# Patient Record
Sex: FEMALE | Race: WHITE | Employment: FULL TIME | URBAN - METROPOLITAN AREA
[De-identification: names, ages, dates, MRNs, and addresses within clinical notes are randomized per-mention and may not be internally consistent; named-entity substitution may affect disease eponyms.]

---

## 2020-10-31 ENCOUNTER — APPOINTMENT (OUTPATIENT)
Dept: CT IMAGING | Age: 40
End: 2020-10-31
Attending: PHYSICIAN ASSISTANT
Payer: COMMERCIAL

## 2020-10-31 ENCOUNTER — HOSPITAL ENCOUNTER (EMERGENCY)
Age: 40
Discharge: HOME | End: 2020-10-31
Attending: EMERGENCY MEDICINE
Payer: COMMERCIAL

## 2020-10-31 VITALS
SYSTOLIC BLOOD PRESSURE: 109 MMHG | BODY MASS INDEX: 19.78 KG/M2 | TEMPERATURE: 98.2 F | HEART RATE: 84 BPM | OXYGEN SATURATION: 99 % | WEIGHT: 126 LBS | RESPIRATION RATE: 18 BRPM | DIASTOLIC BLOOD PRESSURE: 60 MMHG | HEIGHT: 67 IN

## 2020-10-31 DIAGNOSIS — N30.01 ACUTE CYSTITIS WITH HEMATURIA: ICD-10-CM

## 2020-10-31 DIAGNOSIS — F41.1 ANXIETY STATE: ICD-10-CM

## 2020-10-31 DIAGNOSIS — R10.2 PELVIC PAIN IN FEMALE: Primary | ICD-10-CM

## 2020-10-31 LAB
ALBUMIN SERPL-MCNC: 4 G/DL (ref 3.4–5)
ALBUMIN/GLOB SERPL: 1.1 {RATIO} (ref 0.8–1.7)
ALP SERPL-CCNC: 93 U/L (ref 45–117)
ALT SERPL-CCNC: 17 U/L (ref 13–56)
AMORPH CRY URNS QL MICRO: ABNORMAL
ANION GAP SERPL CALC-SCNC: 8 MMOL/L (ref 3–18)
APPEARANCE UR: ABNORMAL
AST SERPL-CCNC: 9 U/L (ref 10–38)
BACTERIA URNS QL MICRO: ABNORMAL /HPF
BASOPHILS # BLD: 0 K/UL (ref 0–0.1)
BASOPHILS NFR BLD: 0 % (ref 0–2)
BILIRUB SERPL-MCNC: 0.5 MG/DL (ref 0.2–1)
BILIRUB UR QL: NEGATIVE
BUN SERPL-MCNC: 14 MG/DL (ref 7–18)
BUN/CREAT SERPL: 20 (ref 12–20)
CALCIUM SERPL-MCNC: 9.3 MG/DL (ref 8.5–10.1)
CAOX CRY URNS QL MICRO: ABNORMAL
CHLORIDE SERPL-SCNC: 107 MMOL/L (ref 100–111)
CO2 SERPL-SCNC: 26 MMOL/L (ref 21–32)
COLOR UR: YELLOW
CREAT SERPL-MCNC: 0.7 MG/DL (ref 0.6–1.3)
DIFFERENTIAL METHOD BLD: ABNORMAL
EOSINOPHIL # BLD: 0.1 K/UL (ref 0–0.4)
EOSINOPHIL NFR BLD: 1 % (ref 0–5)
EPITH CASTS URNS QL MICRO: ABNORMAL /LPF (ref 0–5)
ERYTHROCYTE [DISTWIDTH] IN BLOOD BY AUTOMATED COUNT: 16.1 % (ref 11.6–14.5)
GLOBULIN SER CALC-MCNC: 3.5 G/DL (ref 2–4)
GLUCOSE SERPL-MCNC: 94 MG/DL (ref 74–99)
GLUCOSE UR STRIP.AUTO-MCNC: NEGATIVE MG/DL
HCG UR QL: NEGATIVE
HCT VFR BLD AUTO: 30.3 % (ref 35–45)
HGB BLD-MCNC: 8.7 G/DL (ref 12–16)
HGB UR QL STRIP: ABNORMAL
KETONES UR QL STRIP.AUTO: NEGATIVE MG/DL
LEUKOCYTE ESTERASE UR QL STRIP.AUTO: ABNORMAL
LYMPHOCYTES # BLD: 2 K/UL (ref 0.9–3.6)
LYMPHOCYTES NFR BLD: 15 % (ref 21–52)
MAGNESIUM SERPL-MCNC: 2.2 MG/DL (ref 1.6–2.6)
MCH RBC QN AUTO: 20 PG (ref 24–34)
MCHC RBC AUTO-ENTMCNC: 28.7 G/DL (ref 31–37)
MCV RBC AUTO: 69.8 FL (ref 74–97)
MONOCYTES # BLD: 0.9 K/UL (ref 0.05–1.2)
MONOCYTES NFR BLD: 7 % (ref 3–10)
MUCOUS THREADS URNS QL MICRO: ABNORMAL /LPF
NEUTS SEG # BLD: 10.1 K/UL (ref 1.8–8)
NEUTS SEG NFR BLD: 77 % (ref 40–73)
NITRITE UR QL STRIP.AUTO: NEGATIVE
PH UR STRIP: 8 [PH] (ref 5–8)
PLATELET # BLD AUTO: 319 K/UL (ref 135–420)
PLATELET COMMENTS,PCOM: ABNORMAL
PMV BLD AUTO: 11.5 FL (ref 9.2–11.8)
POTASSIUM SERPL-SCNC: 3.7 MMOL/L (ref 3.5–5.5)
PROT SERPL-MCNC: 7.5 G/DL (ref 6.4–8.2)
PROT UR STRIP-MCNC: NEGATIVE MG/DL
RBC # BLD AUTO: 4.34 M/UL (ref 4.2–5.3)
RBC #/AREA URNS HPF: ABNORMAL /HPF (ref 0–5)
RBC MORPH BLD: ABNORMAL
SODIUM SERPL-SCNC: 141 MMOL/L (ref 136–145)
SP GR UR REFRACTOMETRY: 1.02 (ref 1–1.03)
UROBILINOGEN UR QL STRIP.AUTO: 1 EU/DL (ref 0.2–1)
WBC # BLD AUTO: 13.1 K/UL (ref 4.6–13.2)
WBC URNS QL MICRO: ABNORMAL /HPF (ref 0–5)

## 2020-10-31 PROCEDURE — 81001 URINALYSIS AUTO W/SCOPE: CPT

## 2020-10-31 PROCEDURE — 96375 TX/PRO/DX INJ NEW DRUG ADDON: CPT

## 2020-10-31 PROCEDURE — 83735 ASSAY OF MAGNESIUM: CPT

## 2020-10-31 PROCEDURE — 80053 COMPREHEN METABOLIC PANEL: CPT

## 2020-10-31 PROCEDURE — 74011000636 HC RX REV CODE- 636: Performed by: EMERGENCY MEDICINE

## 2020-10-31 PROCEDURE — 74011250636 HC RX REV CODE- 250/636: Performed by: PHYSICIAN ASSISTANT

## 2020-10-31 PROCEDURE — 85025 COMPLETE CBC W/AUTO DIFF WBC: CPT

## 2020-10-31 PROCEDURE — 81025 URINE PREGNANCY TEST: CPT

## 2020-10-31 PROCEDURE — 74177 CT ABD & PELVIS W/CONTRAST: CPT

## 2020-10-31 PROCEDURE — 96374 THER/PROPH/DIAG INJ IV PUSH: CPT

## 2020-10-31 PROCEDURE — 74011250637 HC RX REV CODE- 250/637: Performed by: PHYSICIAN ASSISTANT

## 2020-10-31 PROCEDURE — 99285 EMERGENCY DEPT VISIT HI MDM: CPT

## 2020-10-31 PROCEDURE — 96361 HYDRATE IV INFUSION ADD-ON: CPT

## 2020-10-31 RX ORDER — CYCLOBENZAPRINE HCL 10 MG
TABLET ORAL
COMMUNITY

## 2020-10-31 RX ORDER — ONDANSETRON 2 MG/ML
4 INJECTION INTRAMUSCULAR; INTRAVENOUS
Status: COMPLETED | OUTPATIENT
Start: 2020-10-31 | End: 2020-10-31

## 2020-10-31 RX ORDER — KETOROLAC TROMETHAMINE 30 MG/ML
15 INJECTION, SOLUTION INTRAMUSCULAR; INTRAVENOUS
Status: COMPLETED | OUTPATIENT
Start: 2020-10-31 | End: 2020-10-31

## 2020-10-31 RX ORDER — ETODOLAC 500 MG/1
500 TABLET, FILM COATED ORAL 2 TIMES DAILY
Status: ON HOLD | COMMUNITY
End: 2021-01-14

## 2020-10-31 RX ORDER — ONDANSETRON 4 MG/1
4 TABLET, ORALLY DISINTEGRATING ORAL
Qty: 12 TAB | Refills: 0 | Status: SHIPPED | OUTPATIENT
Start: 2020-10-31 | End: 2021-01-11

## 2020-10-31 RX ORDER — OXYCODONE AND ACETAMINOPHEN 5; 325 MG/1; MG/1
1 TABLET ORAL
Status: COMPLETED | OUTPATIENT
Start: 2020-10-31 | End: 2020-10-31

## 2020-10-31 RX ORDER — HYDROCODONE BITARTRATE AND ACETAMINOPHEN 5; 325 MG/1; MG/1
1 TABLET ORAL
Qty: 12 TAB | Refills: 0 | Status: SHIPPED | OUTPATIENT
Start: 2020-10-31 | End: 2020-11-05

## 2020-10-31 RX ORDER — NITROFURANTOIN 25; 75 MG/1; MG/1
100 CAPSULE ORAL 2 TIMES DAILY
Qty: 6 CAP | Refills: 0 | Status: SHIPPED | OUTPATIENT
Start: 2020-10-31 | End: 2020-11-03

## 2020-10-31 RX ADMIN — OXYCODONE HYDROCHLORIDE AND ACETAMINOPHEN 1 TABLET: 5; 325 TABLET ORAL at 12:53

## 2020-10-31 RX ADMIN — IOPAMIDOL 100 ML: 612 INJECTION, SOLUTION INTRAVENOUS at 13:03

## 2020-10-31 RX ADMIN — SODIUM CHLORIDE 1000 ML: 900 INJECTION, SOLUTION INTRAVENOUS at 11:57

## 2020-10-31 RX ADMIN — KETOROLAC TROMETHAMINE 15 MG: 30 INJECTION, SOLUTION INTRAMUSCULAR at 11:57

## 2020-10-31 RX ADMIN — ONDANSETRON 4 MG: 2 INJECTION INTRAMUSCULAR; INTRAVENOUS at 12:53

## 2020-10-31 NOTE — ED NOTES
I have reviewed discharge instructions with the patient. The patient verbalized understanding. Patient ARMBAND removed @ bedside and placed in shredder.  @ bedside for d/c ride home.

## 2020-10-31 NOTE — ED PROVIDER NOTES
EMERGENCY DEPARTMENT HISTORY AND PHYSICAL EXAM    Date: 10/31/2020  Patient Name: [facility]    History of Presenting Illness     Chief Complaint   Patient presents with    Allergic Reaction    Abdominal Pain         History Provided By: Patient    Chief Complaint: abdominal pain, allergic reaction, anxiety    HPI(Context):   11:08 AM  [facility] is a 36 y.o. female with PMHX of endometriosis, anxiety, seizures who presents to the emergency department C/O abdominal pain with possible allergic reaction. Pt reports intermittent pelvic pain for several months. Pain is aching and sharp. Radiates to back. Pt awaiting FU with Dr. Brian Gardner MD (ObGYN) for possible endometriosis. Pt reports last night she slept poorly due to pain. Pt states this AM she argued with her SO and soon after she began to feel swelling to right upper lip with reduced sensation to right side of face. Reports numbness in BLE. Pt is currently on her menses. Pt denies fever, chills, nausea, vomiting, tongue swelling, throat swelling, headache, vision changes, incontinence, urinary retention, and any other sxs or complaints. PCP: None    Current Outpatient Medications   Medication Sig Dispense Refill    cyclobenzaprine (FLEXERIL) 10 mg tablet Take  by mouth three (3) times daily as needed for Muscle Spasm(s).  etodolac (LODINE) 500 mg tablet Take 500 mg by mouth two (2) times a day. Indications: pain      HYDROcodone-acetaminophen (NORCO) 5-325 mg per tablet Take 1 Tab by mouth every four (4) hours as needed for Pain for up to 5 days. Max Daily Amount: 6 Tabs. 12 Tab 0    nitrofurantoin, macrocrystal-monohydrate, (Macrobid) 100 mg capsule Take 1 Cap by mouth two (2) times a day for 3 days. Indications: bacterial urinary tract infection 6 Cap 0    ondansetron (Zofran ODT) 4 mg disintegrating tablet Take 1 Tab by mouth every eight (8) hours as needed for Nausea (or vomiting.).  Allow to dissolve on tongue 12 Tab 0 Past History     Past Medical History:  Past Medical History:   Diagnosis Date    Endometriosis     Seizures ([address]        Past Surgical History:  History reviewed. No pertinent surgical history. Family History:  History reviewed. No pertinent family history. Social History:  Social History     Tobacco Use    Smoking status: Never Smoker    Smokeless tobacco: Never Used   Substance Use Topics    Alcohol use: Not Currently    Drug use: Not Currently       Allergies: Allergies   Allergen Reactions    Codeine Hives         Review of Systems   Review of Systems   Constitutional: Negative for chills and fever. HENT: Positive for facial swelling (right lip). Eyes: Negative for visual disturbance. Respiratory: Negative for shortness of breath. Gastrointestinal: Positive for abdominal pain. Negative for nausea and vomiting. Genitourinary: Negative for difficulty urinating. Musculoskeletal: Positive for back pain. Neurological: Positive for numbness (BLE). Negative for dizziness, weakness and headaches. All other systems reviewed and are negative. Physical Exam     Vitals:    10/31/20 1116 10/31/20 1255 10/31/20 1412 10/31/20 1430   BP: (!) 128/58 116/87 110/66 109/60   Pulse:  (!) 103 84    Resp:  18 18    Temp:       SpO2: 95% 100% 100% 99%   Weight:       Height:         Physical Exam  Vitals signs and nursing note reviewed. Constitutional:       General: She is not in acute distress. Appearance: She is well-developed. She is not diaphoretic. Comments: Very anxious  female in NAD. Alert. HENT:      Head: Normocephalic and atraumatic. No right periorbital erythema or left periorbital erythema. Right Ear: External ear normal. No swelling or tenderness. Tympanic membrane is not perforated, erythematous or bulging. Left Ear: External ear normal. No swelling or tenderness. Tympanic membrane is not perforated, erythematous or bulging.       Nose: Nose normal. No congestion or rhinorrhea. Right Sinus: No maxillary sinus tenderness or frontal sinus tenderness. Left Sinus: No maxillary sinus tenderness or frontal sinus tenderness. Mouth/Throat:      Mouth: No oral lesions or angioedema. Dentition: No dental abscesses. Tongue: No lesions. Tongue does not deviate from midline. Pharynx: Uvula midline. No oropharyngeal exudate, posterior oropharyngeal erythema or uvula swelling. Tonsils: No tonsillar abscesses. Eyes:      General:         Right eye: No discharge. Left eye: No discharge. Extraocular Movements: Extraocular movements intact. Conjunctiva/sclera: Conjunctivae normal.      Pupils: Pupils are equal, round, and reactive to light. Neck:      Musculoskeletal: Normal range of motion. Cardiovascular:      Rate and Rhythm: Normal rate and regular rhythm. Heart sounds: Normal heart sounds. No murmur. No friction rub. No gallop. Pulmonary:      Effort: Pulmonary effort is normal. No tachypnea, accessory muscle usage or respiratory distress. Breath sounds: Normal breath sounds. No decreased breath sounds, wheezing, rhonchi or rales. Abdominal:      Tenderness: There is abdominal tenderness (generalized pelvic tenderness). There is no right CVA tenderness, left CVA tenderness, guarding or rebound. Negative signs include McBurney's sign. Musculoskeletal: Normal range of motion. Skin:     General: Skin is warm and dry. Neurological:      Mental Status: She is alert and oriented to person, place, and time. GCS: GCS eye subscore is 4. GCS verbal subscore is 5. GCS motor subscore is 6. Cranial Nerves: Cranial nerves are intact. Motor: No weakness or pronator drift. Coordination: Coordination is intact. Coordination normal.      Comments: Reduced sensation to right side of face     Psychiatric:         Mood and Affect: Mood is anxious.          Judgment: Judgment normal. Diagnostic Study Results     Labs -     Recent Results (from the past 12 hour(s))   URINALYSIS W/ RFLX MICROSCOPIC    Collection Time: 10/31/20 11:20 AM   Result Value Ref Range    Color YELLOW      Appearance CLOUDY      Specific gravity 1.016 1.005 - 1.030      pH (UA) 8.0 5.0 - 8.0      Protein Negative NEG mg/dL    Glucose Negative NEG mg/dL    Ketone Negative NEG mg/dL    Bilirubin Negative NEG      Blood TRACE (A) NEG      Urobilinogen 1.0 0.2 - 1.0 EU/dL    Nitrites Negative NEG      Leukocyte Esterase SMALL (A) NEG     HCG URINE, QL    Collection Time: 10/31/20 11:20 AM   Result Value Ref Range    HCG urine, QL Negative NEG     URINE MICROSCOPIC ONLY    Collection Time: 10/31/20 11:20 AM   Result Value Ref Range    WBC 6 to 8 0 - 5 /hpf    RBC 35 to 45 0 - 5 /hpf    Epithelial cells 3+ 0 - 5 /lpf    Bacteria 2+ (A) NEG /hpf    Mucus 2+ (A) NEG /lpf    Amorphous Crystals 4+ (A) NEG    CA Oxalate crystals FEW (A) NEG     CBC WITH AUTOMATED DIFF    Collection Time: 10/31/20 11:50 AM   Result Value Ref Range    WBC 13.1 4.6 - 13.2 K/uL    RBC 4.34 4.20 - 5.30 M/uL    HGB 8.7 (L) 12.0 - 16.0 g/dL    HCT 30.3 (L) 35.0 - 45.0 %    MCV 69.8 (L) 74.0 - 97.0 FL    MCH 20.0 (L) 24.0 - 34.0 PG    MCHC 28.7 (L) 31.0 - 37.0 g/dL    RDW 16.1 (H) 11.6 - 14.5 %    PLATELET 804 299 - 237 K/uL    MPV 11.5 9.2 - 11.8 FL    NEUTROPHILS 77 (H) 40 - 73 %    LYMPHOCYTES 15 (L) 21 - 52 %    MONOCYTES 7 3 - 10 %    EOSINOPHILS 1 0 - 5 %    BASOPHILS 0 0 - 2 %    ABS. NEUTROPHILS 10.1 (H) 1.8 - 8.0 K/UL    ABS. LYMPHOCYTES 2.0 0.9 - 3.6 K/UL    ABS. MONOCYTES 0.9 0.05 - 1.2 K/UL    ABS. EOSINOPHILS 0.1 0.0 - 0.4 K/UL    ABS.  BASOPHILS 0.0 0.0 - 0.1 K/UL    PLATELET COMMENTS ADEQUATE PLATELETS      RBC COMMENTS ANISOCYTOSIS  1+        RBC COMMENTS HYPOCHROMIA  1+        RBC COMMENTS MICROCYTOSIS  1+        DF AUTOMATED     METABOLIC PANEL, COMPREHENSIVE    Collection Time: 10/31/20 11:50 AM   Result Value Ref Range Sodium 141 136 - 145 mmol/L    Potassium 3.7 3.5 - 5.5 mmol/L    Chloride 107 100 - 111 mmol/L    CO2 26 21 - 32 mmol/L    Anion gap 8 3.0 - 18 mmol/L    Glucose 94 74 - 99 mg/dL    BUN 14 7.0 - 18 MG/DL    Creatinine 0.70 0.6 - 1.3 MG/DL    BUN/Creatinine ratio 20 12 - 20      GFR est AA >60 >60 ml/min/1.73m2    GFR est non-AA >60 >60 ml/min/1.73m2    Calcium 9.3 8.5 - 10.1 MG/DL    Bilirubin, total 0.5 0.2 - 1.0 MG/DL    ALT (SGPT) 17 13 - 56 U/L    AST (SGOT) 9 (L) 10 - 38 U/L    Alk. phosphatase 93 45 - 117 U/L    Protein, total 7.5 6.4 - 8.2 g/dL    Albumin 4.0 3.4 - 5.0 g/dL    Globulin 3.5 2.0 - 4.0 g/dL    A-G Ratio 1.1 0.8 - 1.7     MAGNESIUM    Collection Time: 10/31/20 11:50 AM   Result Value Ref Range    Magnesium 2.2 1.6 - 2.6 mg/dL         CT ABD PELV W CONT   Final Result   Addendum 1 of 1   Addendum: There is a typographical error in the impression. It should read   differential diagnosis includes hydrocolpos. Final        CT Results  (Last 48 hours)    None        CXR Results  (Last 48 hours)    None          Medications given in the ED-  Medications   sodium chloride 0.9 % bolus infusion 1,000 mL (0 mL IntraVENous IV Completed 10/31/20 1405)   ketorolac (TORADOL) injection 15 mg (15 mg IntraVENous Given 10/31/20 1157)   oxyCODONE-acetaminophen (PERCOCET) 5-325 mg per tablet 1 Tab (1 Tab Oral Given 10/31/20 1253)   ondansetron (ZOFRAN) injection 4 mg (4 mg IntraVENous Given 10/31/20 1253)   iopamidoL (ISOVUE 300) 61 % contrast injection 100 mL (100 mL IntraVENous Given 10/31/20 1303)         Medical Decision Making   I am the first provider for this patient. I reviewed the vital signs, available nursing notes, past medical history, past surgical history, family history and social history. Vital Signs-Reviewed the patient's vital signs. Pulse Oximetry Analysis - 99% on RA.  NORMAL     Records Reviewed: Nursing Notes    Provider Notes (Medical Decision Making): pregnancy, UTI, endometriosis, dysmenorrhea, ovarian cyst, appy, allergic reaction, bells. Doubt CVA/TIA. Procedures:  Procedures    ED Course:   11:08 AM Initial assessment performed. The patients presenting problems have been discussed, and they are in agreement with the care plan formulated and outlined with them. I have encouraged them to ask questions as they arise throughout their visit. Diagnosis and Disposition       Pt with multiple complaints including pelvic pain x several weeks with possible allergic reaction this AM. Very anxious and trembling during HPI. CT abdomen/pelvis reveals ovarian cyst with close FU with Dr. Norma Ellis MD (ObGYN) as scheduled for endometriosis workup. Doubt torsion    No evidence of angioedema, phong's, or anaphylactic reaction. Feels better with tx in ED. Doubt CVA or need for advanced intracranial imaging. Reasons to RTED discussed with pt. All questions answered. Pt feels comfortable going home at this time. Pt expressed understanding and she agrees with plan. Discussed case with attending    1. Pelvic pain in female    2. Acute cystitis with hematuria    3. Anxiety state        PLAN:  1. D/C Home  2. Discharge Medication List as of 10/31/2020  2:23 PM      START taking these medications    Details   HYDROcodone-acetaminophen (NORCO) 5-325 mg per tablet Take 1 Tab by mouth every four (4) hours as needed for Pain for up to 5 days. Max Daily Amount: 6 Tabs., Normal, Disp-12 Tab,R-0      nitrofurantoin, macrocrystal-monohydrate, (Macrobid) 100 mg capsule Take 1 Cap by mouth two (2) times a day for 3 days. Indications: bacterial urinary tract infection, Normal, Disp-6 Cap,R-0      ondansetron (Zofran ODT) 4 mg disintegrating tablet Take 1 Tab by mouth every eight (8) hours as needed for Nausea (or vomiting.).  Allow to dissolve on tongue, Normal, Disp-12 Tab,R-0         CONTINUE these medications which have NOT CHANGED    Details   cyclobenzaprine (FLEXERIL) 10 mg tablet Take  by mouth three (3) times daily as needed for Muscle Spasm(s). , Historical Med      etodolac (LODINE) 500 mg tablet Take 500 mg by mouth two (2) times a day. Indications: pain, Historical Med           3. Follow-up Information     Follow up With Specialties Details Why Contact Info    Aditya Burroughs MD Obstetrics & Gynecology, Gynecology, Obstetrics   [address]      [facility] EMERGENCY DEPT Emergency Medicine  As needed, If symptoms worsen [address] [place] [address]  [phone]        _______________________________    Attestations: This note is prepared by Shira Maria PA-C.  _______________________________          Please note that this dictation was completed with Transpond, the computer voice recognition software. Quite often unanticipated grammatical, syntax, homophones, and other interpretive errors are inadvertently transcribed by the computer software. Please disregard these errors. Please excuse any errors that have escaped final proofreading.

## 2020-10-31 NOTE — LETTER
[facility] [place]  [facility] EM[facility]  [address] [facility] [address] [phone]    Work/School Note    Date: 10/31/2020    To Whom It May concern:    Hollie Eason was seen and treated today in the emergency room by the following provider(s):  Attending Provider: Caro House DO  Physician Assistant: Keshav Ellis PA-C. Hollie Eason may return to work on 11/2/20.     Sincerely,          Wojciech Gomez RN

## 2020-10-31 NOTE — ED TRIAGE NOTES
Pt states \" I am having and allergic reaction to something and I am having some right sided lip swelling but I don't know I think I am having some anxiety, I have a lot going on at work and my  and I had some arguing this am.\"

## 2020-11-12 NOTE — PROGRESS NOTES
[address]  [address] [place] [address], [address], [address] [facility]  [address] [facility], [address] [facility]  Eben Cedillo    [phone]  Marsa Coil DO      Patient ID:  Name:  Rc Miranda  MRN:  [id number]  :  1980/40 y.o. Date:  2020      HISTORY OF PRESENT ILLNESS:  Rc Miranda is a 36 y.o.   premenopausal female referred by Dr. Micheal Baires for uterine mass. Patient had MRI for lower back pain 10/17/20 which showed endometrial canal expanded and heterogenous signal intensity\". She was seen in ED 10/31/20 with multiple complaints, including abdominal and back pain. CT abd/pelvis revealed cervical mass and left adnexal mass with septations, report below. Hgb 8.7. She was seen by gyn 11/10/20, TVUS showed irregular mass in endometrium, irregular shaped cervix, report below. She presents today for further management. Has normal menses. No history of abnormal paps or procedures on cervix. Still having some back pain      Pathology:  none    Labs:  10/20/20  Pap: insufficient      Imaging:  10/31/20  CT abd/pelvis  FINDINGS:     LOWER CHEST: Moderate size hiatal hernia.     LIVER, BILIARY: Liver is normal. No biliary dilation. Gallbladder is  unremarkable.     PANCREAS: Normal.     SPLEEN: Normal.     ADRENALS: Normal.     KIDNEYS: Normal.     LYMPH NODES: No enlarged lymph nodes.     GASTROINTESTINAL TRACT: No bowel dilation or wall thickening. Normal appendix.     PELVIC ORGANS: Endometrium is distended with heterogeneous attenuation. Some is  low attenuation however there are areas of high attenuation within this. This  extends down to the cervical region which is prominent and also heterogeneous. There is a complex cystic mass in the left adnexal region which is probably  ovarian. There appear to be several cysts within septations measuring 4.5 x 3.5  cm.  Right ovary is normal. There is some stranding of the fat posterior to the  cervix. Small amount of fluid adjacent to the right ovary.     VASCULATURE: Unremarkable.     BONES: No acute or aggressive osseous abnormalities identified.     OTHER: None.     _______________     IMPRESSION  IMPRESSION:     Uterus is abnormal with dilated endometrial canal measuring up to 7.8 cm  transversely. Heterogeneous material within this. Cervix is also prominent. Differential diagnosis includes hydrocolpos process. This could be secondary to  cervical mass. 2. Enlarged left ovary with multiple cysts. 11/10/20  TVUS  Uterus: 10.21cm  Endometrium: 4.1x4.35cm, ?fibroid vs polyp, hyperechoic irregular shaped mass  Cervix: irregular shape, multiple nabothian cysts noted  Right Ovary: 3.17x2.36cm  Left Ovary: 3.79x2.73cm, cyst 2.35x3. 11cm       ROS:   As above      There are no active problems to display for this patient. Past Medical History:   Diagnosis Date    Endometriosis     Seizures ([facility] [address]       History reviewed. No pertinent surgical history. OB History        1    Para   1    Term                AB        Living   1       SAB        TAB        Ectopic        Molar        Multiple        Live Births                  Social History     Tobacco Use    Smoking status: Never Smoker    Smokeless tobacco: Never Used   Substance Use Topics    Alcohol use: Not Currently      Family History   Problem Relation Age of Onset    Cancer Mother         ovarian      Current Outpatient Medications   Medication Sig    etodolac (LODINE) 500 mg tablet Take 500 mg by mouth two (2) times a day. Indications: pain    cyclobenzaprine (FLEXERIL) 10 mg tablet Take  by mouth three (3) times daily as needed for Muscle Spasm(s).  ondansetron (Zofran ODT) 4 mg disintegrating tablet Take 1 Tab by mouth every eight (8) hours as needed for Nausea (or vomiting.). Allow to dissolve on tongue     No current facility-administered medications for this visit.       Allergies Allergen Reactions    Codeine Hives          OBJECTIVE:    Physical Exam  VITAL SIGNS: Visit Vitals  BP (!) 122/59 (BP 1 Location: Left arm, BP Patient Position: Sitting)   Pulse 91   Temp 97 °F (36.1 °C) (Oral)   Ht 5' 7\" (1.702 m)   Wt 57.5 kg (126 lb 12.8 oz)   LMP 11/01/2020   SpO2 100%   BMI 19.86 kg/m²      GENERAL CLARK: in no apparent distress and well developed and well nourished   MUSCULOSKEL: no joint tenderness, deformity or swelling   INTEGUMENT:  warm and dry, no rashes or lesions   ABDOMEN . soft, NT, ND, No masses appreciated   EXTREMITIES: extremities normal, atraumatic, no cyanosis or edema   PELVIC: External genitalia: normal general appearance  Vaginal: normal mucosa without prolapse or lesions  Cervix: bulky cervix with some tenderness. Adnexa: normal bimanual exam  Uterus: irregular enlargement   RECTAL: deferred   RENAE SURVEY: Cervical, supraclavicular, axillary and inguinal nodes normal.   NEURO: Grossly normal         IMPRESSION/PLAN:  1. Thickened endometrium, enlarged cervix   -reviewed her imaging   -reviewed possible etiology including fibroid, polyp   -plan for hysteroscopy, D&C for tissue diagnosis   -surgery to be scheduled at [facility]    2.  Left ovarian cyst   -would recommend monitor for now pending pathology from [facility]     The total time spent was 60 minutes regarding this patients diagnosis of thickened endometrium, left ovarian cyst and >50% of this time was spent counseling and coordinating care    [address] [place] [address]  Gynecologic Oncology  11/17/20203:52 PM

## 2020-11-12 NOTE — H&P (VIEW-ONLY)
[address], [address] [facility], [address] [facility] 
[address] [place] [address], [address] [place], [address] 
 [phone] Jose R Scherer DO Patient ID: 
Name:  [facility] MRN:  [id number] :  1980/40 y.o. Date:  2020 HISTORY OF PRESENT ILLNESS: 
[facility] is a 36 y.o.   premenopausal female referred by Dr. Clair Whalen for uterine mass. Patient had MRI for lower back pain 10/17/20 which showed endometrial canal expanded and heterogenous signal intensity\". She was seen in ED 10/31/20 with multiple complaints, including abdominal and back pain. CT abd/pelvis revealed cervical mass and left adnexal mass with septations, report below. Hgb 8.7. She was seen by gyn 11/10/20, TVUS showed irregular mass in endometrium, irregular shaped cervix, report below. She presents today for further management. Has normal menses. No history of abnormal paps or procedures on cervix. Still having some back pain Pathology: 
none Labs: 
10/20/20 Pap: insufficient Imaging: 
10/31/20 CT abd/pelvis FINDINGS: 
  
LOWER CHEST: Moderate size hiatal hernia. 
  
LIVER, BILIARY: Liver is normal. No biliary dilation. Gallbladder is 
unremarkable. 
  
PANCREAS: Normal. 
  
SPLEEN: Normal. 
  
ADRENALS: Normal. 
  
KIDNEYS: Normal. 
  
LYMPH NODES: No enlarged lymph nodes. 
  
GASTROINTESTINAL TRACT: No bowel dilation or wall thickening. Normal appendix. 
  
PELVIC ORGANS: Endometrium is distended with heterogeneous attenuation. Some is 
low attenuation however there are areas of high attenuation within this. This 
extends down to the cervical region which is prominent and also heterogeneous. There is a complex cystic mass in the left adnexal region which is probably 
ovarian. There appear to be several cysts within septations measuring 4.5 x 3.5 cm. Right ovary is normal. There is some stranding of the fat posterior to the 
cervix. Small amount of fluid adjacent to the right ovary. 
  
VASCULATURE: Unremarkable. 
  
BONES: No acute or aggressive osseous abnormalities identified. 
  
OTHER: None. 
  
_______________ 
  
IMPRESSION IMPRESSION: 
  
Uterus is abnormal with dilated endometrial canal measuring up to 7.8 cm 
transversely. Heterogeneous material within this. Cervix is also prominent. Differential diagnosis includes hydrocolpos process. This could be secondary to 
cervical mass. 2. Enlarged left ovary with multiple cysts. 11/10/20 TVUS Uterus: 10.21cm Endometrium: 4.1x4.35cm, ?fibroid vs polyp, hyperechoic irregular shaped mass Cervix: irregular shape, multiple nabothian cysts noted Right Ovary: 3.17x2.36cm Left Ovary: 3.79x2.73cm, cyst 2.35x3. 11cm ROS:  
As above There are no active problems to display for this patient. Past Medical History:  
Diagnosis Date  Endometriosis  Seizures ([facility] [address] History reviewed. No pertinent surgical history. OB History [address] 1 Para 1 Term  AB Living  
1 SAB  
   
 TAB Ectopic Molar Multiple Live Births Social History Tobacco Use  Smoking status: Never Smoker  Smokeless tobacco: Never Used Substance Use Topics  Alcohol use: Not Currently Family History Problem Relation Age of Onset  Cancer Mother   
     ovarian Current Outpatient Medications Medication Sig  etodolac (LODINE) 500 mg tablet Take 500 mg by mouth two (2) times a day. Indications: pain  cyclobenzaprine (FLEXERIL) 10 mg tablet Take  by mouth three (3) times daily as needed for Muscle Spasm(s).  ondansetron (Zofran ODT) 4 mg disintegrating tablet Take 1 Tab by mouth every eight (8) hours as needed for Nausea (or vomiting.). Allow to dissolve on tongue No current facility-administered medications for this visit. Allergies Allergen Reactions  Codeine Hives OBJECTIVE: 
 
Physical Exam 
VITAL SIGNS: Visit Vitals BP (!) 122/59 (BP 1 Location: Left arm, BP Patient Position: Sitting) Pulse 91 Temp 97 °F (36.1 °C) (Oral) Ht 5' 7\" (1.702 m) Wt 57.5 kg (126 lb 12.8 oz) LMP 11/01/2020 SpO2 100% BMI 19.86 kg/m² GENERAL CLARK: in no apparent distress and well developed and well nourished MUSCULOSKEL: no joint tenderness, deformity or swelling INTEGUMENT:  warm and dry, no rashes or lesions ABDOMEN . soft, NT, ND, No masses appreciated EXTREMITIES: extremities normal, atraumatic, no cyanosis or edema PELVIC: External genitalia: normal general appearance Vaginal: normal mucosa without prolapse or lesions Cervix: bulky cervix with some tenderness. Adnexa: normal bimanual exam 
Uterus: irregular enlargement RECTAL: deferred RENAE SURVEY: Cervical, supraclavicular, axillary and inguinal nodes normal.  
NEURO: Grossly normal  
 
 
 
IMPRESSION/PLAN: 
1. Thickened endometrium, enlarged cervix 
 -reviewed her imaging 
 -reviewed possible etiology including fibroid, polyp 
 -plan for hysteroscopy, D&C for tissue diagnosis 
 -surgery to be scheduled at [facility] 2. Left ovarian cyst 
 -would recommend monitor for now pending pathology from [facility]  The total time spent was 60 minutes regarding this patients diagnosis of thickened endometrium, left ovarian cyst and >50% of this time was spent counseling and coordinating care Radha Hernandez DO Gynecologic Oncology 11/17/20203:52 PM

## 2020-11-17 ENCOUNTER — OFFICE VISIT (OUTPATIENT)
Dept: ONCOLOGY | Age: 40
End: 2020-11-17
Payer: COMMERCIAL

## 2020-11-17 ENCOUNTER — HOSPITAL ENCOUNTER (OUTPATIENT)
Dept: PREADMISSION TESTING | Age: 40
Discharge: HOME | End: 2020-11-17
Payer: COMMERCIAL

## 2020-11-17 VITALS
BODY MASS INDEX: 19.9 KG/M2 | HEART RATE: 91 BPM | SYSTOLIC BLOOD PRESSURE: 122 MMHG | OXYGEN SATURATION: 100 % | TEMPERATURE: 97 F | WEIGHT: 126.8 LBS | DIASTOLIC BLOOD PRESSURE: 59 MMHG | HEIGHT: 67 IN

## 2020-11-17 DIAGNOSIS — R93.89 THICKENED ENDOMETRIUM: ICD-10-CM

## 2020-11-17 DIAGNOSIS — Z01.818 PREOP TESTING: ICD-10-CM

## 2020-11-17 DIAGNOSIS — Z01.818 PREOP TESTING: Primary | ICD-10-CM

## 2020-11-17 LAB
ALBUMIN SERPL-MCNC: 4 G/DL (ref 3.4–5)
ALBUMIN/GLOB SERPL: 1.2 {RATIO} (ref 0.8–1.7)
ALP SERPL-CCNC: 97 U/L (ref 45–117)
ALT SERPL-CCNC: 15 U/L (ref 13–56)
ANION GAP SERPL CALC-SCNC: 6 MMOL/L (ref 3–18)
AST SERPL-CCNC: 12 U/L (ref 10–38)
BASOPHILS # BLD: 0 K/UL (ref 0–0.1)
BASOPHILS NFR BLD: 0 % (ref 0–2)
BILIRUB SERPL-MCNC: 0.5 MG/DL (ref 0.2–1)
BUN SERPL-MCNC: 14 MG/DL (ref 7–18)
BUN/CREAT SERPL: 22 (ref 12–20)
CALCIUM SERPL-MCNC: 9.4 MG/DL (ref 8.5–10.1)
CHLORIDE SERPL-SCNC: 105 MMOL/L (ref 100–111)
CO2 SERPL-SCNC: 30 MMOL/L (ref 21–32)
CREAT SERPL-MCNC: 0.63 MG/DL (ref 0.6–1.3)
DIFFERENTIAL METHOD BLD: ABNORMAL
EOSINOPHIL # BLD: 0.1 K/UL (ref 0–0.4)
EOSINOPHIL NFR BLD: 1 % (ref 0–5)
ERYTHROCYTE [DISTWIDTH] IN BLOOD BY AUTOMATED COUNT: 16.3 % (ref 11.6–14.5)
GLOBULIN SER CALC-MCNC: 3.4 G/DL (ref 2–4)
GLUCOSE SERPL-MCNC: 88 MG/DL (ref 74–99)
HCG SERPL-ACNC: <1 MIU/ML (ref 0–10)
HCT VFR BLD AUTO: 31.4 % (ref 35–45)
HGB BLD-MCNC: 8.9 G/DL (ref 12–16)
LYMPHOCYTES # BLD: 1.7 K/UL (ref 0.9–3.6)
LYMPHOCYTES NFR BLD: 17 % (ref 21–52)
MCH RBC QN AUTO: 20 PG (ref 24–34)
MCHC RBC AUTO-ENTMCNC: 28.3 G/DL (ref 31–37)
MCV RBC AUTO: 70.7 FL (ref 74–97)
MONOCYTES # BLD: 0.8 K/UL (ref 0.05–1.2)
MONOCYTES NFR BLD: 8 % (ref 3–10)
NEUTS SEG # BLD: 7.2 K/UL (ref 1.8–8)
NEUTS SEG NFR BLD: 74 % (ref 40–73)
PLATELET # BLD AUTO: 311 K/UL (ref 135–420)
PLATELET COMMENTS,PCOM: ABNORMAL
PMV BLD AUTO: 12 FL (ref 9.2–11.8)
POTASSIUM SERPL-SCNC: 3.8 MMOL/L (ref 3.5–5.5)
PROT SERPL-MCNC: 7.4 G/DL (ref 6.4–8.2)
RBC # BLD AUTO: 4.44 M/UL (ref 4.2–5.3)
RBC MORPH BLD: ABNORMAL
SODIUM SERPL-SCNC: 141 MMOL/L (ref 136–145)
WBC # BLD AUTO: 9.8 K/UL (ref 4.6–13.2)

## 2020-11-17 PROCEDURE — 84702 CHORIONIC GONADOTROPIN TEST: CPT

## 2020-11-17 PROCEDURE — 85025 COMPLETE CBC W/AUTO DIFF WBC: CPT

## 2020-11-17 PROCEDURE — 80053 COMPREHEN METABOLIC PANEL: CPT

## 2020-11-17 PROCEDURE — 99205 OFFICE O/P NEW HI 60 MIN: CPT | Performed by: OBSTETRICS & GYNECOLOGY

## 2020-11-17 PROCEDURE — 93005 ELECTROCARDIOGRAM TRACING: CPT

## 2020-11-17 PROCEDURE — 36415 COLL VENOUS BLD VENIPUNCTURE: CPT

## 2020-11-17 NOTE — PROGRESS NOTES
Rc Miranda is a 36 y.o. female presents in office for new pt exam, referred by Dr. Anat Roberto     Chief Complaint   Patient presents with    New Patient      Pt is currently not having any bleeding. Do you have any unusual vaginal bleeding, discharge or irritation? No  Do you have any changes in your bowel movements? No  Have you been experiencing nausea or vomiting? No  Have you been experiencing any continuous or worsening abdominal pain? Pt c/o pain occasionally  Any urinary burning? No    Visit Vitals  BP (!) 122/59 (BP 1 Location: Left arm, BP Patient Position: Sitting)   Pulse 91   Temp 97 °F (36.1 °C) (Oral)   Ht 5' 7\" (1.702 m)   Wt 126 lb 12.8 oz (57.5 kg)   SpO2 100%   BMI 19.86 kg/m²         1. Have you been to the ER, urgent care clinic since your last visit? Hospitalized since your last visit? Yes 11/03/2020 allergic reaction    2. Have you seen or consulted any other health care providers outside of the [address] [place] [address] since your last visit? Include any pap smears or colon screening.  No

## 2020-11-18 LAB
ATRIAL RATE: 69 BPM
CALCULATED P AXIS, ECG09: 69 DEGREES
CALCULATED R AXIS, ECG10: 76 DEGREES
CALCULATED T AXIS, ECG11: 61 DEGREES
DIAGNOSIS, 93000: NORMAL
P-R INTERVAL, ECG05: 110 MS
Q-T INTERVAL, ECG07: 388 MS
QRS DURATION, ECG06: 86 MS
QTC CALCULATION (BEZET), ECG08: 415 MS
VENTRICULAR RATE, ECG03: 69 BPM

## 2020-11-23 ENCOUNTER — TELEPHONE (OUTPATIENT)
Dept: ONCOLOGY | Age: 40
End: 2020-11-23

## 2020-11-23 NOTE — TELEPHONE ENCOUNTER
Left a message advising patient to contact the office regarding surgery date. Office number was left on voicemail.

## 2020-12-11 ENCOUNTER — HOSPITAL ENCOUNTER (OUTPATIENT)
Dept: PREADMISSION TESTING | Age: 40
Discharge: HOME | End: 2020-12-11
Payer: COMMERCIAL

## 2020-12-11 PROCEDURE — 87635 SARS-COV-2 COVID-19 AMP PRB: CPT

## 2020-12-13 LAB — SARS-COV-2, COV2NT: NOT DETECTED

## 2020-12-14 ENCOUNTER — ANESTHESIA EVENT (OUTPATIENT)
Dept: SURGERY | Age: 40
End: 2020-12-14
Payer: COMMERCIAL

## 2020-12-16 ENCOUNTER — TELEPHONE (OUTPATIENT)
Dept: ONCOLOGY | Age: 40
End: 2020-12-16

## 2020-12-16 NOTE — TELEPHONE ENCOUNTER
Patient confirmed arrival and surgery start time for 12/17/2020. Patient is to arrive at 11:30 AM with a 1:30 PM surgery start time.

## 2020-12-17 ENCOUNTER — HOSPITAL ENCOUNTER (OUTPATIENT)
Age: 40
Setting detail: OUTPATIENT SURGERY
Discharge: HOME | End: 2020-12-17
Attending: OBSTETRICS & GYNECOLOGY | Admitting: OBSTETRICS & GYNECOLOGY
Payer: COMMERCIAL

## 2020-12-17 ENCOUNTER — ANESTHESIA (OUTPATIENT)
Dept: SURGERY | Age: 40
End: 2020-12-17
Payer: COMMERCIAL

## 2020-12-17 VITALS
TEMPERATURE: 97.6 F | RESPIRATION RATE: 18 BRPM | SYSTOLIC BLOOD PRESSURE: 117 MMHG | BODY MASS INDEX: 19.16 KG/M2 | OXYGEN SATURATION: 100 % | HEIGHT: 67 IN | HEART RATE: 93 BPM | DIASTOLIC BLOOD PRESSURE: 66 MMHG | WEIGHT: 122.06 LBS

## 2020-12-17 DIAGNOSIS — R93.89 THICKENED ENDOMETRIUM: ICD-10-CM

## 2020-12-17 DIAGNOSIS — N88.8 CERVICAL MASS: ICD-10-CM

## 2020-12-17 DIAGNOSIS — G89.18 POST-OP PAIN: Primary | ICD-10-CM

## 2020-12-17 LAB — HCG UR QL: NEGATIVE

## 2020-12-17 PROCEDURE — 76210000026 HC REC RM PH II 1 TO 1.5 HR: Performed by: OBSTETRICS & GYNECOLOGY

## 2020-12-17 PROCEDURE — 57500 BIOPSY OF CERVIX: CPT | Performed by: OBSTETRICS & GYNECOLOGY

## 2020-12-17 PROCEDURE — 74011250636 HC RX REV CODE- 250/636: Performed by: NURSE ANESTHETIST, CERTIFIED REGISTERED

## 2020-12-17 PROCEDURE — 81025 URINE PREGNANCY TEST: CPT

## 2020-12-17 PROCEDURE — 77030018390 HC SPNG HEMSTAT2 J&J -B: Performed by: OBSTETRICS & GYNECOLOGY

## 2020-12-17 PROCEDURE — 76010000138 HC OR TIME 0.5 TO 1 HR: Performed by: OBSTETRICS & GYNECOLOGY

## 2020-12-17 PROCEDURE — 2709999900 HC NON-CHARGEABLE SUPPLY: Performed by: OBSTETRICS & GYNECOLOGY

## 2020-12-17 PROCEDURE — 88342 IMHCHEM/IMCYTCHM 1ST ANTB: CPT

## 2020-12-17 PROCEDURE — 88305 TISSUE EXAM BY PATHOLOGIST: CPT

## 2020-12-17 PROCEDURE — 77030020782 HC GWN BAIR PAWS FLX 3M -B: Performed by: OBSTETRICS & GYNECOLOGY

## 2020-12-17 PROCEDURE — 77030011264 HC ELECTRD BLD EXT COVD -A: Performed by: OBSTETRICS & GYNECOLOGY

## 2020-12-17 PROCEDURE — 74011000250 HC RX REV CODE- 250: Performed by: NURSE ANESTHETIST, CERTIFIED REGISTERED

## 2020-12-17 PROCEDURE — 74011250636 HC RX REV CODE- 250/636: Performed by: OBSTETRICS & GYNECOLOGY

## 2020-12-17 PROCEDURE — 76210000006 HC OR PH I REC 0.5 TO 1 HR: Performed by: OBSTETRICS & GYNECOLOGY

## 2020-12-17 PROCEDURE — 74011250636 HC RX REV CODE- 250/636: Performed by: ANESTHESIOLOGY

## 2020-12-17 PROCEDURE — 77030019927 HC TBNG IRR CYSTO BAXT -A: Performed by: OBSTETRICS & GYNECOLOGY

## 2020-12-17 PROCEDURE — 88341 IMHCHEM/IMCYTCHM EA ADD ANTB: CPT

## 2020-12-17 PROCEDURE — 58558 HYSTEROSCOPY BIOPSY: CPT | Performed by: OBSTETRICS & GYNECOLOGY

## 2020-12-17 PROCEDURE — 76060000032 HC ANESTHESIA 0.5 TO 1 HR: Performed by: OBSTETRICS & GYNECOLOGY

## 2020-12-17 PROCEDURE — 77030040361 HC SLV COMPR DVT MDII -B: Performed by: OBSTETRICS & GYNECOLOGY

## 2020-12-17 RX ORDER — OXYCODONE AND ACETAMINOPHEN 5; 325 MG/1; MG/1
1 TABLET ORAL
Qty: 40 TAB | Refills: 0 | Status: SHIPPED | OUTPATIENT
Start: 2020-12-17 | End: 2020-12-31

## 2020-12-17 RX ORDER — NALOXONE HYDROCHLORIDE 0.4 MG/ML
0.1 INJECTION, SOLUTION INTRAMUSCULAR; INTRAVENOUS; SUBCUTANEOUS AS NEEDED
Status: DISCONTINUED | OUTPATIENT
Start: 2020-12-17 | End: 2020-12-17 | Stop reason: HOSPADM

## 2020-12-17 RX ORDER — MAGNESIUM SULFATE 100 %
4 CRYSTALS MISCELLANEOUS AS NEEDED
Status: DISCONTINUED | OUTPATIENT
Start: 2020-12-17 | End: 2020-12-17 | Stop reason: HOSPADM

## 2020-12-17 RX ORDER — METOCLOPRAMIDE HYDROCHLORIDE 5 MG/ML
INJECTION INTRAMUSCULAR; INTRAVENOUS AS NEEDED
Status: DISCONTINUED | OUTPATIENT
Start: 2020-12-17 | End: 2020-12-17 | Stop reason: HOSPADM

## 2020-12-17 RX ORDER — MIDAZOLAM HYDROCHLORIDE 1 MG/ML
INJECTION, SOLUTION INTRAMUSCULAR; INTRAVENOUS AS NEEDED
Status: DISCONTINUED | OUTPATIENT
Start: 2020-12-17 | End: 2020-12-17 | Stop reason: HOSPADM

## 2020-12-17 RX ORDER — LIDOCAINE HYDROCHLORIDE 20 MG/ML
INJECTION, SOLUTION EPIDURAL; INFILTRATION; INTRACAUDAL; PERINEURAL AS NEEDED
Status: DISCONTINUED | OUTPATIENT
Start: 2020-12-17 | End: 2020-12-17 | Stop reason: HOSPADM

## 2020-12-17 RX ORDER — DIPHENHYDRAMINE HYDROCHLORIDE 50 MG/ML
12.5 INJECTION, SOLUTION INTRAMUSCULAR; INTRAVENOUS
Status: DISCONTINUED | OUTPATIENT
Start: 2020-12-17 | End: 2020-12-17 | Stop reason: HOSPADM

## 2020-12-17 RX ORDER — ALBUTEROL SULFATE 0.83 MG/ML
2.5 SOLUTION RESPIRATORY (INHALATION) AS NEEDED
Status: DISCONTINUED | OUTPATIENT
Start: 2020-12-17 | End: 2020-12-17 | Stop reason: HOSPADM

## 2020-12-17 RX ORDER — FENTANYL CITRATE 50 UG/ML
25 INJECTION, SOLUTION INTRAMUSCULAR; INTRAVENOUS
Status: DISCONTINUED | OUTPATIENT
Start: 2020-12-17 | End: 2020-12-17 | Stop reason: HOSPADM

## 2020-12-17 RX ORDER — INSULIN LISPRO 100 [IU]/ML
INJECTION, SOLUTION INTRAVENOUS; SUBCUTANEOUS ONCE
Status: DISCONTINUED | OUTPATIENT
Start: 2020-12-17 | End: 2020-12-17 | Stop reason: HOSPADM

## 2020-12-17 RX ORDER — SODIUM CHLORIDE, SODIUM LACTATE, POTASSIUM CHLORIDE, CALCIUM CHLORIDE 600; 310; 30; 20 MG/100ML; MG/100ML; MG/100ML; MG/100ML
150 INJECTION, SOLUTION INTRAVENOUS CONTINUOUS
Status: DISCONTINUED | OUTPATIENT
Start: 2020-12-17 | End: 2020-12-17 | Stop reason: HOSPADM

## 2020-12-17 RX ORDER — DEXAMETHASONE SODIUM PHOSPHATE 4 MG/ML
INJECTION, SOLUTION INTRA-ARTICULAR; INTRALESIONAL; INTRAMUSCULAR; INTRAVENOUS; SOFT TISSUE AS NEEDED
Status: DISCONTINUED | OUTPATIENT
Start: 2020-12-17 | End: 2020-12-17 | Stop reason: HOSPADM

## 2020-12-17 RX ORDER — ONDANSETRON 2 MG/ML
4 INJECTION INTRAMUSCULAR; INTRAVENOUS ONCE
Status: COMPLETED | OUTPATIENT
Start: 2020-12-17 | End: 2020-12-17

## 2020-12-17 RX ORDER — SODIUM CHLORIDE, SODIUM LACTATE, POTASSIUM CHLORIDE, CALCIUM CHLORIDE 600; 310; 30; 20 MG/100ML; MG/100ML; MG/100ML; MG/100ML
125 INJECTION, SOLUTION INTRAVENOUS CONTINUOUS
Status: DISCONTINUED | OUTPATIENT
Start: 2020-12-17 | End: 2020-12-17 | Stop reason: HOSPADM

## 2020-12-17 RX ORDER — FENTANYL CITRATE 50 UG/ML
INJECTION, SOLUTION INTRAMUSCULAR; INTRAVENOUS AS NEEDED
Status: DISCONTINUED | OUTPATIENT
Start: 2020-12-17 | End: 2020-12-17 | Stop reason: HOSPADM

## 2020-12-17 RX ORDER — SODIUM CHLORIDE 0.9 % (FLUSH) 0.9 %
5-40 SYRINGE (ML) INJECTION EVERY 8 HOURS
Status: DISCONTINUED | OUTPATIENT
Start: 2020-12-17 | End: 2020-12-17 | Stop reason: HOSPADM

## 2020-12-17 RX ORDER — DEXTROSE MONOHYDRATE 100 MG/ML
125-250 INJECTION, SOLUTION INTRAVENOUS AS NEEDED
Status: DISCONTINUED | OUTPATIENT
Start: 2020-12-17 | End: 2020-12-17 | Stop reason: HOSPADM

## 2020-12-17 RX ORDER — MIDAZOLAM HYDROCHLORIDE 1 MG/ML
2 INJECTION, SOLUTION INTRAMUSCULAR; INTRAVENOUS ONCE
Status: COMPLETED | OUTPATIENT
Start: 2020-12-17 | End: 2020-12-17

## 2020-12-17 RX ORDER — PROPOFOL 10 MG/ML
INJECTION, EMULSION INTRAVENOUS AS NEEDED
Status: DISCONTINUED | OUTPATIENT
Start: 2020-12-17 | End: 2020-12-17 | Stop reason: HOSPADM

## 2020-12-17 RX ORDER — MIDAZOLAM HYDROCHLORIDE 1 MG/ML
INJECTION, SOLUTION INTRAMUSCULAR; INTRAVENOUS
Status: DISCONTINUED
Start: 2020-12-17 | End: 2020-12-17 | Stop reason: HOSPADM

## 2020-12-17 RX ORDER — ONDANSETRON 2 MG/ML
INJECTION INTRAMUSCULAR; INTRAVENOUS AS NEEDED
Status: DISCONTINUED | OUTPATIENT
Start: 2020-12-17 | End: 2020-12-17 | Stop reason: HOSPADM

## 2020-12-17 RX ORDER — SODIUM CHLORIDE 0.9 % (FLUSH) 0.9 %
5-40 SYRINGE (ML) INJECTION AS NEEDED
Status: DISCONTINUED | OUTPATIENT
Start: 2020-12-17 | End: 2020-12-17 | Stop reason: HOSPADM

## 2020-12-17 RX ADMIN — MIDAZOLAM 2 MG: 1 INJECTION INTRAMUSCULAR; INTRAVENOUS at 13:35

## 2020-12-17 RX ADMIN — FENTANYL CITRATE 25 MCG: 50 INJECTION, SOLUTION INTRAMUSCULAR; INTRAVENOUS at 14:40

## 2020-12-17 RX ADMIN — FENTANYL CITRATE 25 MCG: 50 INJECTION, SOLUTION INTRAMUSCULAR; INTRAVENOUS at 15:02

## 2020-12-17 RX ADMIN — SODIUM CHLORIDE, SODIUM LACTATE, POTASSIUM CHLORIDE, AND CALCIUM CHLORIDE 125 ML/HR: 600; 310; 30; 20 INJECTION, SOLUTION INTRAVENOUS at 11:57

## 2020-12-17 RX ADMIN — METOCLOPRAMIDE 10 MG: 5 INJECTION, SOLUTION INTRAMUSCULAR; INTRAVENOUS at 13:46

## 2020-12-17 RX ADMIN — LIDOCAINE HYDROCHLORIDE 40 MG: 20 INJECTION, SOLUTION EPIDURAL; INFILTRATION; INTRACAUDAL; PERINEURAL at 13:40

## 2020-12-17 RX ADMIN — FENTANYL CITRATE 50 MCG: 50 INJECTION, SOLUTION INTRAMUSCULAR; INTRAVENOUS at 13:46

## 2020-12-17 RX ADMIN — DEXAMETHASONE SODIUM PHOSPHATE 4 MG: 4 INJECTION, SOLUTION INTRAMUSCULAR; INTRAVENOUS at 13:46

## 2020-12-17 RX ADMIN — SODIUM CHLORIDE, SODIUM LACTATE, POTASSIUM CHLORIDE, AND CALCIUM CHLORIDE: 600; 310; 30; 20 INJECTION, SOLUTION INTRAVENOUS at 14:06

## 2020-12-17 RX ADMIN — SODIUM CHLORIDE, SODIUM LACTATE, POTASSIUM CHLORIDE, AND CALCIUM CHLORIDE 150 ML/HR: 600; 310; 30; 20 INJECTION, SOLUTION INTRAVENOUS at 15:06

## 2020-12-17 RX ADMIN — FENTANYL CITRATE 25 MCG: 50 INJECTION, SOLUTION INTRAMUSCULAR; INTRAVENOUS at 14:30

## 2020-12-17 RX ADMIN — MIDAZOLAM 2 MG: 1 INJECTION INTRAMUSCULAR; INTRAVENOUS at 14:20

## 2020-12-17 RX ADMIN — FENTANYL CITRATE 25 MCG: 50 INJECTION, SOLUTION INTRAMUSCULAR; INTRAVENOUS at 14:51

## 2020-12-17 RX ADMIN — PROPOFOL 200 MG: 10 INJECTION, EMULSION INTRAVENOUS at 13:40

## 2020-12-17 RX ADMIN — ONDANSETRON 4 MG: 2 INJECTION INTRAMUSCULAR; INTRAVENOUS at 15:49

## 2020-12-17 RX ADMIN — FENTANYL CITRATE 50 MCG: 50 INJECTION, SOLUTION INTRAMUSCULAR; INTRAVENOUS at 13:35

## 2020-12-17 RX ADMIN — ONDANSETRON HYDROCHLORIDE 4 MG: 2 INJECTION INTRAMUSCULAR; INTRAVENOUS at 13:47

## 2020-12-17 NOTE — PERIOP NOTES
aware that pt is requesting pain medication pain 10/10. He stated that they are getting ready roll to the OR. He is not giving orders at this time.

## 2020-12-17 NOTE — INTERVAL H&P NOTE
Update History & Physical 
 
The Patient's History and Physical of November 17, was reviewed with the patient and I examined the patient. There was no change. The surgical site was confirmed by the patient and me. Plan:  The risk, benefits, expected outcome, and alternative to the recommended procedure have been discussed with the patient. Patient understands and wants to proceed with the procedure.  
 
Electronically signed by Yoni Lechuga MD on 12/17/2020 at 12:56 PM

## 2020-12-17 NOTE — PERIOP NOTES
Notified Dm Cook RN that patient had 500mg of Lodine yesterday. She states she will notify Dr. Jeannie Horn and call back if there are any questions, concerns, or new orders.

## 2020-12-17 NOTE — DISCHARGE INSTRUCTIONS
DISCHARGE SUMMARY from Nurse    PATIENT INSTRUCTIONS:    After general anesthesia or intravenous sedation, for 24 hours or while taking prescription Narcotics:  · Limit your activities  · Do not drive and operate hazardous machinery  · Do not make important personal or business decisions  · Do  not drink alcoholic beverages  · If you have not urinated within 8 hours after discharge, please contact your surgeon on call. Report the following to your surgeon:  · Excessive pain, swelling, redness or odor of or around the surgical area  · Temperature over 100.5  · Nausea and vomiting lasting longer than 4 hours or if unable to take medications  · Any signs of decreased circulation or nerve impairment to extremity: change in color, persistent  numbness, tingling, coldness or increase pain  · Any questions      DISCHARGE SUMMARY from Nurse    PATIENT INSTRUCTIONS:    After general anesthesia or intravenous sedation, for 24 hours or while taking prescription Narcotics:  · Limit your activities  · Do not drive and operate hazardous machinery  · Do not make important personal or business decisions  · Do  not drink alcoholic beverages  · If you have not urinated within 8 hours after discharge, please contact your surgeon on call. Report the following to your surgeon:  · Excessive pain, swelling, redness or odor of or around the surgical area  · Temperature over 100.5  · Nausea and vomiting lasting longer than 4 hours or if unable to take medications  · Any signs of decreased circulation or nerve impairment to extremity: change in color, persistent  numbness, tingling, coldness or increase pain  · Any questions    What to do at Home:  Recommended activity: Activity as tolerated, Ambulate in house and No driving while on analgesics,     If you experience any of the following symptoms fever, chills, nausea and vomiting, and/or uncontrollable pain , please follow up with Dr. Thuan Hawkins.     *  Please give a list of your current medications to your Primary Care Provider. *  Please update this list whenever your medications are discontinued, doses are      changed, or new medications (including over-the-counter products) are added. *  Please carry medication information at all times in case of emergency situations. These are general instructions for a healthy lifestyle:    No smoking/ No tobacco products/ Avoid exposure to second hand smoke  Surgeon General's Warning:  Quitting smoking now greatly reduces serious risk to your health. Obesity, smoking, and sedentary lifestyle greatly increases your risk for illness    A healthy diet, regular physical exercise & weight monitoring are important for maintaining a healthy lifestyle    You may be retaining fluid if you have a history of heart failure or if you experience any of the following symptoms:  Weight gain of 3 pounds or more overnight or 5 pounds in a week, increased swelling in our hands or feet or shortness of breath while lying flat in bed. Please call your doctor as soon as you notice any of these symptoms; do not wait until your next office visit. The discharge information has been reviewed with the patient and caregiver. The patient and caregiver verbalized understanding. Discharge medications reviewed with the patient and caregiver and appropriate educational materials and side effects teaching were provided. ___________________________________________________________________________________________________________________________________  Learning About Coronavirus (LYKXO-19)  Coronavirus [phone]): Overview  What is coronavirus (MIFUJ-67)? The coronavirus disease (COVID-19) is caused by a virus. It is an illness that was first found in [place], [place], in December 2019. It has since spread worldwide. The virus can cause fever, cough, and trouble breathing. In severe cases, it can cause pneumonia and make it hard to breathe without help.  It can cause death.  Coronaviruses are a large group of viruses. They cause the common cold. They also cause more serious illnesses like Middle East respiratory syndrome (MERS) and severe acute respiratory syndrome (SARS). COVID-19 is caused by a novel coronavirus. That means it's a new type that has not been seen in people before. This virus spreads person-to-person through droplets from coughing and sneezing. It can also spread when you are close to someone who is infected. And it can spread when you touch something that has the virus on it, such as a doorknob or a tabletop. What can you do to protect yourself from coronavirus (COVID-19)? The best way to protect yourself from getting sick is to:  · Avoid areas where there is an outbreak. · Avoid contact with people who may be infected. · Wash your hands often with soap or alcohol-based hand sanitizers. · Avoid crowds and try to stay at least 6 feet away from other people. · Wash your hands often, especially after you cough or sneeze. Use soap and water, and scrub for at least 20 seconds. If soap and water aren't available, use an alcohol-based hand . · Avoid touching your mouth, nose, and eyes. What can you do to avoid spreading the virus to others? To help avoid spreading the virus to others:  · Cover your mouth with a tissue when you cough or sneeze. Then throw the tissue in the trash. · Use a disinfectant to clean things that you touch often. · Stay home if you are sick or have been exposed to the virus. Don't go to school, work, or public areas. And don't use public transportation. · If you are sick:  ? Leave your home only if you need to get medical care. But call the doctor's office first so they know you're coming. And wear a face mask, if you have one.  ? If you have a face mask, wear it whenever you're around other people. It can help stop the spread of the virus when you cough or sneeze. ? Clean and disinfect your home every day.  Use household  and disinfectant wipes or sprays. Take special care to clean things that you grab with your hands. These include doorknobs, remote controls, phones, and handles on your refrigerator and microwave. And don't forget countertops, tabletops, bathrooms, and computer keyboards. When to call for help  Call 911 anytime you think you may need emergency care. For example, call if:  · You have severe trouble breathing. (You can't talk at all.)  · You have constant chest pain or pressure. · You are severely dizzy or lightheaded. · You are confused or can't think clearly. · Your face and lips have a blue color. · You pass out (lose consciousness) or are very hard to wake up. Call your doctor now if you develop symptoms such as:  · Shortness of breath. · Fever. · Cough. If you need to get care, call ahead to the doctor's office for instructions before you go. Make sure you wear a face mask, if you have one, to prevent exposing other people to the virus. Where can you get the latest information? The following health organizations are tracking and studying this virus. Their websites contain the most up-to-date information. Alyce Naty also learn what to do if you think you may have been exposed to the virus. · U.S. Centers for Disease Control and Prevention (CDC): The CDC provides updated news about the disease and travel advice. The website also tells you how to prevent the spread of infection. www.cdc.gov  · World Health Organization [facility]): WHO offers information about the virus outbreaks. WHO also has travel advice. www.who.int  Current as of: April 1, 2020               Content Version: 12.4  © 5732-7656 Healthwise, Incorporated.    Care instructions adapted under license by your healthcare professional. If you have questions about a medical condition or this instruction, always ask your healthcare professional. [address] any warranty or liability for your use of this information.     Patient {ARMBANDS:20124}

## 2020-12-17 NOTE — ANESTHESIA POSTPROCEDURE EVALUATION
Post-Anesthesia Evaluation and Assessment    Cardiovascular Function/Vital Signs  Visit Vitals  /80   Pulse 91   Temp 36.7 °C (98.1 °F)   Resp 21   Ht 5' 7\" (1.702 m)   Wt 55.4 kg (122 lb 1 oz)   SpO2 100%   BMI 19.12 kg/m²       Patient is status post Procedure(s): HYSTERCOPY, DILATATION AND CURETTAGE. Nausea/Vomiting: Controlled. Postoperative hydration reviewed and adequate. Pain:  Pain Scale 1: FLACC (12/17/20 1444)  Pain Intensity 1: 0 (12/17/20 1444)   Managed. Neurological Status:   Neuro (WDL): Exceptions to WDL (12/17/20 1415)   At baseline. Mental Status and Level of Consciousness: Arousable. Pulmonary Status:   O2 Device: Nasal cannula (12/17/20 1440)   Adequate oxygenation and airway patent. Complications related to anesthesia: None    Post-anesthesia assessment completed. No concerns. Patient has met all discharge requirements.     Signed By: Dick Ayon CRNA    December 17, 2020

## 2020-12-17 NOTE — BRIEF OP NOTE
Brief Postoperative Note    Patient: Jaylon Cardona  YOB: 1980  MRN: [id number]    Date of Procedure: 12/17/2020     Pre-Op Diagnosis: THICKENED ENDOMETRIUM    Post-Op Diagnosis: Same as preoperative diagnosis. Procedure(s): HYSTERCOPY, DILATATION AND CURETTAGE    Surgeon(s):  Orion Jimenez MD    Surgical Assistant: Surg Asst-1: [facility]    Anesthesia: General     Estimated Blood Loss (mL): 632    Complications: None    Specimens:   ID Type Source Tests Collected by Time Destination   1 : CERVICAL BIOPSY Preservative Cervical  Orion Jimenez MD 12/17/2020 1349 Pathology   2 : ENDOMETRIAL CURETTINGS Preservative Endometrial Curetting  Orion Jimenez MD 12/17/2020 1356 Pathology        Implants: * No implants in log *    Drains: * No LDAs found *    Findings: polypoid tissue at cervical os.  Hysteroscopy revealed large amount fleshy, polypoid tissue    Electronically Signed by Starr Rutherford MD on 12/17/2020 at 2:07 PM

## 2020-12-17 NOTE — ANESTHESIA PREPROCEDURE EVALUATION
Relevant Problems   No relevant active problems       Anesthetic History               Review of Systems / Medical History  Patient summary reviewed, nursing notes reviewed and pertinent labs reviewed    Pulmonary  Within defined limits                 Neuro/Psych     seizures         Cardiovascular                  Exercise tolerance: >4 METS     GI/Hepatic/Renal     GERD           Endo/Other  Within defined limits           Other Findings              Physical Exam    Airway  Mallampati: II  TM Distance: 4 - 6 cm  Neck ROM: normal range of motion   Mouth opening: Normal     Cardiovascular  Regular rate and rhythm,  S1 and S2 normal,  no murmur, click, rub, or gallop             Dental         Pulmonary  Breath sounds clear to auscultation               Abdominal  GI exam deferred       Other Findings            Anesthetic Plan    ASA: 2  Anesthesia type: general          Induction: Intravenous  Anesthetic plan and risks discussed with: Patient

## 2020-12-18 NOTE — OP NOTES
[facility]  OPERATIVE REPORT    Name:  Tash Santillan  MR#:   [id number]  :  1980  ACCOUNT #:  [de-identified]  DATE OF SERVICE:  2020    PREOPERATIVE DIAGNOSES:  Thickened endometrium, pelvic pain. POSTOPERATIVE DIAGNOSES:  Thickened endometrium, pelvic pain. PROCEDURES PERFORMED:  Hysteroscopy, D and C, and cervical biopsy. SURGEON:  Brad Peters DO    ASSISTANT:  Lindsey Alonso. ANESTHESIA:  LMA. FLUIDS:  1000 mL. COMPLICATIONS:  None. SPECIMENS REMOVED:  Endometrial curettings and a cervical biopsy. IMPLANTS:  None. ESTIMATED BLOOD LOSS:  150 mL. URINE OUTPUT:  Not recorded. FINDINGS:  Exam under anesthesia revealed a large bulky cervix with a polypoid lesion at the cervix which was biopsied. Hysteroscopic findings revealed a large amount of fleshy polypoid tissue filling the endometrial cavity. INDICATIONS:  The patient is a 26-year-old who had an MRI for lower back pain and showed endometrial canal expanded with heterogenous signal intensity. She was then seen in the emergency room, and a CT revealed a cervical mass and a left adnexal mass. She then was seen by GYN and had an ultrasound revealing a regular mass in the endometrium and a regular shaped cervix. She presents today for further evaluation. PROCEDURE:  The patient was taken to the operating room where anesthesia was obtained without difficulty. She was placed in the dorsal lithotomy position, prepped and draped in normal sterile fashion. Surgical time-out was taken by the entire surgical team.  A sterile speculum was then placed in the patient's vagina. Anterior lip of the cervix was grasped with single-tooth tenaculum. At this point, the cervical lesion was biopsied. Next, the cervix was dilated. Hysteroscopy was performed with the findings above. The cervix was then further dilated, and endometrial curettage was performed with a large amount of tissue.   Once complete, she was continuing to have bleeding. Some bleeding at the cervix was addressed using monopolar cautery, and Surgicel was packed into the cervical canal which seemed to offer better hemostasis. The tenaculum was removed. The speculum was removed. The patient tolerated the procedure well. Sponge, needle, and instrument counts were correct x2, and the patient was taken to recovery room in stable condition.       Krystal Kendall DO [id number]  D:  12/17/2020 14:12  T:  12/17/2020 19:31  JOB #:  [id number]

## 2021-01-05 ENCOUNTER — TELEPHONE (OUTPATIENT)
Dept: ONCOLOGY | Age: 41
End: 2021-01-05

## 2021-01-05 ENCOUNTER — OFFICE VISIT (OUTPATIENT)
Dept: ONCOLOGY | Age: 41
End: 2021-01-05
Payer: COMMERCIAL

## 2021-01-05 VITALS
BODY MASS INDEX: 18.93 KG/M2 | TEMPERATURE: 96.4 F | OXYGEN SATURATION: 100 % | WEIGHT: 120.6 LBS | SYSTOLIC BLOOD PRESSURE: 121 MMHG | DIASTOLIC BLOOD PRESSURE: 67 MMHG | HEART RATE: 101 BPM | HEIGHT: 67 IN

## 2021-01-05 DIAGNOSIS — C54.1 ENDOMETRIAL CANCER (HCC): Primary | ICD-10-CM

## 2021-01-05 PROCEDURE — 99024 POSTOP FOLLOW-UP VISIT: CPT | Performed by: OBSTETRICS & GYNECOLOGY

## 2021-01-05 NOTE — LETTER
NOTIFICATION RETURN TO WORK / SCHOOL 
 
1/5/2021 1:39 PM 
 
Ms. [facility] [address] [place] To Whom It May Concern: 
 
[facility] is currently under the care of [address]. Ms. Francoise Ocasio was seen in the office today and has a new diagnosis of endometrial cancer. Pt will be undergoing a major abdominal surgery on 1/14/2021 and will need to be out of work approximately 5 weeks starting 1/7/2021 for quarantine and preparation of surgery. If there are questions or concerns please have the patient contact our office. Sincerely, Ha Hernandez MD

## 2021-01-05 NOTE — TELEPHONE ENCOUNTER
Pt needs a letter stating she will need to be out of work for surgery and quarantine following COVID 19 test. Pt also needs letter to state her cancer dx.

## 2021-01-05 NOTE — PROGRESS NOTES
[facility]  [address], [address], [address] [facility]  [address], [address] [place], [place] [address]in [place]   [phone]  Armin Anglin DO      Postoperative Office Note  Patient ID:  Name: Ester Dougherty  MRM: [id number]  : 1980/40 y.o. Date: 2021    SUBJECTIVE:    This is a 36 y.o.  female who presents s/p Hysteroscopy, D and C, and cervical biopsy on 2020  Currently she has no problems with eating, bowel movements, voiding, or their wound  Appetite is poor. Eating a regular diet without difficulty. Urinating without difficulty. Bowel movements are regular. Patient having back pain with some abdominal pain. Her pathology revealed      A: CERVIX, BIOPSY:   ADENOCARCINOMA, WELL-DIFFERENTIATED (PLEASE SEE COMMENT). NO CERVICAL TISSUE SEEN.   B: ENDOMETRIAL CURETTINGS, BIOPSY:   ADENOCARCINOMA, WELL-DIFFERENTIATED (PLEASE SEE COMMENT). Addendum Diagnosis   Endometrial adenocarcinoma, endometrioid type, FIGO grade 1 (please see comment). Imaging  CT  10/31/2020  FINDINGS:     LOWER CHEST: Moderate size hiatal hernia.     LIVER, BILIARY: Liver is normal. No biliary dilation. Gallbladder is  unremarkable.     PANCREAS: Normal.     SPLEEN: Normal.     ADRENALS: Normal.     KIDNEYS: Normal.     LYMPH NODES: No enlarged lymph nodes.     GASTROINTESTINAL TRACT: No bowel dilation or wall thickening. Normal appendix.     PELVIC ORGANS: Endometrium is distended with heterogeneous attenuation. Some is  low attenuation however there are areas of high attenuation within this. This  extends down to the cervical region which is prominent and also heterogeneous. There is a complex cystic mass in the left adnexal region which is probably  ovarian. There appear to be several cysts within septations measuring 4.5 x 3.5  cm. Right ovary is normal. There is some stranding of the fat posterior to the  cervix. Small amount of fluid adjacent to the right ovary.     VASCULATURE: Unremarkable.     BONES: No acute or aggressive osseous abnormalities identified.     OTHER: None.     _______________     IMPRESSION  IMPRESSION:     Uterus is abnormal with dilated endometrial canal measuring up to 7.8 cm  transversely. Heterogeneous material within this. Cervix is also prominent. Differential diagnosis includes hydrocodone process. This could be secondary to  cervical mass. 2. Enlarged left ovary with multiple cysts. Medications:     Current Outpatient Medications on File Prior to Visit   Medication Sig Dispense Refill    mv-min/iron/folic/calcium/vitK (WOMEN'S MULTIVITAMIN PO) Take  by mouth daily.  methocarbamoL (ROBAXIN) 500 mg tablet Take 500 mg by mouth nightly.  omeprazole (PriLOSEC OTC) 20 mg tablet Take 20 mg by mouth daily.  cyclobenzaprine (FLEXERIL) 10 mg tablet Take  by mouth three (3) times daily as needed for Muscle Spasm(s).  etodolac (LODINE) 500 mg tablet Take 500 mg by mouth two (2) times a day. Indications: pain      ondansetron (Zofran ODT) 4 mg disintegrating tablet Take 1 Tab by mouth every eight (8) hours as needed for Nausea (or vomiting.). Allow to dissolve on tongue 12 Tab 0     No current facility-administered medications on file prior to visit. Allergies: Allergies   Allergen Reactions    Codeine Hives       OBJECTIVE:    Vitals:   Visit Vitals  /67 (BP 1 Location: Right arm, BP Patient Position: Sitting)   Pulse (!) 101   Temp (!) 96.4 °F (35.8 °C) (Temporal)   Ht 5' 7\" (1.702 m)   Wt 54.7 kg (120 lb 9.6 oz)   SpO2 100%   BMI 18.89 kg/m²       Physical Examination:    General:  alert, cooperative, no distress   Incision: N/A   Pelvic: deferred   Rectal: not done   Extremity:   extremities normal, atraumatic, no cyanosis or edema     IMPRESSION/PLAN:    Art Mccrary is Doing well postoperatively. .  She has a working diagnosis of grade 1 endometrial cancer  1.  Grade 1 Endometrial Cancer   -reviewed her pathology and discussed concern for stage II disease.    -discussed recommendation for hysterectomy/bso, with staging   -discussed robotic approach   -briefly discussed possible  need for adjuvant treatment   -Risks, benefits and alternatives of surgery discussed in detail   -all of patients questions and concerns addressed        Chacha Gómez DO  Gynecologic Oncology  1/5/2021/8:54 AM

## 2021-01-05 NOTE — PROGRESS NOTES
Patrizia Medrano is a 36 y.o. female presents in office for 2 week follow up. Chief Complaint   Patient presents with    Follow-up        Pt says she vomited every other day last week but has subsided. Pt c/o decreased appetite  Do you have any unusual vaginal bleeding, discharge or irritation? Pt c/o slight discharge clear/yellow, no smell. Do you have any changes in your bowel movements? Pt c/o constipation relieved with senokot. Have you been experiencing nausea or vomiting? Pt c/o on and off nausea  Have you been experiencing any continuous or worsening abdominal pain? no  Any urinary burning? no    Visit Vitals  /67 (BP 1 Location: Right arm, BP Patient Position: Sitting)   Pulse (!) 101   Temp (!) 96.4 °F (35.8 °C) (Temporal)   Ht 5' 7\" (1.702 m)   Wt 120 lb 9.6 oz (54.7 kg)   SpO2 100%   BMI 18.89 kg/m²         1. Have you been to the ER, urgent care clinic since your last visit? Hospitalized since your last visit? No    2. Have you seen or consulted any other health care providers outside of the [address] [place] [address] since your last visit? Include any pap smears or colon screening. No    3 most recent PHQ Screens 11/17/2020   Little interest or pleasure in doing things Not at all   Feeling down, depressed, irritable, or hopeless Not at all   Total Score PHQ 2 0       No flowsheet data found. No flowsheet data found. No flowsheet data found.

## 2021-01-08 ENCOUNTER — HOSPITAL ENCOUNTER (OUTPATIENT)
Dept: PREADMISSION TESTING | Age: 41
Discharge: HOME | End: 2021-01-08
Payer: COMMERCIAL

## 2021-01-08 ENCOUNTER — TELEPHONE (OUTPATIENT)
Dept: ONCOLOGY | Age: 41
End: 2021-01-08

## 2021-01-08 PROCEDURE — 87635 SARS-COV-2 COVID-19 AMP PRB: CPT

## 2021-01-08 NOTE — TELEPHONE ENCOUNTER
Patient contacted the office with questions regarding her bowel prep. She wanted to know if she could start the bowel prep later in the day or if she could eat a regular diet after taking the Miralax. She states that she does not like broth or ensure and is concerned with her weight being low already. She mainly eats chicken, beef, and pork. Explained that I would need to discuss this with Dr. Hardeep Wilkins on Monday and will call her back, patient agreeable with plan.

## 2021-01-09 LAB — SARS-COV-2, COV2NT: NOT DETECTED

## 2021-01-10 DIAGNOSIS — R10.2 PELVIC PAIN: Primary | ICD-10-CM

## 2021-01-10 RX ORDER — OXYCODONE AND ACETAMINOPHEN 5; 325 MG/1; MG/1
1 TABLET ORAL
Qty: 60 TAB | Refills: 0 | Status: SHIPPED | OUTPATIENT
Start: 2021-01-10 | End: 2021-01-22

## 2021-01-13 ENCOUNTER — TELEPHONE (OUTPATIENT)
Dept: ONCOLOGY | Age: 41
End: 2021-01-13

## 2021-01-13 ENCOUNTER — ANESTHESIA EVENT (OUTPATIENT)
Dept: SURGERY | Age: 41
End: 2021-01-13
Payer: COMMERCIAL

## 2021-01-13 NOTE — TELEPHONE ENCOUNTER
Patient's  contacted the office stating that Mrs. Ryan Munoz is having severe cramping due to the Miralax for bowel prep. Pt is vomiting due to abdominal pain and nausea, unable to hold down Gatoraid. Patient had 2 1/2 tbsp of Miralax. Pt's  inquired about going to ED, but was unsure if it would interfere with planned surgery. He can be reached at [phone].

## 2021-01-13 NOTE — TELEPHONE ENCOUNTER
Left a message confirming patients arrival and surgery start time for 1/14/2021. Patient was informed on her voicemail that a case did get dropped and her case was moved up with an arrival time of 9:30 AM with a 11:30 AM surgery start time.

## 2021-01-13 NOTE — TELEPHONE ENCOUNTER
Returned call.having increased pain with vomiting since taking miralax. Explained they could go to ED and have evaluation.  Pt  Reluctant and will try some ice chips at home and see if it passes  Asked them to let us know if they decided to go to ED

## 2021-01-14 ENCOUNTER — ANESTHESIA (OUTPATIENT)
Dept: SURGERY | Age: 41
End: 2021-01-14
Payer: COMMERCIAL

## 2021-01-14 ENCOUNTER — HOSPITAL ENCOUNTER (OUTPATIENT)
Age: 41
Setting detail: OUTPATIENT SURGERY
Discharge: HOME | End: 2021-01-14
Attending: OBSTETRICS & GYNECOLOGY | Admitting: OBSTETRICS & GYNECOLOGY
Payer: COMMERCIAL

## 2021-01-14 VITALS
WEIGHT: 118.25 LBS | TEMPERATURE: 97.9 F | HEIGHT: 67 IN | BODY MASS INDEX: 18.56 KG/M2 | OXYGEN SATURATION: 99 % | RESPIRATION RATE: 14 BRPM | SYSTOLIC BLOOD PRESSURE: 110 MMHG | DIASTOLIC BLOOD PRESSURE: 70 MMHG | HEART RATE: 97 BPM

## 2021-01-14 DIAGNOSIS — C54.1 ENDOMETRIAL CANCER (HCC): ICD-10-CM

## 2021-01-14 LAB
ABO + RH BLD: NORMAL
BLOOD GROUP ANTIBODIES SERPL: NORMAL
HCT VFR BLD AUTO: 33.7 % (ref 35–45)
HGB BLD-MCNC: 9.3 G/DL (ref 12–16)
SPECIMEN EXP DATE BLD: NORMAL

## 2021-01-14 PROCEDURE — 36415 COLL VENOUS BLD VENIPUNCTURE: CPT

## 2021-01-14 PROCEDURE — 76210000026 HC REC RM PH II 1 TO 1.5 HR: Performed by: OBSTETRICS & GYNECOLOGY

## 2021-01-14 PROCEDURE — 74011250636 HC RX REV CODE- 250/636: Performed by: ANESTHESIOLOGY

## 2021-01-14 PROCEDURE — 77030025805 HC MANIP UTER RUMI COOP -B: Performed by: OBSTETRICS & GYNECOLOGY

## 2021-01-14 PROCEDURE — 77030008477 HC STYL SATN SLP COVD -A: Performed by: ANESTHESIOLOGY

## 2021-01-14 PROCEDURE — 76210000001 HC OR PH I REC 2.5 TO 3 HR: Performed by: OBSTETRICS & GYNECOLOGY

## 2021-01-14 PROCEDURE — 74011250636 HC RX REV CODE- 250/636: Performed by: OBSTETRICS & GYNECOLOGY

## 2021-01-14 PROCEDURE — 77030040830 HC CATH URETH FOL MDII -A: Performed by: OBSTETRICS & GYNECOLOGY

## 2021-01-14 PROCEDURE — 86901 BLOOD TYPING SEROLOGIC RH(D): CPT

## 2021-01-14 PROCEDURE — 88332 PATH CONSLTJ SURG EA ADD BLK: CPT

## 2021-01-14 PROCEDURE — 77030006643: Performed by: ANESTHESIOLOGY

## 2021-01-14 PROCEDURE — 77030019908 HC STETH ESOPH SIMS -A: Performed by: ANESTHESIOLOGY

## 2021-01-14 PROCEDURE — 77030028402 HC SYS LAPSC TISS RETRV AMR -B: Performed by: OBSTETRICS & GYNECOLOGY

## 2021-01-14 PROCEDURE — 77030002966 HC SUT PDS J&J -A: Performed by: OBSTETRICS & GYNECOLOGY

## 2021-01-14 PROCEDURE — 77030035277 HC OBTRTR BLDELSS DISP INTU -B: Performed by: OBSTETRICS & GYNECOLOGY

## 2021-01-14 PROCEDURE — 88307 TISSUE EXAM BY PATHOLOGIST: CPT

## 2021-01-14 PROCEDURE — C9290 INJ, BUPIVACAINE LIPOSOME: HCPCS | Performed by: OBSTETRICS & GYNECOLOGY

## 2021-01-14 PROCEDURE — 74011000250 HC RX REV CODE- 250: Performed by: ANESTHESIOLOGY

## 2021-01-14 PROCEDURE — 74011250637 HC RX REV CODE- 250/637: Performed by: ANESTHESIOLOGY

## 2021-01-14 PROCEDURE — 77030016151 HC PROTCTR LNS DFOG COVD -B: Performed by: OBSTETRICS & GYNECOLOGY

## 2021-01-14 PROCEDURE — 58571 TLH W/T/O 250 G OR LESS: CPT | Performed by: OBSTETRICS & GYNECOLOGY

## 2021-01-14 PROCEDURE — 88305 TISSUE EXAM BY PATHOLOGIST: CPT

## 2021-01-14 PROCEDURE — 88112 CYTOPATH CELL ENHANCE TECH: CPT

## 2021-01-14 PROCEDURE — 77030020703 HC SEAL CANN DISP INTU -B: Performed by: OBSTETRICS & GYNECOLOGY

## 2021-01-14 PROCEDURE — 76010000877 HC OR TIME 2.5 TO 3HR INTENSV - TIER 2: Performed by: OBSTETRICS & GYNECOLOGY

## 2021-01-14 PROCEDURE — 77030014063 HC TIP MANIP UTER COOP -B: Performed by: OBSTETRICS & GYNECOLOGY

## 2021-01-14 PROCEDURE — 88309 TISSUE EXAM BY PATHOLOGIST: CPT

## 2021-01-14 PROCEDURE — 85014 HEMATOCRIT: CPT

## 2021-01-14 PROCEDURE — 77030040361 HC SLV COMPR DVT MDII -B: Performed by: OBSTETRICS & GYNECOLOGY

## 2021-01-14 PROCEDURE — 77030037241 HC PRT ACC BLDLSS AIRSEAL CNMD -B: Performed by: OBSTETRICS & GYNECOLOGY

## 2021-01-14 PROCEDURE — 77030033162 HC PCH SPEC RTVR ENDOSC AMR -B: Performed by: OBSTETRICS & GYNECOLOGY

## 2021-01-14 PROCEDURE — 74011000258 HC RX REV CODE- 258: Performed by: NURSE ANESTHETIST, CERTIFIED REGISTERED

## 2021-01-14 PROCEDURE — 77030020782 HC GWN BAIR PAWS FLX 3M -B: Performed by: OBSTETRICS & GYNECOLOGY

## 2021-01-14 PROCEDURE — 77030002933 HC SUT MCRYL J&J -A: Performed by: OBSTETRICS & GYNECOLOGY

## 2021-01-14 PROCEDURE — 88331 PATH CONSLTJ SURG 1 BLK 1SPC: CPT

## 2021-01-14 PROCEDURE — 77030008683 HC TU ET CUF COVD -A: Performed by: ANESTHESIOLOGY

## 2021-01-14 PROCEDURE — 77030032060 HC PWDR HEMSTAT ARISTA ASRB 3GM BARD -C: Performed by: OBSTETRICS & GYNECOLOGY

## 2021-01-14 PROCEDURE — 77030008574 HC TBNG SUC IRR STRY -B: Performed by: OBSTETRICS & GYNECOLOGY

## 2021-01-14 PROCEDURE — 76060000036 HC ANESTHESIA 2.5 TO 3 HR: Performed by: OBSTETRICS & GYNECOLOGY

## 2021-01-14 PROCEDURE — 2709999900 HC NON-CHARGEABLE SUPPLY: Performed by: OBSTETRICS & GYNECOLOGY

## 2021-01-14 PROCEDURE — 74011000250 HC RX REV CODE- 250: Performed by: OBSTETRICS & GYNECOLOGY

## 2021-01-14 PROCEDURE — 74011250636 HC RX REV CODE- 250/636: Performed by: NURSE ANESTHETIST, CERTIFIED REGISTERED

## 2021-01-14 PROCEDURE — 38572 LAPAROSCOPY LYMPHADENECTOMY: CPT | Performed by: OBSTETRICS & GYNECOLOGY

## 2021-01-14 PROCEDURE — 74011000272 HC RX REV CODE- 272: Performed by: OBSTETRICS & GYNECOLOGY

## 2021-01-14 RX ORDER — ONDANSETRON 2 MG/ML
4 INJECTION INTRAMUSCULAR; INTRAVENOUS ONCE
Status: COMPLETED | OUTPATIENT
Start: 2021-01-14 | End: 2021-01-14

## 2021-01-14 RX ORDER — GLYCOPYRROLATE 0.2 MG/ML
INJECTION INTRAMUSCULAR; INTRAVENOUS AS NEEDED
Status: DISCONTINUED | OUTPATIENT
Start: 2021-01-14 | End: 2021-01-14 | Stop reason: HOSPADM

## 2021-01-14 RX ORDER — ACETAMINOPHEN 500 MG
1000 TABLET ORAL
Status: COMPLETED | OUTPATIENT
Start: 2021-01-14 | End: 2021-01-14

## 2021-01-14 RX ORDER — SODIUM CHLORIDE, SODIUM LACTATE, POTASSIUM CHLORIDE, CALCIUM CHLORIDE 600; 310; 30; 20 MG/100ML; MG/100ML; MG/100ML; MG/100ML
50 INJECTION, SOLUTION INTRAVENOUS CONTINUOUS
Status: DISCONTINUED | OUTPATIENT
Start: 2021-01-14 | End: 2021-01-14 | Stop reason: HOSPADM

## 2021-01-14 RX ORDER — NALOXONE HYDROCHLORIDE 0.4 MG/ML
0.2 INJECTION, SOLUTION INTRAMUSCULAR; INTRAVENOUS; SUBCUTANEOUS AS NEEDED
Status: DISCONTINUED | OUTPATIENT
Start: 2021-01-14 | End: 2021-01-14 | Stop reason: HOSPADM

## 2021-01-14 RX ORDER — ALBUTEROL SULFATE 0.83 MG/ML
2.5 SOLUTION RESPIRATORY (INHALATION) AS NEEDED
Status: DISCONTINUED | OUTPATIENT
Start: 2021-01-14 | End: 2021-01-14 | Stop reason: HOSPADM

## 2021-01-14 RX ORDER — FENTANYL CITRATE 50 UG/ML
INJECTION, SOLUTION INTRAMUSCULAR; INTRAVENOUS AS NEEDED
Status: DISCONTINUED | OUTPATIENT
Start: 2021-01-14 | End: 2021-01-14 | Stop reason: HOSPADM

## 2021-01-14 RX ORDER — KETAMINE HYDROCHLORIDE 10 MG/ML
INJECTION, SOLUTION INTRAMUSCULAR; INTRAVENOUS AS NEEDED
Status: DISCONTINUED | OUTPATIENT
Start: 2021-01-14 | End: 2021-01-14 | Stop reason: HOSPADM

## 2021-01-14 RX ORDER — ONDANSETRON 4 MG/1
4 TABLET, FILM COATED ORAL EVERY 6 HOURS
Qty: 30 TAB | Refills: 0 | Status: SHIPPED | OUTPATIENT
Start: 2021-01-14

## 2021-01-14 RX ORDER — ESMOLOL HYDROCHLORIDE 10 MG/ML
INJECTION INTRAVENOUS AS NEEDED
Status: DISCONTINUED | OUTPATIENT
Start: 2021-01-14 | End: 2021-01-14 | Stop reason: HOSPADM

## 2021-01-14 RX ORDER — FLUMAZENIL 0.1 MG/ML
0.2 INJECTION INTRAVENOUS
Status: DISCONTINUED | OUTPATIENT
Start: 2021-01-14 | End: 2021-01-14 | Stop reason: HOSPADM

## 2021-01-14 RX ORDER — PROPOFOL 10 MG/ML
INJECTION, EMULSION INTRAVENOUS AS NEEDED
Status: DISCONTINUED | OUTPATIENT
Start: 2021-01-14 | End: 2021-01-14 | Stop reason: HOSPADM

## 2021-01-14 RX ORDER — DIPHENHYDRAMINE HYDROCHLORIDE 50 MG/ML
12.5 INJECTION, SOLUTION INTRAMUSCULAR; INTRAVENOUS
Status: DISCONTINUED | OUTPATIENT
Start: 2021-01-14 | End: 2021-01-14 | Stop reason: HOSPADM

## 2021-01-14 RX ORDER — FENTANYL CITRATE 50 UG/ML
25 INJECTION, SOLUTION INTRAMUSCULAR; INTRAVENOUS AS NEEDED
Status: DISCONTINUED | OUTPATIENT
Start: 2021-01-14 | End: 2021-01-14 | Stop reason: HOSPADM

## 2021-01-14 RX ORDER — ROCURONIUM BROMIDE 10 MG/ML
INJECTION, SOLUTION INTRAVENOUS AS NEEDED
Status: DISCONTINUED | OUTPATIENT
Start: 2021-01-14 | End: 2021-01-14 | Stop reason: HOSPADM

## 2021-01-14 RX ORDER — ONDANSETRON 2 MG/ML
INJECTION INTRAMUSCULAR; INTRAVENOUS AS NEEDED
Status: DISCONTINUED | OUTPATIENT
Start: 2021-01-14 | End: 2021-01-14 | Stop reason: HOSPADM

## 2021-01-14 RX ORDER — OXYCODONE AND ACETAMINOPHEN 5; 325 MG/1; MG/1
1 TABLET ORAL AS NEEDED
Status: DISCONTINUED | OUTPATIENT
Start: 2021-01-14 | End: 2021-01-14 | Stop reason: HOSPADM

## 2021-01-14 RX ORDER — CEFAZOLIN SODIUM/WATER 2 G/20 ML
2 SYRINGE (ML) INTRAVENOUS ONCE
Status: DISCONTINUED | OUTPATIENT
Start: 2021-01-14 | End: 2021-01-14 | Stop reason: HOSPADM

## 2021-01-14 RX ORDER — HYDROMORPHONE HYDROCHLORIDE 2 MG/ML
INJECTION, SOLUTION INTRAMUSCULAR; INTRAVENOUS; SUBCUTANEOUS AS NEEDED
Status: DISCONTINUED | OUTPATIENT
Start: 2021-01-14 | End: 2021-01-14 | Stop reason: HOSPADM

## 2021-01-14 RX ORDER — SODIUM CHLORIDE, SODIUM LACTATE, POTASSIUM CHLORIDE, CALCIUM CHLORIDE 600; 310; 30; 20 MG/100ML; MG/100ML; MG/100ML; MG/100ML
1000 INJECTION, SOLUTION INTRAVENOUS CONTINUOUS
Status: DISCONTINUED | OUTPATIENT
Start: 2021-01-14 | End: 2021-01-14 | Stop reason: HOSPADM

## 2021-01-14 RX ORDER — HEPARIN SODIUM 5000 [USP'U]/ML
5000 INJECTION, SOLUTION INTRAVENOUS; SUBCUTANEOUS ONCE
Status: COMPLETED | OUTPATIENT
Start: 2021-01-14 | End: 2021-01-14

## 2021-01-14 RX ORDER — KETOROLAC TROMETHAMINE 15 MG/ML
INJECTION, SOLUTION INTRAMUSCULAR; INTRAVENOUS AS NEEDED
Status: DISCONTINUED | OUTPATIENT
Start: 2021-01-14 | End: 2021-01-14 | Stop reason: HOSPADM

## 2021-01-14 RX ORDER — METOCLOPRAMIDE HYDROCHLORIDE 5 MG/ML
INJECTION INTRAMUSCULAR; INTRAVENOUS AS NEEDED
Status: DISCONTINUED | OUTPATIENT
Start: 2021-01-14 | End: 2021-01-14 | Stop reason: HOSPADM

## 2021-01-14 RX ORDER — HYDROMORPHONE HYDROCHLORIDE 1 MG/ML
0.5 INJECTION, SOLUTION INTRAMUSCULAR; INTRAVENOUS; SUBCUTANEOUS
Status: DISCONTINUED | OUTPATIENT
Start: 2021-01-14 | End: 2021-01-14 | Stop reason: HOSPADM

## 2021-01-14 RX ORDER — SCOLOPAMINE TRANSDERMAL SYSTEM 1 MG/1
1 PATCH, EXTENDED RELEASE TRANSDERMAL
Status: DISCONTINUED | OUTPATIENT
Start: 2021-01-14 | End: 2021-01-14 | Stop reason: HOSPADM

## 2021-01-14 RX ORDER — DEXAMETHASONE SODIUM PHOSPHATE 4 MG/ML
INJECTION, SOLUTION INTRA-ARTICULAR; INTRALESIONAL; INTRAMUSCULAR; INTRAVENOUS; SOFT TISSUE AS NEEDED
Status: DISCONTINUED | OUTPATIENT
Start: 2021-01-14 | End: 2021-01-14 | Stop reason: HOSPADM

## 2021-01-14 RX ORDER — SODIUM CHLORIDE, SODIUM LACTATE, POTASSIUM CHLORIDE, CALCIUM CHLORIDE 600; 310; 30; 20 MG/100ML; MG/100ML; MG/100ML; MG/100ML
125 INJECTION, SOLUTION INTRAVENOUS CONTINUOUS
Status: DISCONTINUED | OUTPATIENT
Start: 2021-01-14 | End: 2021-01-14 | Stop reason: HOSPADM

## 2021-01-14 RX ORDER — NEOSTIGMINE METHYLSULFATE 1 MG/ML
INJECTION, SOLUTION INTRAVENOUS AS NEEDED
Status: DISCONTINUED | OUTPATIENT
Start: 2021-01-14 | End: 2021-01-14 | Stop reason: HOSPADM

## 2021-01-14 RX ORDER — MIDAZOLAM HYDROCHLORIDE 1 MG/ML
INJECTION, SOLUTION INTRAMUSCULAR; INTRAVENOUS AS NEEDED
Status: DISCONTINUED | OUTPATIENT
Start: 2021-01-14 | End: 2021-01-14 | Stop reason: HOSPADM

## 2021-01-14 RX ORDER — LIDOCAINE HYDROCHLORIDE 20 MG/ML
INJECTION, SOLUTION EPIDURAL; INFILTRATION; INTRACAUDAL; PERINEURAL AS NEEDED
Status: DISCONTINUED | OUTPATIENT
Start: 2021-01-14 | End: 2021-01-14 | Stop reason: HOSPADM

## 2021-01-14 RX ADMIN — OXYCODONE HYDROCHLORIDE AND ACETAMINOPHEN 1 TABLET: 5; 325 TABLET ORAL at 17:48

## 2021-01-14 RX ADMIN — HYDROMORPHONE HYDROCHLORIDE 0.5 MG: 2 INJECTION, SOLUTION INTRAMUSCULAR; INTRAVENOUS; SUBCUTANEOUS at 11:47

## 2021-01-14 RX ADMIN — ACETAMINOPHEN 500 MG: 500 TABLET ORAL at 10:05

## 2021-01-14 RX ADMIN — SODIUM CHLORIDE, SODIUM LACTATE, POTASSIUM CHLORIDE, AND CALCIUM CHLORIDE 50 ML/HR: 600; 310; 30; 20 INJECTION, SOLUTION INTRAVENOUS at 09:55

## 2021-01-14 RX ADMIN — DEXAMETHASONE SODIUM PHOSPHATE 4 MG: 4 INJECTION, SOLUTION INTRAMUSCULAR; INTRAVENOUS at 11:36

## 2021-01-14 RX ADMIN — PHENYLEPHRINE HYDROCHLORIDE 100 MCG: 10 INJECTION INTRAVENOUS at 13:34

## 2021-01-14 RX ADMIN — ONDANSETRON 4 MG: 2 INJECTION INTRAMUSCULAR; INTRAVENOUS at 15:51

## 2021-01-14 RX ADMIN — LIDOCAINE HYDROCHLORIDE 60 MG: 20 INJECTION, SOLUTION EPIDURAL; INFILTRATION; INTRACAUDAL; PERINEURAL at 11:16

## 2021-01-14 RX ADMIN — KETAMINE HYDROCHLORIDE 20 MG: 10 INJECTION, SOLUTION INTRAMUSCULAR; INTRAVENOUS at 11:29

## 2021-01-14 RX ADMIN — Medication 2 MG: at 13:24

## 2021-01-14 RX ADMIN — GLYCOPYRROLATE 0.2 MG: 0.2 INJECTION INTRAMUSCULAR; INTRAVENOUS at 11:07

## 2021-01-14 RX ADMIN — PROPOFOL 110 MG: 10 INJECTION, EMULSION INTRAVENOUS at 11:16

## 2021-01-14 RX ADMIN — ROCURONIUM BROMIDE 40 MG: 10 INJECTION, SOLUTION INTRAVENOUS at 11:17

## 2021-01-14 RX ADMIN — SODIUM CHLORIDE, SODIUM LACTATE, POTASSIUM CHLORIDE, AND CALCIUM CHLORIDE 125 ML/HR: 600; 310; 30; 20 INJECTION, SOLUTION INTRAVENOUS at 09:50

## 2021-01-14 RX ADMIN — HEPARIN SODIUM 5000 UNITS: 5000 INJECTION INTRAVENOUS; SUBCUTANEOUS at 10:06

## 2021-01-14 RX ADMIN — KETAMINE HYDROCHLORIDE 10 MG: 10 INJECTION, SOLUTION INTRAMUSCULAR; INTRAVENOUS at 12:20

## 2021-01-14 RX ADMIN — KETAMINE HYDROCHLORIDE 10 MG: 10 INJECTION, SOLUTION INTRAMUSCULAR; INTRAVENOUS at 13:22

## 2021-01-14 RX ADMIN — MIDAZOLAM 2 MG: 1 INJECTION INTRAMUSCULAR; INTRAVENOUS at 11:07

## 2021-01-14 RX ADMIN — SODIUM CHLORIDE, SODIUM LACTATE, POTASSIUM CHLORIDE, AND CALCIUM CHLORIDE 125 ML/HR: 600; 310; 30; 20 INJECTION, SOLUTION INTRAVENOUS at 14:14

## 2021-01-14 RX ADMIN — SODIUM CHLORIDE, SODIUM LACTATE, POTASSIUM CHLORIDE, AND CALCIUM CHLORIDE: 600; 310; 30; 20 INJECTION, SOLUTION INTRAVENOUS at 11:20

## 2021-01-14 RX ADMIN — HYDROMORPHONE HYDROCHLORIDE 0.5 MG: 2 INJECTION, SOLUTION INTRAMUSCULAR; INTRAVENOUS; SUBCUTANEOUS at 11:37

## 2021-01-14 RX ADMIN — ONDANSETRON HYDROCHLORIDE 4 MG: 2 INJECTION INTRAMUSCULAR; INTRAVENOUS at 11:36

## 2021-01-14 RX ADMIN — DEXAMETHASONE SODIUM PHOSPHATE 4 MG: 4 INJECTION, SOLUTION INTRAMUSCULAR; INTRAVENOUS at 13:21

## 2021-01-14 RX ADMIN — METOCLOPRAMIDE 10 MG: 5 INJECTION, SOLUTION INTRAMUSCULAR; INTRAVENOUS at 13:21

## 2021-01-14 RX ADMIN — ESMOLOL HYDROCHLORIDE 10 MG: 10 INJECTION, SOLUTION INTRAVENOUS at 11:50

## 2021-01-14 RX ADMIN — SODIUM CHLORIDE, SODIUM LACTATE, POTASSIUM CHLORIDE, AND CALCIUM CHLORIDE: 600; 310; 30; 20 INJECTION, SOLUTION INTRAVENOUS at 12:02

## 2021-01-14 RX ADMIN — FENTANYL CITRATE 100 MCG: 50 INJECTION, SOLUTION INTRAMUSCULAR; INTRAVENOUS at 11:16

## 2021-01-14 RX ADMIN — KETAMINE HYDROCHLORIDE 10 MG: 10 INJECTION, SOLUTION INTRAMUSCULAR; INTRAVENOUS at 13:17

## 2021-01-14 RX ADMIN — GLYCOPYRROLATE 0.2 MG: 0.2 INJECTION INTRAMUSCULAR; INTRAVENOUS at 13:24

## 2021-01-14 RX ADMIN — KETOROLAC TROMETHAMINE 15 MG: 15 INJECTION, SOLUTION INTRAMUSCULAR; INTRAVENOUS at 13:23

## 2021-01-14 NOTE — BRIEF OP NOTE
Brief Postoperative Note    Patient: Suleiman Seo  YOB: 1980  MRN: [id number]    Date of Procedure: 1/14/2021     Pre-Op Diagnosis: ENDOMETRIAL CANCER    Post-Op Diagnosis: Same as preoperative diagnosis. Procedure(s):  ROBOTIC TOTAL LAPAROSCOPIC HYSTERECTOMY, BILATERAL SALPINGO-OOPHORECTOMY, STAGING, CYSTOSCOPY, UPPER VAGINECTOMY, PERINEAL BIOPSIES    Surgeon(s):  Monico Damon MD    Surgical Assistant: Surg Asst-1: Jean HDZ    Anesthesia: General     Estimated Blood Loss (mL): 592    Complications: None    Specimens:   ID Type Source Tests Collected by Time Destination   1 : CERVIX, UTERUS, BILATERAL FALLOPIAN TUBES AND UPPER VAGINA  Frozen Section Uterus with Bilateral Fallopian Tubes  Monico Damon MD 1/14/2021 1223 Pathology   2 : PELVIC ADHESIONS Preservative Pelvis  Monico Damon MD 1/14/2021 1229 Pathology   3 : RIGHT PELVIC SIDEWALL PERITONEUM Preservative Pelvis  Monico Damon MD 1/14/2021 1229 Pathology   4 : Rectosigmoid Epiploicae Adhesion Preservative Uterus  Monico Damon MD 1/14/2021 1231 Pathology   5 : VAGINAL BIOPSY Preservative Vaginal  Monico Damon MD 1/14/2021 1233 Pathology   6 : RIGHT PELVIC LYMPH NODE Preservative Pelvis  Monico Damon MD 1/14/2021 1233 Pathology   7 : LEFT PELVIC LYMPH NODE Preservative Pelvis  Monico Damon MD 1/14/2021 1242 Pathology   8 : RIGHT PERIAORTIC LYMPH NODE Preservative Abdomen  Monico Damon MD 1/14/2021 1306 Pathology   9 : LEFT PERIAORTIC LYMPH NODE Preservative Omentum  Monico Damon MD 1/14/2021 1311 Pathology   1 : ABDOMINAL WASHINGS Fresh Abdomen  Monico Damon MD 1/14/2021 1137 Cytology        Implants: * No implants in log *    Drains:   Orogastric Tube 01/14/21 (Active)       Findings: tumor extending through posterior wall of cervix and upper vagina. Normal tubes/ovaries. Tumor also seen protruding from cervical os. Possible spread to pelvic sidewall peritoneum and some adhesions of ovary to rectosigmoid.  No appreciable lymphadenopathy    Electronically Signed by Sariah Gaspar MD on 1/14/2021 at 1:31 PM

## 2021-01-14 NOTE — H&P
[facility]YNECOLOG[facility] ONCOL[facility]Y SPECIAL[facility]  [address], [address], [address] [facility]  [address] [facility], [address] [place] [address]  [place], [address] [place]   [phone]  Sherry Duffy DO        Postoperative Office Note  Patient ID:  Name: Ty Garza  MRM:  [id number]  :   1980/40 y.o. Date:   2021     SUBJECTIVE:     This is a 36 y.o.  female who presents s/p Hysteroscopy, D and C, and cervical biopsy on 2020  Currently she has no problems with eating, bowel movements, voiding, or their wound  Appetite is poor. Eating a regular diet without difficulty. Urinating without difficulty. Bowel movements are regular. Patient having back pain with some abdominal pain.     Her pathology revealed       A: CERVIX, BIOPSY:   ADENOCARCINOMA, WELL-DIFFERENTIATED (PLEASE SEE COMMENT). NO CERVICAL TISSUE SEEN.   B: ENDOMETRIAL CURETTINGS, BIOPSY:   ADENOCARCINOMA, WELL-DIFFERENTIATED (PLEASE SEE COMMENT). Addendum Diagnosis   Endometrial adenocarcinoma, endometrioid type, FIGO grade 1 (please see comment).      Imaging  CT  10/31/2020  FINDINGS:     LOWER CHEST: Moderate size hiatal hernia.     LIVER, BILIARY: Liver is normal. No biliary dilation. Gallbladder is  unremarkable.     PANCREAS: Normal.     SPLEEN: Normal.     ADRENALS: Normal.     KIDNEYS: Normal.     LYMPH NODES: No enlarged lymph nodes.     GASTROINTESTINAL TRACT: No bowel dilation or wall thickening. Normal appendix.     PELVIC ORGANS: Endometrium is distended with heterogeneous attenuation. Some is  low attenuation however there are areas of high attenuation within this. This  extends down to the cervical region which is prominent and also heterogeneous. There is a complex cystic mass in the left adnexal region which is probably  ovarian. There appear to be several cysts within septations measuring 4.5 x 3.5  cm.  Right ovary is normal. There is some stranding of the fat posterior to the  cervix. Small amount of fluid adjacent to the right ovary.     VASCULATURE: Unremarkable.     BONES: No acute or aggressive osseous abnormalities identified.     OTHER: None.     _______________     IMPRESSION  IMPRESSION:     Uterus is abnormal with dilated endometrial canal measuring up to 7.8 cm  transversely. Heterogeneous material within this. Cervix is also prominent. Differential diagnosis includes hydrocodone process. This could be secondary to  cervical mass. 2. Enlarged left ovary with multiple cysts. Past Medical History:   Diagnosis Date    Cancer [facility]) 12/2020    Endometriosis Carcinoma    Chronic pain     lower back    Endometriosis     GERD (gastroesophageal reflux disease)     Seizures (HCC)     age 1 or 4 no episode since than     Past Surgical History:   Procedure Laterality Date    HX DILATION AND CURETTAGE  12/2020       Medications:                Current Outpatient Medications on File Prior to Visit   Medication Sig Dispense Refill    mv-min/iron/folic/calcium/vitK (WOMEN'S MULTIVITAMIN PO) Take  by mouth daily.        methocarbamoL (ROBAXIN) 500 mg tablet Take 500 mg by mouth nightly.        omeprazole (PriLOSEC OTC) 20 mg tablet Take 20 mg by mouth daily.        cyclobenzaprine (FLEXERIL) 10 mg tablet Take  by mouth three (3) times daily as needed for Muscle Spasm(s).        etodolac (LODINE) 500 mg tablet Take 500 mg by mouth two (2) times a day. Indications: pain        ondansetron (Zofran ODT) 4 mg disintegrating tablet Take 1 Tab by mouth every eight (8) hours as needed for Nausea (or vomiting.). Allow to dissolve on tongue 12 Tab 0      No current facility-administered medications on file prior to visit.          Allergies:                      Allergies   Allergen Reactions    Codeine Hives         OBJECTIVE:     Vitals:   Visit Vitals  /67 (BP 1 Location: Right arm, BP Patient Position: Sitting)   Pulse (!) 101   Temp (!) 96.4 °F (35.8 °C) (Temporal)   Ht 5' 7\" (1.702 m)   Wt 54.7 kg (120 lb 9.6 oz)   SpO2 100%   BMI 18.89 kg/m²         Physical Examination:     General:  alert, cooperative, no distress   Incision: N/A   Pelvic: deferred   Rectal: not done   Extremity:   extremities normal, atraumatic, no cyanosis or edema      IMPRESSION/PLAN:     Darron Gibbs is Doing well postoperatively. .  She has a working diagnosis of grade 1 endometrial cancer  1.  Grade 1 Endometrial Cancer              -reviewed her pathology and discussed concern for stage II disease.               -discussed recommendation for hysterectomy/bso, with staging              -discussed robotic approach              -briefly discussed possible  need for adjuvant treatment              -Risks, benefits and alternatives of surgery discussed in detail              -all of patients questions and concerns addressed           [address] [place] [address]  Gynecologic Oncology

## 2021-01-14 NOTE — PERIOP NOTES
Reviewed PTA medication list with patient/caregiver and patient/caregiver denies any additional medications. Patient admits to having a responsible adult care for them at home for at least 24 hours after surgery. Patient encouraged to use gown warming system and informed that using said warming gown to regulate body temperature prior to a procedure has been shown to help reduce the risks of blood clots and infection. Patient's pharmacy of choice verified and documented in PTA medication section. Dual skin assessment & fall risk band verification completed with Eliezer Chirinos RN. Urine pregnancy results negative and verified with Eliezer Chirinos RN.

## 2021-01-14 NOTE — DISCHARGE INSTRUCTIONS
Laparoscopy: What to Expect at [address] [place] [address]  After laparoscopic surgery, you are likely to have pain for the next several days. You may have a low fever and feel tired and sick to your stomach. This is common. You should feel better after 1 to 2 weeks. This care sheet gives you a general idea about how long it will take for you to recover. But each person recovers at a different pace. Follow the steps below to get better as quickly as possible. How can you care for yourself at home? Activity    · Rest when you feel tired. Getting enough sleep will help you recover.     · Try to walk each day. Start by walking a little more than you did the day before. Bit by bit, increase the amount you walk. Walking boosts blood flow and helps prevent pneumonia and constipation.     · Avoid strenuous activities, such as bicycle riding, jogging, weight lifting, or aerobic exercise, until your doctor says it is okay.     · Avoid lifting anything that would make you strain. This may include a child, heavy grocery bags and milk containers, a heavy briefcase or backpack, cat litter or dog food bags, or a vacuum .     · You may also have pain in your shoulder. The pain usually lasts about 1 or 2 days.     · You may drive when you are no longer taking pain medicine and can quickly move your foot from the gas pedal to the brake. You must also be able to sit comfortably for a long period of time, even if you do not plan to go far. You might get caught in traffic.     · You will probably need to take 2 weeks off from work. It depends on the type of work you do and how you feel.     · You may shower 24 to 48 hours after surgery, if your doctor okays it. Pat the cut (incision) dry. Do not take a bath for the first 2 weeks, or until your doctor tells you it is okay.    Diet    · If your stomach is upset, try bland, low-fat foods such as plain rice, broiled chicken, toast, and yogurt.     · Drink plenty of fluids (enough so that your urine is light yellow or clear like water) to prevent dehydration. Choose water and other caffeine-free clear liquids. If you have kidney, heart, or liver disease and have to limit fluids, talk with your doctor before you increase the amount of fluids you drink.     · You may notice that your bowel movements are not regular right after your surgery. This is common. Avoid constipation and straining with bowel movements. You may want to take a fiber supplement every day. If you have not had a bowel movement after a couple of days, ask your doctor about taking a mild laxative. Medicines    · Your doctor will tell you if and when you can restart your medicines. He or she will also give you instructions about taking any new medicines.     · If you take aspirin or some other blood thinner, ask your doctor if and when to start taking it again. Make sure that you understand exactly what your doctor wants you to do.     · Take pain medicines exactly as directed. ? If the doctor gave you a prescription medicine for pain, take it as prescribed. ? If you are not taking a prescription pain medicine, ask your doctor if you can take an over-the-counter medicine.     · If your doctor prescribed antibiotics, take them as directed. Do not stop taking them just because you feel better. You need to take the full course of antibiotics.     · If you think your pain medicine is making you sick to your stomach:  ? Take your medicine after meals (unless your doctor has told you not to). ? Ask your doctor for a different pain medicine. Incision care    · If you have strips of tape on the incision, leave the tape on for a week or until it falls off.     · Wash the area daily with warm, soapy water and pat it dry. Don't use hydrogen peroxide or alcohol, which can slow healing. You may cover the area with a gauze bandage if it weeps or rubs against clothing. Change the bandage every day.    Follow-up care is a key part of your treatment and safety. Be sure to make and go to all appointments, and call your doctor if you are having problems. It's also a good idea to know your test results and keep a list of the medicines you take. When should you call for help? Call 911 anytime you think you may need emergency care. For example, call if:    · You passed out (lost consciousness).     · You are short of breath. Call your doctor now or seek immediate medical care if:    · You have pain that does not get better after you take pain medicine.     · You have loose stitches, or your incision comes open.     · Bright red blood has soaked through your bandage.     · You have signs of infection, such as:  ? Increased pain, swelling, warmth, or redness. ? Red streaks leading from the incision. ? Pus draining from the incision. ? A fever.     · You are sick to your stomach or cannot keep fluids down.     · You have signs of a blood clot in your leg (called a deep vein thrombosis), such as:  ? Pain in your calf, back of the knee, thigh, or groin. ? Redness and swelling in your leg or groin.     · You cannot pass stools or gas. Watch closely for any changes in your health, and be sure to contact your doctor if you have any problems. Where can you learn more? Go to http://www.gray.com/  Enter G657 in the search box to learn more about \"Laparoscopy: What to Expect at Home. \"  Current as of: April 15, 2020               Content Version: 12.6  © 8934-0182 [employer]. Care instructions adapted under license by [employer] (which disclaims liability or warranty for this information). If you have questions about a medical condition or this instruction, always ask your healthcare professional. [place] [address] any warranty or liability for your use of this information.          DISCHARGE SUMMARY from Nurse    Increase fluids today  Advance diet as tolerated  Call Dr. Maria Guadalupe Oneal if you develop a fever of 101 or more, chills, uncontrollable nausea and vomiting, and\or uncontrolled pain  Call Dr. Prince Tompkins if you have excessive drainage from your incision sites on your abdomen, and\or if you saturate more than one maxipad in 2 consecutive hours. You may shower in 2 days  Do not scrub incisions  No ointments, lotions, or powders to incision sites. Do not submerge in bathtubs, hot tubs, or jacuzzis. PATIENT INSTRUCTIONS:    After general anesthesia or intravenous sedation, for 24 hours or while taking prescription Narcotics:  · Limit your activities  · Do not drive and operate hazardous machinery  · Do not make important personal or business decisions  · Do  not drink alcoholic beverages  · If you have not urinated within 8 hours after discharge, please contact your surgeon on call. Report the following to your surgeon:  · Excessive pain, swelling, redness or odor of or around the surgical area  · Temperature over 100.5  · Nausea and vomiting lasting longer than 4 hours or if unable to take medications  · Any signs of decreased circulation or nerve impairment to extremity: change in color, persistent  numbness, tingling, coldness or increase pain  · Any questions    What to do at Home:  LIGHT DIET TODAY ADVANCE AS TOLERATED  OK TO [address] 2 WEEKS  RETURN TO OFFICE IN 2 WEEKS CALL FOR APPT    If you experience any of the following symptoms heavy bleeding, fevers, severe pain, please follow up with dr Olamide Romero    *  Please give a list of your current medications to your Primary Care Provider. *  Please update this list whenever your medications are discontinued, doses are      changed, or new medications (including over-the-counter products) are added. *  Please carry medication information at all times in case of emergency situations.     These are general instructions for a healthy lifestyle:    No smoking/ No tobacco products/ Avoid exposure to second hand smoke  Surgeon General's Warning:  Quitting smoking now greatly reduces serious risk to your health. Obesity, smoking, and sedentary lifestyle greatly increases your risk for illness    A healthy diet, regular physical exercise & weight monitoring are important for maintaining a healthy lifestyle    You may be retaining fluid if you have a history of heart failure or if you experience any of the following symptoms:  Weight gain of 3 pounds or more overnight or 5 pounds in a week, increased swelling in our hands or feet or shortness of breath while lying flat in bed. Please call your doctor as soon as you notice any of these symptoms; do not wait until your next office visit. Patient armband removed and shredded         10 Things to Do When You Have COVID-19    Stay home. Don't go to school, work, or public areas. And don't use public transportation, ride-shares, or taxis unless you have no choice. Leave your home only if you need to get medical care. But call the doctor's office first so they know you're coming. And wear a cloth face cover. Ask before leaving isolation. Talk with your doctor or other health professional about when it will be safe for you to leave isolation. Wear a cloth face cover when you are around other people. It can help stop the spread of the virus when you cough or sneeze. Limit contact with people in your home. If possible, stay in a separate bedroom and use a separate bathroom. Avoid contact with pets and other animals. If possible, have a friend or family member care for them while you're sick. Cover your mouth and nose with a tissue when you cough or sneeze. Then throw the tissue in the trash right away. Wash your hands often, especially after you cough or sneeze. Use soap and water, and scrub for at least 20 seconds. If soap and water aren't available, use an alcohol-based hand . Don't share personal household items.   These include bedding, towels, cups and glasses, and eating utensils. Clean and disinfect your home every day. Use household  or disinfectant wipes or sprays. Take special care to clean things that you grab with your hands. These include doorknobs, remote controls, phones, and handles on your refrigerator and microwave. And don't forget countertops, tabletops, bathrooms, and computer keyboards. Take acetaminophen (Tylenol) to relieve fever and body aches. Read and follow all instructions on the label. Current as of: July 10, 2020               Content Version: 12.6  © 0315-1358 [employer]. Care instructions adapted under license by [employer] (which disclaims liability or warranty for this information). If you have questions about a medical condition or this instruction, always ask your healthcare professional. [address] any warranty or liability for your use of this information.

## 2021-01-14 NOTE — ANESTHESIA POSTPROCEDURE EVALUATION
Post-Anesthesia Evaluation and Assessment    Cardiovascular Function/Vital Signs  Visit Vitals  /78   Pulse 91   Temp 36.6 °C (97.9 °F)   Resp 17   Ht 5' 7\" (1.702 m)   Wt 53.6 kg (118 lb 4 oz)   SpO2 100%   BMI 18.52 kg/m²       Patient is status post Procedure(s):  ROBOTIC TOTAL LAPAROSCOPIC HYSTERECTOMY, BILATERAL SALPINGO-OOPHORECTOMY, STAGING, CYSTOSCOPY, UPPER VAGINECTOMY, PERINEAL BIOPSIES. Nausea/Vomiting: Controlled. Postoperative hydration reviewed and adequate. Pain:  Pain Scale 1: Numeric (0 - 10) (01/14/21 1445)  Pain Intensity 1: 0 (01/14/21 1445)   Managed. Neurological Status:   Neuro (WDL): Exceptions to WDL (01/14/21 1350)   At baseline. Mental Status and Level of Consciousness: Arousable. Pulmonary Status:   O2 Device: Nasal cannula (01/14/21 1445)   Adequate oxygenation and airway patent. Complications related to anesthesia: None    Post-anesthesia assessment completed. No concerns. Patient has met all discharge requirements. Signed By: Ankit Krishnamurthy MD    January 14, 2021               Procedure(s):  ROBOTIC TOTAL LAPAROSCOPIC HYSTERECTOMY, BILATERAL SALPINGO-OOPHORECTOMY, STAGING, CYSTOSCOPY, UPPER VAGINECTOMY, PERINEAL BIOPSIES. general    <BSHSIANPOST>    INITIAL Post-op Vital signs:   Vitals Value Taken Time   /78 01/14/21 1440   Temp 36.6 °C (97.9 °F) 01/14/21 1445   Pulse 84 01/14/21 1444   Resp 15 01/14/21 1444   SpO2 100 % 01/14/21 1444   Vitals shown include unvalidated device data.

## 2021-01-14 NOTE — PERIOP NOTES
1530: Talked with Dr. Genevieve Alanis on the phone regarding patient nausea/vomiting. Received order for SCOPOLAMINE patch. 1545: Dr. Genevieve Alanis at bedside regarding nausea and vomiting. Received orders for ZOFRAN 4 mg.

## 2021-01-14 NOTE — ANESTHESIA PREPROCEDURE EVALUATION
Relevant Problems   No relevant active problems       Anesthetic History   No history of anesthetic complications            Review of Systems / Medical History  Patient summary reviewed, nursing notes reviewed and pertinent labs reviewed    Pulmonary  Within defined limits                 Neuro/Psych     seizures        Comments: Sz age 1 Cardiovascular  Within defined limits                Exercise tolerance: >4 METS     GI/Hepatic/Renal  Within defined limits   GERD: well controlled           Endo/Other  Within defined limits           Other Findings              Physical Exam    Airway  Mallampati: III  TM Distance: 4 - 6 cm  Neck ROM: normal range of motion   Mouth opening: Normal     Cardiovascular               Dental  No notable dental hx       Pulmonary                 Abdominal         Other Findings            Anesthetic Plan    ASA: 2  Anesthesia type: general          Induction: Intravenous  Anesthetic plan and risks discussed with: Patient

## 2021-01-14 NOTE — INTERVAL H&P NOTE
Update History & Physical 
 
The Patient's History and Physical of January 5 
,  
 was reviewed with the patient and I examined the patient. There was no change. The surgical site was confirmed by the patient and me. Plan:  The risk, benefits, expected outcome, and alternative to the recommended procedure have been discussed with the patient. Patient understands and wants to proceed with the procedure.  
 
Electronically signed by Lelo Powell MD on 1/14/2021 at 10:33 AM

## 2021-01-15 ENCOUNTER — DOCUMENTATION ONLY (OUTPATIENT)
Dept: ONCOLOGY | Age: 41
End: 2021-01-15

## 2021-01-15 NOTE — OP NOTES
[facility]  OPERATIVE REPORT    Name:  Aurora Hill  MR#:   [id number]  :  1980  ACCOUNT #:  [de-identified]  DATE OF SERVICE:  2021    PREOPERATIVE DIAGNOSIS:  Grade I endometrial cancer. POSTOPERATIVE DIAGNOSIS:  Grade I endometrial cancer. PROCEDURES PERFORMED:  Robotic-assisted total laparoscopic hysterectomy, bilateral salpingo-oophorectomy, upper vaginectomy; bilateral pelvic and para-aortic lymph node dissection; peritoneal biopsies and washings; cystoscopy. SURGEON:  Kirk Claudio DO    ASSISTANT:  Bhavana Motta. ASSISTANT TASKS:  Retraction, uterine manipulation, and closing. ANESTHESIA:  General.    FLUIDS:  2000 mL of crystalloid. URINE OUTPUT:  100 mL of clear urine. COMPLICATIONS:  None. SPECIMENS REMOVED:  Uterus, cervix, tubes, ovaries, bilateral pelvic and para-aortic lymph nodes. Peritoneal biopsies    IMPLANTS:  None. ESTIMATED BLOOD LOSS:  100 mL. FINDINGS:  Exam under anesthesia revealed tumor protruding from the cervical os. Laparoscopically, there was tumor that had invaded through the posterior wall of the cervix and vagina, extending up to the right uterosacral ligament. There were also some adhesions of the bladder anteriorly, concerning that there might have been some disease anteriorly. Frozen section of the vaginal margin was negative, and there was no appreciable lymphadenopathy. INDICATION:  The patient is a 70-year-old who had an MRI for lower back pain, showed an endometrial canal expanded with heterogeneous  material.  She then was seen in the emergency room and a CT revealed a cervical mass, left adnexal mass. She was then seen by GYN and an ultrasound revealing an irregular mass in the endometrium and an irregular-shaped cervix.   She was taken to the operating room for an exam under anesthesia, where D and C and cervical biopsy were all consistent with a grade I endometrioid adenocarcinoma, consistent with an endometrial primary. She presents today for surgical evaluation. PROCEDURE:  The patient was taken to the operating room where general anesthesia was obtained without difficulty. She was placed in dorsal lithotomy position, prepped and draped in normal sterile fashion. A surgical time-out was taken by the entire surgical team.  A Moseley catheter was placed. A sterile speculum was then placed in the patient's vagina. The anterior lip of the cervix was grasped with a single-tooth tenaculum. The cervix was then dilated and a 6-cm CORI tip with a 3.5-cm Koh cup was advanced without difficulty. Attention was then turned to the abdomen, where a Veress was introduced in the left upper quadrant in the midclavicular line, and pneumoperitoneum was obtained to 15 mmHg. An incision was then made approximately 2 to 3 cm above the umbilicus in the midline, and an 8-mm robotic trocar was advanced. Laparoscope was introduced. There was no evidence of injury from entry. The patient was placed in Trendelenburg, with findings above. At this point, two 8-mm robotic trocars were placed on the left side of the abdomen under direct visualization and one 8-mm robotic trocar was placed on the right side of the abdomen under direct visualization. A 5 mm trocar was placed in the right upper quadrant. Washings were then obtained. The robot was undocked at the console. Attention was turned to the right side, where the right round ligament was divided using monopolar cautery. This peritoneal incision was extended superiorly along the right IP ligament. The right retroperitoneal space was opened. The ureter was identified. A window in the peritoneum overlying the ureter was made, and the right IP ligament was skeletonized, sealed and divided. The adnexa was elevated. At this point, it became clear that the tumor had invaded through the cervix along the right uterosacral ligament.   Attention was then turned to left side, where this was repeated in the same fashion without difficulty. Next, the uterus was retroverted and the anterior vesicouterine peritoneum appeared somewhat adhered to the anterior uterus. This was carefully dissected free, and a bladder flap created with blunt and sharp dissection. Next, attention was turned to the right side and in order to get below the tumor, the rectovaginal space was developed and the rectum was dissected inferiorly. The attention was then turned to the right retroperitoneal space, where the ureter was identified and ureterolysis was performed to adequately dissect the ureter away from the pelvic sidewall peritoneum, down to the level of the ureteral tunnel, to ensure that it was lateral to our remaining dissection. Once this was accomplished, the uterosacrals were ligated approximately CHCF back to their origin, which allowed us to retract them anteriorly with the tumor and have access to the vagina. This was repeated on both sides. Once this was accomplished using both bipolar and monopolar cautery, attention was turned anteriorly where anterior colpotomy was made, and this was initially carried circumferentially around the Koh cup. Once we were posterior, using bipolar and monopolar cautery, a portion of the upper vagina was removed to ensure a clear margin, and the specimen was placed into an Endo Catch bag, removed and sent for frozen section as above. The additional dissection, including ureterolysis and radical dissection of the uterosacral ligaments added an additional 60 minutes that would have not been needed for this procedure. Once this was accomplished, any remaining portions of tumor that had come free were placed in an Endo Catch bag, removed, and sent for permanent specimen. At this point, the right-sided pelvic peritoneum was then dissected free from its attachment to the ureter and resected and sent for permanent specimen.   There was a small area of concern in the rectosigmoid epiploica, which was dissected free and sent for permanent specimen. An area within the posterior wall of the vagina was then also biopsied and sent for permanent specimen. At this point, attention was turned to the right-sided pelvic lymph node dissection, where the round ligament was elevated and the underlying lymphatic bundle was dissected free from the psoas muscle medially over the external iliac artery and vein, from the circumflex iliac vein to the bifurcation. The right paravesical space was developed, the obturator nerve was identified. The obturator bundle was then dissected from the pelvic sidewall, cephalad along the obturator nerve to the bifurcation. These were then placed in an Endo Catch bag, removed and sent for permanent specimen. This was then repeated on the left side in the same fashion with the same borders, and there was good hemostasis. Attention was then turned to the para-aortic dissection, where the peritoneum overlying the ureter was elevated and incised. This peritoneal incision was extended inferiorly along the right common iliac artery. The peritoneum was then elevated and retracted laterally with the ureter. The lymphatic bundle was then dissected free off the mid right common iliac artery up along the aorta and inferior vena cava to the inferior mesenteric artery. These were then placed in an Endo Catch bag and removed and sent for permanent specimen. The rectosigmoid was then retracted medially and the left para-aortic lymph nodes were identified. The ureter was seen peristalsing laterally, and the left para-aortic lymph nodes were dissected free from their attachments and removed and sent for permanent specimen. At this point, the pelvis was irrigated copiously and there was good hemostasis. The vaginal cuff was closed with 2-0 PDS in a running fashion and Aniceto powder was placed into all the resection beds, which offered good hemostasis.     At this point, all instruments were removed. The robot was undocked. All trocars were removed and pneumoperitoneum was relieved. Cystoscopy was then performed, with no evidence of injury to the bladder and bilateral ureteral jets were seen. At this point, all skin sites were closed with 4-0 Monocryl and Exparel injected at the conclusion. The patient tolerated the procedure well. Sponge count correct x2, and the patient taken to recovery room in stable condition.       [address], DO      [id number]  D:  01/14/2021 13:41  T:  01/14/2021 20:18  JOB #:  [id number]

## 2021-01-22 DIAGNOSIS — R10.2 PELVIC PAIN: ICD-10-CM

## 2021-01-22 RX ORDER — OXYCODONE AND ACETAMINOPHEN 5; 325 MG/1; MG/1
TABLET ORAL
Qty: 15 TAB | Refills: 0 | Status: SHIPPED | OUTPATIENT
Start: 2021-01-22 | End: 2021-02-01 | Stop reason: SDUPTHER

## 2021-01-28 NOTE — PROGRESS NOTES
[facility]  [address], [address], [address] [facility]  [address]  [place],  [place]   [phone]  Radhakendallmario Anglin DO      Postoperative Office Note  Patient ID:  Name: Ester Dougherty  MRM: [id number]  : 1980/40 y.o. Date: 2021    SUBJECTIVE:    This is a 36 y.o.  female who presents s/p Robotic-assisted total laparoscopic hysterectomy, bilateral salpingo-oophorectomy, upper vaginectomy; bilateral pelvic and para-aortic lymph node dissection; peritoneal biopsies and washings; cystoscopy. on 2021    Appetite is poor. Eating a regular diet without difficulty. Urinating without difficulty. Bowel movements are regular. Pain is controlled with current analgesics. Medication(s) being used: narcotic analgesics including oxycodone/acetaminophen (Percocet, Roxicet, Tylox). .    Her pathology revealed      A: CERVIX, UTERUS, BILATERAL FALLOPIAN TUBES, AND UPPER VAGINA, TOTAL HYSTERECTOMY WITH BILATERAL SALPINGO-OOPHORECTOMY AND PARTIAL VAGINECTOMY:   ENDOMETRIAL ENDOMETRIOID ADENOCARCINOMA, FIGO GRADE 1, WITH EXTENSIVE INVASION INTO THE CERVIX AND UPPER VAGINA (PLEASE SEE COMMENTS AND SYNOPTIC REPORT). SURGICAL MARGINS NEGATIVE. BENIGN BILATERAL FALLOPIAN TUBES AND OVARIES. B: PELVIC ADHESIONS, BIOPSY:   BENIGN FIBROUS TISSUE. NEGATIVE FOR MALIGNANCY. C: RIGHT PELVIC SIDEWALL PERITONEUM, BIOPSY:   ADENOCARCINOMA. D: RECTOSIGMOID EPIPLOICAE ADHESION, BIOPSY:   ADENOCARCINOMA. E: VAGINA, BIOPSY:   ACUTE INFLAMMATORY TISSUE. NO CARCINOMA SEEN. F: RIGHT PELVIC LYMPH NODE, DISSECTION:   THIRTEEN (13) BENIGN LYMPH NODES. G: LEFT PELVIC LYMPH NODE, DISSECTION:   EIGHT (8) BENIGN LYMPH NODES. H: RIGHT PARA-AORTIC LYMPH NODE, EXCISION:   ONE (1) BENIGN LYMPH NODE. I: LEFT PARA-AORTIC LYMPH NODE, EXCISION:   BENIGN FIBROADIPOSE TISSUE. NO LYMPHOID TISSUE SEEN.    DIAGNOSIS COMMENT:   The intraoperative consultation (specimen A) is corroborated. Immunohistochemistry results for DNA mismatch repair proteins will be reported as an addendum. ENDOMETRIUM   SPECIMEN   Procedure: Total hysterectomy and bilateral salpingo-oophorectomy   TUMOR   Histologic Type: Endometrioid carcinoma, NOS   Histologic Grade: FIGO grade 1   Myometrial Invasion: Present   Depth of Myometrial Invasion (Millimeters): 6 mm   Myometrial Thickness (Millimeters): 28 mm   Percentage of Myometrial Invasion: 21 %   Uterine Serosa Involvement: Not identified   Lower Uterine Segment Involvement: Present, myoinvasive   Cervical Stromal Involvement: Present   Other Tissue / Organ Involvement: Upper Vagina, Pelvic wall, Rectosigmoid adhesion   Peritoneal Ascitic Fluid: Atypical: favor reactive   Lymphovascular Invasion: Not identified   MARGINS   Margins: Ectocervical / Vaginal Cuff Margin: Uninvolved by carcinoma   LYMPH NODES   Lymph Node Status: All lymph nodes negative for tumor cells   Total Number of Pelvic Nodes Examined: 21   Total Number of Para-aortic Nodes Examined: 1   PATHOLOGIC STAGE CLASSIFICATION (pTNM, AJCC 8th Edition)   Primary Tumor (pT): pT3b   Regional Lymph Nodes (pN): pN0   Distant Metastasis (pM): pM1   Specify Site(s): Right Pelvic sidewall peritoneum   GROSS DESCRIPTION:   The specimen is received in nine parts.    TEST(S) PERFORMED   Test(s) Performed: Mismatch Repair   Immunohistochemistry (IHC) Testing for Mismatch Repair (MMR)   Proteins: MLH1, MSH2, MSH6, PMS2   MLH1: Intact nuclear expression   MSH2: Loss of nuclear expression   MSH6: Loss of nuclear expression   PMS2: Intact nuclear expression     Medications:     Current Outpatient Medications on File Prior to Visit   Medication Sig Dispense Refill    oxyCODONE-acetaminophen (PERCOCET) 5-325 mg per tablet take 1 tablet by mouth every 4 hours if needed for pain for up to 14 days MAX DAILY AMOUNT 6 TABLETS 15 Tab 0    ondansetron hcl (Zofran) 4 mg tablet Take 1 Tab by mouth every six (6) hours. 30 Tab 0   • omeprazole (PriLOSEC OTC) 20 mg tablet Take 20 mg by mouth daily.     • mv-min/iron/folic/calcium/vitK (WOMEN'S MULTIVITAMIN PO) Take  by mouth daily.     • cyclobenzaprine (FLEXERIL) 10 mg tablet Take  by mouth three (3) times daily as needed for Muscle Spasm(s).       No current facility-administered medications on file prior to visit.        Allergies:     Allergies   Allergen Reactions   • Codeine Hives       OBJECTIVE:    Vitals:   Visit Vitals  /63 (BP 1 Location: Right upper arm, BP Patient Position: Sitting)   Pulse 97   Temp 96.9 °F (36.1 °C) (Temporal)   Ht 5' 7\" (1.702 m)   Wt 54.4 kg (120 lb)   LMP 01/09/2021 (Approximate)   SpO2 100%   BMI 18.79 kg/m²       Physical Examination:    General:  alert, cooperative, no distress   Abdomen: soft, bowel sounds active, non-tender   Incision: healing well   Pelvic: deferred   Rectal: not done   Extremity:   extremities normal, atraumatic, no cyanosis or edema     IMPRESSION/PLAN:    Rajni Enrique is Doing well postoperatively..  She has a working diagnosis of Stage IIIBG1 endometrial cancer  The operative procedures and clinical results have been reviewed with the patient.  Implications of diagnosis discussed at length.  All questions answered.   Discussed recommendation for adjuvant chemotherapy  Will need mediport and chemo teaching  Plan for carbo auc=6, taxol 175 mg/m2 IV every 3 wks x 6 cycles  Will send for CARis and will need genetic testing  Decreased appetite- megace sent to pharmacy  Briefly discussed anticipated side effects  I will see the patient back in 2 week(s). The patient is advised to call our office with any problems or concerns.    Pedro Cerna DO  Gynecologic Oncology  1/29/2021/12:59 PM

## 2021-01-29 ENCOUNTER — OFFICE VISIT (OUTPATIENT)
Dept: ONCOLOGY | Age: 41
End: 2021-01-29
Payer: COMMERCIAL

## 2021-01-29 VITALS
HEIGHT: 67 IN | DIASTOLIC BLOOD PRESSURE: 63 MMHG | WEIGHT: 120 LBS | HEART RATE: 97 BPM | TEMPERATURE: 96.9 F | OXYGEN SATURATION: 100 % | SYSTOLIC BLOOD PRESSURE: 106 MMHG | BODY MASS INDEX: 18.83 KG/M2

## 2021-01-29 DIAGNOSIS — C54.1 ENDOMETRIAL CANCER (HCC): Primary | ICD-10-CM

## 2021-01-29 DIAGNOSIS — C54.1 ENDOMETRIAL CANCER (HCC): ICD-10-CM

## 2021-01-29 PROCEDURE — 99024 POSTOP FOLLOW-UP VISIT: CPT | Performed by: OBSTETRICS & GYNECOLOGY

## 2021-01-29 RX ORDER — MEGESTROL ACETATE 40 MG/ML
400 SUSPENSION ORAL 2 TIMES DAILY
Qty: 1 BOTTLE | Refills: 3 | Status: SHIPPED | OUTPATIENT
Start: 2021-01-29

## 2021-01-29 NOTE — LETTER
NOTIFICATION RETURN TO WORK / SCHOOL 
 
1/29/2021 9:47 AM 
 
Ms[facility] [address] [facility] To Whom It May Concern: 
 
[facility] is currently under the care of Goldy Funk. Mrs. Carmen Jo was seen today. She is currently still recovering from her surgery and will need to be out of work approximately additional 2 weeks. If there are questions or concerns please have the patient contact our office. Sincerely, 
 
 
Dr. Sherry Duffy

## 2021-01-29 NOTE — PROGRESS NOTES
Yunior Zaragoza is a 36 y.o. female presents in office for 2 week surgical follow up. Chief Complaint   Patient presents with    Surgical Follow-up     2wks      Do you have any unusual vaginal bleeding, discharge or irritation? No  Do you have any changes in your bowel movements? no  Have you been experiencing nausea or vomiting? No  Have you been experiencing any continuous or worsening abdominal pain? no  Any urinary burning? no    Visit Vitals  /63 (BP 1 Location: Right upper arm, BP Patient Position: Sitting)   Pulse 97   Temp 96.9 °F (36.1 °C) (Temporal)   Ht 5' 7\" (1.702 m)   Wt 120 lb (54.4 kg)   SpO2 100%   BMI 18.79 kg/m²         1. Have you been to the ER, urgent care clinic since your last visit? Hospitalized since your last visit? No    2. Have you seen or consulted any other health care providers outside of the [address] [place] [address] since your last visit? Include any pap smears or colon screening. No    3 most recent PHQ Screens 1/29/2021   Little interest or pleasure in doing things Not at all   Feeling down, depressed, irritable, or hopeless Not at all   Total Score PHQ 2 0       No flowsheet data found. No flowsheet data found. No flowsheet data found.

## 2021-02-01 DIAGNOSIS — R10.2 PELVIC PAIN: ICD-10-CM

## 2021-02-01 RX ORDER — OXYCODONE AND ACETAMINOPHEN 5; 325 MG/1; MG/1
1 TABLET ORAL
Qty: 15 TAB | Refills: 0 | Status: SHIPPED | OUTPATIENT
Start: 2021-02-01 | End: 2021-02-08 | Stop reason: SDUPTHER

## 2021-02-01 NOTE — PROGRESS NOTES
PT aware of NPO status. NPO after MN night prior to procedure. PT aware of need to hold anticoagulants per protocol. Pt denies. PT aware of potential for sedation administration and need for  at discharge. PT aware of arrival time pre procedure. Arrive at THE [facility] radiology waiting room on 2/8/21 at 0900 for scheduled procedure to occur at 1030. Pt states no questions at this time. Gave pt [facility] radiology rn phone number [phone].

## 2021-02-02 RX ORDER — OXYCODONE AND ACETAMINOPHEN 5; 325 MG/1; MG/1
TABLET ORAL
Qty: 15 TAB | OUTPATIENT
Start: 2021-02-02 | End: 2021-02-09

## 2021-02-02 NOTE — TELEPHONE ENCOUNTER
Spoke with patient to schedule chemo teaching with 4 week post op visit. Pt requests to start treatment after Irizarry's Day, will arrange for start date of 2/17/21.

## 2021-02-04 PROBLEM — C54.1 ENDOMETRIAL CANCER (HCC): Status: ACTIVE | Noted: 2021-02-04

## 2021-02-04 RX ORDER — HYDROCORTISONE SODIUM SUCCINATE 100 MG/2ML
100 INJECTION, POWDER, FOR SOLUTION INTRAMUSCULAR; INTRAVENOUS AS NEEDED
Status: CANCELLED | OUTPATIENT
Start: 2021-02-17

## 2021-02-04 RX ORDER — POTASSIUM CHLORIDE 7.45 MG/ML
10 INJECTION INTRAVENOUS
Status: CANCELLED
Start: 2021-02-17

## 2021-02-04 RX ORDER — PROCHLORPERAZINE EDISYLATE 5 MG/ML
10 INJECTION INTRAMUSCULAR; INTRAVENOUS
Status: CANCELLED
Start: 2021-02-17

## 2021-02-04 RX ORDER — ONDANSETRON 2 MG/ML
8 INJECTION INTRAMUSCULAR; INTRAVENOUS AS NEEDED
Status: CANCELLED | OUTPATIENT
Start: 2021-02-17

## 2021-02-04 RX ORDER — SODIUM CHLORIDE 9 MG/ML
10 INJECTION INTRAMUSCULAR; INTRAVENOUS; SUBCUTANEOUS AS NEEDED
Status: CANCELLED | OUTPATIENT
Start: 2021-02-17

## 2021-02-04 RX ORDER — DIPHENHYDRAMINE HYDROCHLORIDE 50 MG/ML
50 INJECTION, SOLUTION INTRAMUSCULAR; INTRAVENOUS ONCE
Status: CANCELLED | OUTPATIENT
Start: 2021-02-17 | End: 2021-02-17

## 2021-02-04 RX ORDER — DIPHENHYDRAMINE HYDROCHLORIDE 50 MG/ML
25 INJECTION, SOLUTION INTRAMUSCULAR; INTRAVENOUS AS NEEDED
Status: CANCELLED
Start: 2021-02-17

## 2021-02-04 RX ORDER — LORAZEPAM 2 MG/ML
0.26 INJECTION INTRAMUSCULAR ONCE
Status: CANCELLED
Start: 2021-02-17 | End: 2021-02-17

## 2021-02-04 RX ORDER — SODIUM CHLORIDE 0.9 % (FLUSH) 0.9 %
10 SYRINGE (ML) INJECTION AS NEEDED
Status: CANCELLED | OUTPATIENT
Start: 2021-02-17

## 2021-02-04 RX ORDER — DIPHENHYDRAMINE HYDROCHLORIDE 50 MG/ML
50 INJECTION, SOLUTION INTRAMUSCULAR; INTRAVENOUS AS NEEDED
Status: CANCELLED
Start: 2021-02-17

## 2021-02-04 RX ORDER — ACETAMINOPHEN 325 MG/1
650 TABLET ORAL AS NEEDED
Status: CANCELLED
Start: 2021-02-17

## 2021-02-04 RX ORDER — EPINEPHRINE 1 MG/ML
0.3 INJECTION, SOLUTION, CONCENTRATE INTRAVENOUS AS NEEDED
Status: CANCELLED | OUTPATIENT
Start: 2021-02-17

## 2021-02-04 RX ORDER — HEPARIN 100 UNIT/ML
300-500 SYRINGE INTRAVENOUS AS NEEDED
Status: CANCELLED
Start: 2021-02-17

## 2021-02-04 RX ORDER — SODIUM CHLORIDE 9 MG/ML
25 INJECTION, SOLUTION INTRAVENOUS CONTINUOUS
Status: CANCELLED | OUTPATIENT
Start: 2021-02-17

## 2021-02-04 RX ORDER — ALBUTEROL SULFATE 0.83 MG/ML
2.5 SOLUTION RESPIRATORY (INHALATION) AS NEEDED
Status: CANCELLED
Start: 2021-02-17

## 2021-02-08 ENCOUNTER — HOSPITAL ENCOUNTER (OUTPATIENT)
Dept: INTERVENTIONAL RADIOLOGY/VASCULAR | Age: 41
Discharge: HOME | End: 2021-02-08
Attending: OBSTETRICS & GYNECOLOGY | Admitting: OBSTETRICS & GYNECOLOGY
Payer: COMMERCIAL

## 2021-02-08 ENCOUNTER — HOSPITAL ENCOUNTER (OUTPATIENT)
Dept: LAB | Age: 41
Discharge: HOME | End: 2021-02-08

## 2021-02-08 ENCOUNTER — TELEPHONE (OUTPATIENT)
Dept: ONCOLOGY | Age: 41
End: 2021-02-08

## 2021-02-08 VITALS
DIASTOLIC BLOOD PRESSURE: 68 MMHG | SYSTOLIC BLOOD PRESSURE: 115 MMHG | OXYGEN SATURATION: 100 % | BODY MASS INDEX: 19.37 KG/M2 | WEIGHT: 123.4 LBS | TEMPERATURE: 98.9 F | HEIGHT: 67 IN | HEART RATE: 99 BPM | RESPIRATION RATE: 20 BRPM

## 2021-02-08 DIAGNOSIS — R10.2 PELVIC PAIN: ICD-10-CM

## 2021-02-08 LAB
ALBUMIN SERPL-MCNC: 3.9 G/DL (ref 3.4–5)
ALBUMIN/GLOB SERPL: 1.1 {RATIO} (ref 0.8–1.7)
ALP SERPL-CCNC: 63 U/L (ref 45–117)
ALT SERPL-CCNC: 14 U/L (ref 13–56)
ANION GAP SERPL CALC-SCNC: 7 MMOL/L (ref 3–18)
APPEARANCE UR: NORMAL
APTT PPP: 22.7 SEC (ref 23–36.4)
AST SERPL-CCNC: 4 U/L (ref 10–38)
BASOPHILS # BLD: 0.1 K/UL (ref 0–0.1)
BASOPHILS NFR BLD: 1 % (ref 0–2)
BILIRUB SERPL-MCNC: 0.4 MG/DL (ref 0.2–1)
BILIRUB UR QL: NEGATIVE
BUN SERPL-MCNC: 12 MG/DL (ref 7–18)
BUN/CREAT SERPL: 16 (ref 12–20)
CALCIUM SERPL-MCNC: 10.1 MG/DL (ref 8.5–10.1)
CHLORIDE SERPL-SCNC: 109 MMOL/L (ref 100–111)
CO2 SERPL-SCNC: 27 MMOL/L (ref 21–32)
COLOR UR: YELLOW
CREAT SERPL-MCNC: 0.74 MG/DL (ref 0.6–1.3)
DIFFERENTIAL METHOD BLD: ABNORMAL
EOSINOPHIL # BLD: 0.1 K/UL (ref 0–0.4)
EOSINOPHIL NFR BLD: 1 % (ref 0–5)
ERYTHROCYTE [DISTWIDTH] IN BLOOD BY AUTOMATED COUNT: 16.8 % (ref 11.6–14.5)
GLOBULIN SER CALC-MCNC: 3.5 G/DL (ref 2–4)
GLUCOSE SERPL-MCNC: 94 MG/DL (ref 74–99)
GLUCOSE UR STRIP.AUTO-MCNC: NEGATIVE MG/DL
HCT VFR BLD AUTO: 31.4 % (ref 35–45)
HGB BLD-MCNC: 8.5 G/DL (ref 12–16)
HGB UR QL STRIP: NEGATIVE
INR PPP: 1.1 (ref 0.8–1.2)
KETONES UR QL STRIP.AUTO: NEGATIVE MG/DL
LEUKOCYTE ESTERASE UR QL STRIP.AUTO: NEGATIVE
LYMPHOCYTES # BLD: 2.4 K/UL (ref 0.9–3.6)
LYMPHOCYTES NFR BLD: 22 % (ref 21–52)
MAGNESIUM SERPL-MCNC: 2.5 MG/DL (ref 1.6–2.6)
MCH RBC QN AUTO: 19.6 PG (ref 24–34)
MCHC RBC AUTO-ENTMCNC: 27.1 G/DL (ref 31–37)
MCV RBC AUTO: 72.5 FL (ref 74–97)
MONOCYTES # BLD: 1 K/UL (ref 0.05–1.2)
MONOCYTES NFR BLD: 9 % (ref 3–10)
NEUTS SEG # BLD: 7.4 K/UL (ref 1.8–8)
NEUTS SEG NFR BLD: 67 % (ref 40–73)
NITRITE UR QL STRIP.AUTO: NEGATIVE
PH UR STRIP: 6.5 [PH] (ref 5–8)
PLATELET # BLD AUTO: 422 K/UL (ref 135–420)
PLATELET COMMENTS,PCOM: ABNORMAL
PMV BLD AUTO: 11.3 FL (ref 9.2–11.8)
POTASSIUM SERPL-SCNC: 4.3 MMOL/L (ref 3.5–5.5)
PROT SERPL-MCNC: 7.4 G/DL (ref 6.4–8.2)
PROT UR STRIP-MCNC: NEGATIVE MG/DL
PROTHROMBIN TIME: 14.4 SEC (ref 11.5–15.2)
RBC # BLD AUTO: 4.33 M/UL (ref 4.2–5.3)
RBC MORPH BLD: ABNORMAL
SODIUM SERPL-SCNC: 143 MMOL/L (ref 136–145)
SP GR UR REFRACTOMETRY: 1.01 (ref 1–1.03)
UROBILINOGEN UR QL STRIP.AUTO: 1 EU/DL (ref 0.2–1)
WBC # BLD AUTO: 11 K/UL (ref 4.6–13.2)

## 2021-02-08 PROCEDURE — 81003 URINALYSIS AUTO W/O SCOPE: CPT

## 2021-02-08 PROCEDURE — 77001 FLUOROGUIDE FOR VEIN DEVICE: CPT

## 2021-02-08 PROCEDURE — 99153 MOD SED SAME PHYS/QHP EA: CPT

## 2021-02-08 PROCEDURE — 86304 IMMUNOASSAY TUMOR CA 125: CPT

## 2021-02-08 PROCEDURE — 85610 PROTHROMBIN TIME: CPT

## 2021-02-08 PROCEDURE — 74011250636 HC RX REV CODE- 250/636: Performed by: RADIOLOGY

## 2021-02-08 PROCEDURE — 74011000250 HC RX REV CODE- 250: Performed by: RADIOLOGY

## 2021-02-08 PROCEDURE — 87086 URINE CULTURE/COLONY COUNT: CPT

## 2021-02-08 PROCEDURE — 85025 COMPLETE CBC W/AUTO DIFF WBC: CPT

## 2021-02-08 PROCEDURE — 85730 THROMBOPLASTIN TIME PARTIAL: CPT

## 2021-02-08 PROCEDURE — 83735 ASSAY OF MAGNESIUM: CPT

## 2021-02-08 PROCEDURE — 99152 MOD SED SAME PHYS/QHP 5/>YRS: CPT

## 2021-02-08 PROCEDURE — 80053 COMPREHEN METABOLIC PANEL: CPT

## 2021-02-08 PROCEDURE — 36415 COLL VENOUS BLD VENIPUNCTURE: CPT

## 2021-02-08 RX ORDER — HEPARIN SODIUM 200 [USP'U]/100ML
500 INJECTION, SOLUTION INTRAVENOUS
Status: COMPLETED | OUTPATIENT
Start: 2021-02-08 | End: 2021-02-08

## 2021-02-08 RX ORDER — FENTANYL CITRATE 50 UG/ML
25-200 INJECTION, SOLUTION INTRAMUSCULAR; INTRAVENOUS
Status: DISCONTINUED | OUTPATIENT
Start: 2021-02-08 | End: 2021-02-08 | Stop reason: HOSPADM

## 2021-02-08 RX ORDER — LIDOCAINE HYDROCHLORIDE 10 MG/ML
1-20 INJECTION INFILTRATION; PERINEURAL
Status: COMPLETED | OUTPATIENT
Start: 2021-02-08 | End: 2021-02-08

## 2021-02-08 RX ORDER — FLUMAZENIL 0.1 MG/ML
0.2 INJECTION INTRAVENOUS
Status: DISCONTINUED | OUTPATIENT
Start: 2021-02-08 | End: 2021-02-08 | Stop reason: HOSPADM

## 2021-02-08 RX ORDER — SODIUM CHLORIDE 9 MG/ML
25 INJECTION, SOLUTION INTRAVENOUS CONTINUOUS
Status: DISCONTINUED | OUTPATIENT
Start: 2021-02-08 | End: 2021-02-08 | Stop reason: HOSPADM

## 2021-02-08 RX ORDER — HEPARIN SODIUM (PORCINE) LOCK FLUSH IV SOLN 100 UNIT/ML 100 UNIT/ML
500 SOLUTION INTRAVENOUS
Status: DISCONTINUED | OUTPATIENT
Start: 2021-02-08 | End: 2021-02-08 | Stop reason: HOSPADM

## 2021-02-08 RX ORDER — CEFAZOLIN SODIUM/WATER 2 G/20 ML
2 SYRINGE (ML) INTRAVENOUS ONCE
Status: COMPLETED | OUTPATIENT
Start: 2021-02-08 | End: 2021-02-08

## 2021-02-08 RX ORDER — MIDAZOLAM HYDROCHLORIDE 1 MG/ML
.5-4 INJECTION, SOLUTION INTRAMUSCULAR; INTRAVENOUS
Status: DISCONTINUED | OUTPATIENT
Start: 2021-02-08 | End: 2021-02-08 | Stop reason: HOSPADM

## 2021-02-08 RX ORDER — NALOXONE HYDROCHLORIDE 0.4 MG/ML
0.4 INJECTION, SOLUTION INTRAMUSCULAR; INTRAVENOUS; SUBCUTANEOUS AS NEEDED
Status: DISCONTINUED | OUTPATIENT
Start: 2021-02-08 | End: 2021-02-08 | Stop reason: HOSPADM

## 2021-02-08 RX ADMIN — MIDAZOLAM HYDROCHLORIDE 1 MG: 1 INJECTION, SOLUTION INTRAMUSCULAR; INTRAVENOUS at 11:34

## 2021-02-08 RX ADMIN — FENTANYL CITRATE 50 MCG: 50 INJECTION, SOLUTION INTRAMUSCULAR; INTRAVENOUS at 11:28

## 2021-02-08 RX ADMIN — FENTANYL CITRATE 50 MCG: 50 INJECTION, SOLUTION INTRAMUSCULAR; INTRAVENOUS at 11:19

## 2021-02-08 RX ADMIN — CEFAZOLIN 2 G: 10 INJECTION, POWDER, FOR SOLUTION INTRAVENOUS at 11:16

## 2021-02-08 RX ADMIN — MIDAZOLAM HYDROCHLORIDE 1 MG: 1 INJECTION, SOLUTION INTRAMUSCULAR; INTRAVENOUS at 11:28

## 2021-02-08 RX ADMIN — MIDAZOLAM HYDROCHLORIDE 1 MG: 1 INJECTION, SOLUTION INTRAMUSCULAR; INTRAVENOUS at 11:19

## 2021-02-08 RX ADMIN — FENTANYL CITRATE 50 MCG: 50 INJECTION, SOLUTION INTRAMUSCULAR; INTRAVENOUS at 11:33

## 2021-02-08 RX ADMIN — HEPARIN SODIUM IN SODIUM CHLORIDE 1000 UNITS: 200 INJECTION INTRAVENOUS at 11:31

## 2021-02-08 RX ADMIN — LIDOCAINE HYDROCHLORIDE 12 ML: 10 INJECTION, SOLUTION INFILTRATION; PERINEURAL at 11:41

## 2021-02-08 RX ADMIN — SODIUM CHLORIDE 25 ML/HR: 900 INJECTION, SOLUTION INTRAVENOUS at 10:14

## 2021-02-08 RX ADMIN — HEPARIN SODIUM (PORCINE) LOCK FLUSH IV SOLN 100 UNIT/ML 300 UNITS: 100 SOLUTION at 11:38

## 2021-02-08 NOTE — PROCEDURES
Interventional Radiology Brief Procedure Note    Interventional Radiologist: Elvin Machado MD    Pre-operative Diagnosis: Endometrial cancer    Post-operative Diagnosis: Same as pre-operative diagnosis    Procedure(s) Performed:  Port placement    Anesthesia:  Local and Moderate Sedation    Findings: Port placed via left IJV with tip at the cavoatrial junction. Ready for immediate use. FULL REPORT TO FOLLOW. Complications: None    Estimated Blood Loss:  minimal    Tubes and Drains: None    Specimens: None    Condition: Good    Disposition: care unit for monitoring.      Elvin Machado MD  [facility]  2/8/2021

## 2021-02-08 NOTE — PROGRESS NOTES
Pt arrived on unit; Pt Alert and Oriented; Consent signed; See MAC_lab for sedation report and/or vital signs. Pt transferred to treatment table for procedure. Patient 24 g IV on rt ac not working, painful.

## 2021-02-08 NOTE — PROGRESS NOTES
Pt is all prepped and ready for procedure. 1155 Pt back to care unit S/P port placement. Pt awake and alert and tolerated procedure well. Tegaderm with gauze to rt chest dry and intact. Ice packs offered. No c/o pain offered. 1300 Discharge instructions reviewed and spouse and they verbalized all understandings.      18 Pt escorted to car and left with spouse in stable condition

## 2021-02-08 NOTE — DISCHARGE INSTRUCTIONS
Patient Education        Implanted [employer] [facility] HE[facility] PROVIDERS [facility] for Chemotherapy: Care Instructions  Your Care Instructions  An implanted port is a device placed, in most cases, under the skin of your chest below your collarbone. It is made of plastic, stainless steel, or titanium. The port is about the size of a quarter, but thicker. A thin, flexible tube called a catheter runs from the port under your skin into a large vein. A membrane (septum) similar to a pencil eraser is in the center of the port. A nurse uses a needle to put chemotherapy or other medicine and fluids through the septum into a blood vessel. The port may be used to draw blood for tests only if another vein, such as in the hand or arm, can't be used. An implanted port can be used for months. A special needle (called a Sanchez needle) may stay in the port for a short time. The port needs regular care to make sure it does not get blocked. Tell your doctor if you take aspirin or some other blood thinner. These medicines can increase the chance of bleeding inside your body. Follow-up care is a key part of your treatment and safety. Be sure to make and go to all appointments, and call your doctor if you are having problems. It's also a good idea to know your test results and keep a list of the medicines you take. How can you care for yourself at home? · You will probably need to take 1 day off from work and will be able return to normal activities shortly after. This depends on the type of work you do, why you have the port, and how you feel. · You probably will be able to bathe and swim. But you may need to avoid some activities if a Sanchez needle is left in the port. Talk to your doctor about any limits on your activity. · Some clothes may rub the skin over the port. Do not wear a bra or suspenders that irritate your skin near the port. · You will get a medical alert card with information about your port. Carry this with you.  It will tell health care workers you have a port in case you need emergency care. · Your port will need regular flushing to keep it open. A nurse or other health professional will do this for you. When should you call for help? Call your doctor now or seek immediate medical care if:    · You have signs of infection, such as:  ? Increased pain, swelling, warmth, or redness near the port. ? Red streaks leading from the port. ? Pus draining from the port. ? A fever.     · You have pain or swelling in your neck or arm.     · You have trouble breathing. Watch closely for changes in your health, and be sure to contact your doctor if:    · You have any problems with your port. Where can you learn more? Go to http://www.gray.com/  Enter P577 in the search box to learn more about \"Implanted [employer] [facility] [place] for Chemotherapy: Care Instructions. \"  Current as of: June 26, 2019               Content Version: 12.6  © 3909-2647 "[employer]. Care instructions adapted under license by [employer] (which disclaims liability or warranty for this information). If you have questions about a medical condition or this instruction, always ask your healthcare professional. [place] [address] any warranty or liability for your use of this information. DISCHARGE SUMMARY from Nurse    PATIENT INSTRUCTIONS:    After general anesthesia or intravenous sedation, for 24 hours or while taking prescription Narcotics:  · Limit your activities  · Do not drive and operate hazardous machinery  · Do not make important personal or business decisions  · Do  not drink alcoholic beverages  · If you have not urinated within 8 hours after discharge, please contact your surgeon on call.     Report the following to your surgeon:  · Excessive pain, swelling, redness or odor of or around the surgical area  · Temperature over 100.5  · Nausea and vomiting lasting longer than 4 hours or if unable to take medications  · Any signs of decreased circulation or nerve impairment to extremity: change in color, persistent  numbness, tingling, coldness or increase pain  · Any questions    What to do at Home:  Recommended activity: No lifting, Driving, or Strenuous exercise for 48 hours. *  Please give a list of your current medications to your Primary Care Provider. *  Please update this list whenever your medications are discontinued, doses are      changed, or new medications (including over-the-counter products) are added. *  Please carry medication information at all times in case of emergency situations. These are general instructions for a healthy lifestyle:    No smoking/ No tobacco products/ Avoid exposure to second hand smoke  Surgeon General's Warning:  Quitting smoking now greatly reduces serious risk to your health. Obesity, smoking, and sedentary lifestyle greatly increases your risk for illness    A healthy diet, regular physical exercise & weight monitoring are important for maintaining a healthy lifestyle    You may be retaining fluid if you have a history of heart failure or if you experience any of the following symptoms:  Weight gain of 3 pounds or more overnight or 5 pounds in a week, increased swelling in our hands or feet or shortness of breath while lying flat in bed. Please call your doctor as soon as you notice any of these symptoms; do not wait until your next office visit. The discharge information has been reviewed with the patient and spouse. The patient and spouse verbalized understanding. Discharge medications reviewed with the patient and spouse and appropriate educational materials and side effects teaching were provided.       Patient armband removed and shredded    ___________________________________________________________________________________________________________________________________

## 2021-02-09 ENCOUNTER — OFFICE VISIT (OUTPATIENT)
Dept: ONCOLOGY | Age: 41
End: 2021-02-09
Payer: COMMERCIAL

## 2021-02-09 VITALS
WEIGHT: 128 LBS | HEIGHT: 67 IN | OXYGEN SATURATION: 100 % | DIASTOLIC BLOOD PRESSURE: 72 MMHG | HEART RATE: 113 BPM | TEMPERATURE: 98.6 F | SYSTOLIC BLOOD PRESSURE: 116 MMHG | BODY MASS INDEX: 20.09 KG/M2

## 2021-02-09 DIAGNOSIS — C54.1 ENDOMETRIAL CANCER (HCC): Primary | ICD-10-CM

## 2021-02-09 LAB
BACTERIA SPEC CULT: NORMAL
CANCER AG125 SERPL-ACNC: 44 U/ML (ref 1.5–35)
CC UR VC: NORMAL
SERVICE CMNT-IMP: NORMAL

## 2021-02-09 PROCEDURE — 99024 POSTOP FOLLOW-UP VISIT: CPT | Performed by: OBSTETRICS & GYNECOLOGY

## 2021-02-09 RX ORDER — ONDANSETRON 4 MG/1
4 TABLET, FILM COATED ORAL
Qty: 30 TAB | Refills: 2 | Status: SHIPPED | OUTPATIENT
Start: 2021-02-09 | End: 2021-03-20 | Stop reason: SDUPTHER

## 2021-02-09 RX ORDER — DEXAMETHASONE 4 MG/1
TABLET ORAL
Qty: 20 TAB | Refills: 0 | Status: SHIPPED | OUTPATIENT
Start: 2021-02-09 | End: 2021-09-16 | Stop reason: ALTCHOICE

## 2021-02-09 RX ORDER — PROMETHAZINE HYDROCHLORIDE 25 MG/1
25 TABLET ORAL
Qty: 30 TAB | Refills: 2 | Status: SHIPPED | OUTPATIENT
Start: 2021-02-09

## 2021-02-09 RX ORDER — OXYCODONE AND ACETAMINOPHEN 5; 325 MG/1; MG/1
1 TABLET ORAL
Qty: 15 TAB | Refills: 0 | Status: SHIPPED | OUTPATIENT
Start: 2021-02-09 | End: 2021-02-14 | Stop reason: SDUPTHER

## 2021-02-09 RX ORDER — LIDOCAINE AND PRILOCAINE 25; 25 MG/G; MG/G
CREAM TOPICAL AS NEEDED
Qty: 30 G | Refills: 0 | Status: SHIPPED | OUTPATIENT
Start: 2021-02-09 | End: 2021-12-20 | Stop reason: SDUPTHER

## 2021-02-09 NOTE — PROGRESS NOTES
Shayne Morrison is a 36 y.o. female presents in office for 4 week post op. Chief Complaint   Patient presents with    Surgical Follow-up        Do you have any unusual vaginal bleeding, discharge or irritation? no  Do you have any changes in your bowel movements? no  Have you been experiencing nausea or vomiting? no  Have you been experiencing any continuous or worsening abdominal pain? Pt c/o soreness on incision sight  Any urinary burning? no    Visit Vitals  /72 (BP 1 Location: Right upper arm, BP Patient Position: Sitting)   Ht 5' 7\" (1.702 m)   Wt 128 lb (58.1 kg)   BMI 20.05 kg/m²         1. Have you been to the ER, urgent care clinic since your last visit? Hospitalized since your last visit? No    2. Have you seen or consulted any other health care providers outside of the [address] [place] [address] since your last visit? Include any pap smears or colon screening. No    3 most recent PHQ Screens 1/29/2021   Little interest or pleasure in doing things Not at all   Feeling down, depressed, irritable, or hopeless Not at all   Total Score PHQ 2 0       No flowsheet data found. No flowsheet data found. No flowsheet data found.

## 2021-02-09 NOTE — PROGRESS NOTES
[facility]  [address] [place] [address], [address], [address] [facility]  [address] [facility], [address] [facility]  [place], [address]   [phone]  Ava Neal DO        Patient ID:  Name: Antonietta Cortes  MRM: [id number]  : 1980/40 y.o. Date: 2021    SUBJECTIVE:    This is a 36 y.o.  female who presents s/p Robotic-assisted total laparoscopic hysterectomy, bilateral salpingo-oophorectomy, upper vaginectomy; bilateral pelvic and para-aortic lymph node dissection; peritoneal biopsies and washings; cystoscopy. on 2021  Appetite improved with megace. Having some itching at incisions. No bleeding. Her pathology revealed      A: CERVIX, UTERUS, BILATERAL FALLOPIAN TUBES, AND UPPER VAGINA, TOTAL HYSTERECTOMY WITH BILATERAL SALPINGO-OOPHORECTOMY AND PARTIAL VAGINECTOMY:   ENDOMETRIAL ENDOMETRIOID ADENOCARCINOMA, FIGO GRADE 1, WITH EXTENSIVE INVASION INTO THE CERVIX AND UPPER VAGINA (PLEASE SEE COMMENTS AND SYNOPTIC REPORT). SURGICAL MARGINS NEGATIVE. BENIGN BILATERAL FALLOPIAN TUBES AND OVARIES. B: PELVIC ADHESIONS, BIOPSY:   BENIGN FIBROUS TISSUE. NEGATIVE FOR MALIGNANCY. C: RIGHT PELVIC SIDEWALL PERITONEUM, BIOPSY:   ADENOCARCINOMA. D: RECTOSIGMOID EPIPLOICAE ADHESION, BIOPSY:   ADENOCARCINOMA. E: VAGINA, BIOPSY:   ACUTE INFLAMMATORY TISSUE. NO CARCINOMA SEEN. F: RIGHT PELVIC LYMPH NODE, DISSECTION:   THIRTEEN (13) BENIGN LYMPH NODES. G: LEFT PELVIC LYMPH NODE, DISSECTION:   EIGHT (8) BENIGN LYMPH NODES. H: RIGHT PARA-AORTIC LYMPH NODE, EXCISION:   ONE (1) BENIGN LYMPH NODE. I: LEFT PARA-AORTIC LYMPH NODE, EXCISION:   BENIGN FIBROADIPOSE TISSUE. NO LYMPHOID TISSUE SEEN. DIAGNOSIS COMMENT:   The intraoperative consultation (specimen A) is corroborated. Immunohistochemistry results for DNA mismatch repair proteins will be reported as an addendum. ENDOMETRIUM   SPECIMEN   Procedure:  Total hysterectomy and bilateral salpingo-oophorectomy   TUMOR   Histologic Type: Endometrioid carcinoma, NOS   Histologic Grade: FIGO grade 1   Myometrial Invasion: Present   Depth of Myometrial Invasion (Millimeters): 6 mm   Myometrial Thickness (Millimeters): 28 mm   Percentage of Myometrial Invasion: 21 %   Uterine Serosa Involvement: Not identified   Lower Uterine Segment Involvement: Present, myoinvasive   Cervical Stromal Involvement: Present   Other Tissue / Organ Involvement: Upper Vagina, Pelvic wall, Rectosigmoid adhesion   Peritoneal Ascitic Fluid: Atypical: favor reactive   Lymphovascular Invasion: Not identified   MARGINS   Margins: Ectocervical / Vaginal Cuff Margin: Uninvolved by carcinoma   LYMPH NODES   Lymph Node Status: All lymph nodes negative for tumor cells   Total Number of Pelvic Nodes Examined: 21   Total Number of Para-aortic Nodes Examined: 1   PATHOLOGIC STAGE CLASSIFICATION (pTNM, AJCC 8th Edition)   Primary Tumor (pT): pT3b   Regional Lymph Nodes (pN): pN0   Distant Metastasis (pM): pM1   Specify Site(s): Right Pelvic sidewall peritoneum   GROSS DESCRIPTION:   The specimen is received in nine parts. TEST(S) PERFORMED   Test(s) Performed: Mismatch Repair   Immunohistochemistry (IHC) Testing for Mismatch Repair (MMR)   Proteins: MLH1, MSH2, MSH6, PMS2   MLH1: Intact nuclear expression   MSH2: Loss of nuclear expression   MSH6: Loss of nuclear expression   PMS2: Intact nuclear expression     Medications:     Current Outpatient Medications on File Prior to Visit   Medication Sig Dispense Refill    oxyCODONE-acetaminophen (PERCOCET) 5-325 mg per tablet Take 1 Tab by mouth every four (4) hours as needed for Pain for up to 7 days. Max Daily Amount: 6 Tabs. 15 Tab 0    megestroL (MEGACE) 400 mg/10 mL (10 mL) suspension Take 10 mL by mouth two (2) times a day. 1 Bottle 3    ondansetron hcl (Zofran) 4 mg tablet Take 1 Tab by mouth every six (6) hours.  30 Tab 0    omeprazole (PriLOSEC OTC) 20 mg tablet Take 20 mg by mouth daily.  mv-min/iron/folic/calcium/vitK (WOMEN'S MULTIVITAMIN PO) Take  by mouth daily.  cyclobenzaprine (FLEXERIL) 10 mg tablet Take  by mouth three (3) times daily as needed for Muscle Spasm(s). Current Facility-Administered Medications on File Prior to Visit   Medication Dose Route Frequency Provider Last Rate Last Admin    [COMPLETED] lidocaine (XYLOCAINE) 10 mg/mL (1 %) injection 1-20 mL  1-20 mL SubCUTAneous RAD ONCE Araseli Beltre MD   12 mL at 02/08/21 1141       Allergies: Allergies   Allergen Reactions    Codeine Hives       OBJECTIVE:    Vitals:   Visit Vitals  /72 (BP 1 Location: Right upper arm, BP Patient Position: Sitting)   Pulse (!) 113   Temp 98.6 °F (37 °C) (Temporal)   Ht 5' 7\" (1.702 m)   Wt 58.1 kg (128 lb)   LMP 01/09/2021 (Approximate)   SpO2 100%   BMI 20.05 kg/m²       Physical Examination:    General:  alert, cooperative, no distress   Abdomen: soft, bowel sounds active, non-tender   Incision: healing well   Pelvic: NEFG, spec exam revealed cuff intact, BME revealed cuff intact   Rectal: not done   Extremity:   extremities normal, atraumatic, no cyanosis or edema     IMPRESSION/PLAN:    Stage IIIBG1 endometrial cancer  The operative procedures and clinical results have been reviewed with the patient.   Discussed recommendation for adjuvant chemotherapy  Chemo teaching today  Plan for carbo auc=6, taxol 175 mg/m2 IV every 3 wks x 6 cycles  Will send for CARis and will need genetic testing  Decreased appetite- megace sent to pharmacy  Briefly discussed anticipated side effects  Ok for cycle #1  F/u prior to cycle #2      [address] [place] [address]  Gynecologic Oncology  2/9/2021/12:59 PM

## 2021-02-09 NOTE — LETTER
NOTIFICATION OF RETURN TO WORK / SCHOOL 
 
2/9/2021 12:05 PM 
 
Ms. Cheryl Basilio [address] [place] [id number] [employer] To Whom It May Concern: 
 
Cheryl Basilio was under the care of Goldy Funk on 2/9/2021. She will be able to return to work/school on 3/10/2021 with no restrictions. If there are questions or concerns please have the patient contact our office. Sincerely, Ha Hernandez MD

## 2021-02-09 NOTE — PROGRESS NOTES
Patient here ambulatory with  for chemo teaching Carbo/Taxol Informed Consent signed. Patient given chemo folder with written information and it was also reviewed verbally with all questions answered. Information reviewed regarding lab work prior to chemo. (WBC'c, RBC's, Platelets and their basic functions) Patient aware of premedications both orally and intravenously given prior to chemo. Possible side effects of chemo discussed, which include:   Nausea/vomiting: Scripts called in for Zofran (every 6-8 hrs)and Phenergan(every 4-6 hrs). Patient knows to take the Zofran at the first sign of nausea and if after 2-3 hours they still have nausea to take the Phenergan. They have been made aware the phenergan may cause drowsiness. Medications may be alternated and patient knows to call if vomiting. RACHEL is also suggested for nausea. Given samples of rachel chews, may also try rachel tea, gum, snaps. Constipation: May take Senokot-S, miralax, colace, prunes, activia, power pudding, Milk of Magnesia. Patient knows that constipation may occur up to five days after chemo due to the premeds. They are to introduce the suggestions individually. Power pudding recipe given. Pt knows to call if has not had a bowel movement for 2 days. Diarrhea: Educated on the [facility]. Bananas,Rice, Apples, Toast, Yogurt. Patient knows they may take [address] [employer]. They are to start with 1 tablet after each episode. NO MORE than 8 tablets a day, and to call if more than 2 watery stools. Fever: Report all fevers greater than 100.5. Skin: Report all rashes. Use sunscreen, as the chemo will make you more sensitive and you run the risk of getting severe sunburn. Fatigue: Stay well hydrated, pace yourself. Supplement diet with Ensure or Boost or [place] Instant Breakfast. Samples given of Ensure and Ensure clear and coupons. Add fruit or yogurt to make them more of a smoothie to help with taste.  Eat small frequent meals that include protein. Scrambled eggs, cheese, peanut butter are good sources. Continue to try to maintain normal activities of daily living. If unable to get out of bed, Call. Dehydration: You will note to have extreme fatigue, increase in nausea. You should be drinking 1000-1500cc a day. Try to drink a cup of something every hour you are awake. May substitute with ice pops, soup. Gatorade, low caffeine drinks. Dry Mouth: Biotene mouthwash or gum. Trident gum. Mouth sores: Use salt and baking soda in a quart of water recipe. Call if has 2 mouth sores, as may need to change to Magic Mouthwash. Taste changes: Lemon drops, cold drinks, hard candies, ice pops. DO NOT use metal silverware or drink from cans if you have a metallic taste. .   Neuropathy: Numbness/tingling/burning sensation, diff buttoning clothes. Advised to take B-6 and B-12 or B-Complex twice a day to help with this. Hair loss: MAY occur 2-3 weeks after starting treatment. Use baby shampoo for tender scalp. Given information on the [employer]. Tour of [facility] given to pt, along with a tote with information and samples. Medications e-scribed to pharmacy with confirmation. (zofran, phenergan, emla, dex). Patient given a calendar for February 2021 with all appointments scheduled. Phone numbers given on how to reach the office for any questions or concerns.

## 2021-02-10 ENCOUNTER — APPOINTMENT (OUTPATIENT)
Dept: INFUSION THERAPY | Age: 41
End: 2021-02-10

## 2021-02-14 DIAGNOSIS — R10.2 PELVIC PAIN: ICD-10-CM

## 2021-02-15 RX ORDER — OXYCODONE AND ACETAMINOPHEN 5; 325 MG/1; MG/1
1 TABLET ORAL
Qty: 15 TAB | Refills: 0 | Status: SHIPPED | OUTPATIENT
Start: 2021-02-15 | End: 2021-02-25 | Stop reason: SDUPTHER

## 2021-02-16 ENCOUNTER — HOSPITAL ENCOUNTER (OUTPATIENT)
Dept: INFUSION THERAPY | Age: 41
Discharge: HOME | End: 2021-02-16
Payer: COMMERCIAL

## 2021-02-16 VITALS
TEMPERATURE: 98 F | WEIGHT: 127.9 LBS | SYSTOLIC BLOOD PRESSURE: 113 MMHG | BODY MASS INDEX: 20.07 KG/M2 | RESPIRATION RATE: 16 BRPM | OXYGEN SATURATION: 98 % | HEIGHT: 67 IN | HEART RATE: 112 BPM | DIASTOLIC BLOOD PRESSURE: 50 MMHG

## 2021-02-16 LAB
ALBUMIN SERPL-MCNC: 3.5 G/DL (ref 3.4–5)
ALBUMIN/GLOB SERPL: 1.1 {RATIO} (ref 0.8–1.7)
ALP SERPL-CCNC: 51 U/L (ref 45–117)
ALT SERPL-CCNC: 15 U/L (ref 13–56)
ANION GAP SERPL CALC-SCNC: 8 MMOL/L (ref 3–18)
APPEARANCE UR: CLEAR
AST SERPL-CCNC: 7 U/L (ref 10–38)
BASOPHILS # BLD: 0.1 K/UL (ref 0–0.1)
BASOPHILS NFR BLD: 1 % (ref 0–2)
BILIRUB SERPL-MCNC: 0.3 MG/DL (ref 0.2–1)
BILIRUB UR QL: NEGATIVE
BUN SERPL-MCNC: 11 MG/DL (ref 7–18)
BUN/CREAT SERPL: 14 (ref 12–20)
CALCIUM SERPL-MCNC: 8.5 MG/DL (ref 8.5–10.1)
CHLORIDE SERPL-SCNC: 108 MMOL/L (ref 100–111)
CO2 SERPL-SCNC: 25 MMOL/L (ref 21–32)
COLOR UR: NORMAL
CREAT SERPL-MCNC: 0.78 MG/DL (ref 0.6–1.3)
DIFFERENTIAL METHOD BLD: ABNORMAL
EOSINOPHIL # BLD: 0.1 K/UL (ref 0–0.4)
EOSINOPHIL NFR BLD: 1 % (ref 0–5)
ERYTHROCYTE [DISTWIDTH] IN BLOOD BY AUTOMATED COUNT: 16.5 % (ref 11.6–14.5)
GLOBULIN SER CALC-MCNC: 3.2 G/DL (ref 2–4)
GLUCOSE SERPL-MCNC: 86 MG/DL (ref 74–99)
GLUCOSE UR STRIP.AUTO-MCNC: NEGATIVE MG/DL
HCT VFR BLD AUTO: 28.6 % (ref 35–45)
HGB BLD-MCNC: 7.7 G/DL (ref 12–16)
HGB UR QL STRIP: NEGATIVE
KETONES UR QL STRIP.AUTO: NEGATIVE MG/DL
LEUKOCYTE ESTERASE UR QL STRIP.AUTO: NEGATIVE
LYMPHOCYTES # BLD: 2.7 K/UL (ref 0.9–3.6)
LYMPHOCYTES NFR BLD: 24 % (ref 21–52)
MAGNESIUM SERPL-MCNC: 2.1 MG/DL (ref 1.6–2.6)
MCH RBC QN AUTO: 19.3 PG (ref 24–34)
MCHC RBC AUTO-ENTMCNC: 26.9 G/DL (ref 31–37)
MCV RBC AUTO: 71.9 FL (ref 74–97)
MONOCYTES # BLD: 1 K/UL (ref 0.05–1.2)
MONOCYTES NFR BLD: 9 % (ref 3–10)
NEUTS SEG # BLD: 7.5 K/UL (ref 1.8–8)
NEUTS SEG NFR BLD: 65 % (ref 40–73)
NITRITE UR QL STRIP.AUTO: NEGATIVE
PH UR STRIP: 6 [PH] (ref 5–8)
PLATELET # BLD AUTO: 252 K/UL (ref 135–420)
POTASSIUM SERPL-SCNC: 3.9 MMOL/L (ref 3.5–5.5)
PROT SERPL-MCNC: 6.7 G/DL (ref 6.4–8.2)
PROT UR STRIP-MCNC: NEGATIVE MG/DL
RBC # BLD AUTO: 3.98 M/UL (ref 4.2–5.3)
RBC MORPH BLD: ABNORMAL
SODIUM SERPL-SCNC: 141 MMOL/L (ref 136–145)
SP GR UR REFRACTOMETRY: 1.01 (ref 1–1.03)
UROBILINOGEN UR QL STRIP.AUTO: 1 EU/DL (ref 0.2–1)
WBC # BLD AUTO: 11.4 K/UL (ref 4.6–13.2)

## 2021-02-16 PROCEDURE — 36415 COLL VENOUS BLD VENIPUNCTURE: CPT

## 2021-02-16 PROCEDURE — 81003 URINALYSIS AUTO W/O SCOPE: CPT

## 2021-02-16 PROCEDURE — 36591 DRAW BLOOD OFF VENOUS DEVICE: CPT

## 2021-02-16 PROCEDURE — 83735 ASSAY OF MAGNESIUM: CPT

## 2021-02-16 PROCEDURE — 85025 COMPLETE CBC W/AUTO DIFF WBC: CPT

## 2021-02-16 PROCEDURE — 80053 COMPREHEN METABOLIC PANEL: CPT

## 2021-02-16 PROCEDURE — 86304 IMMUNOASSAY TUMOR CA 125: CPT

## 2021-02-16 PROCEDURE — 74011250636 HC RX REV CODE- 250/636

## 2021-02-16 PROCEDURE — 87086 URINE CULTURE/COLONY COUNT: CPT

## 2021-02-16 PROCEDURE — 77030012965 HC NDL HUBR BBMI -A

## 2021-02-16 RX ORDER — SODIUM CHLORIDE 0.9 % (FLUSH) 0.9 %
5-10 SYRINGE (ML) INJECTION AS NEEDED
Status: DISCONTINUED | OUTPATIENT
Start: 2021-02-16 | End: 2021-02-18 | Stop reason: HOSPADM

## 2021-02-16 RX ORDER — HEPARIN 100 UNIT/ML
SYRINGE INTRAVENOUS
Status: COMPLETED
Start: 2021-02-16 | End: 2021-02-16

## 2021-02-16 RX ORDER — HEPARIN 100 UNIT/ML
500 SYRINGE INTRAVENOUS ONCE
Status: COMPLETED | OUTPATIENT
Start: 2021-02-16 | End: 2021-02-16

## 2021-02-16 RX ADMIN — Medication 10 ML: at 12:06

## 2021-02-16 RX ADMIN — HEPARIN 500 UNITS: 100 SYRINGE at 12:06

## 2021-02-16 NOTE — PROGRESS NOTES
[facility] Progress Note    Date: 2021    Name: Shayne Morrison    MRN: [id number]         : 1980    Pre-chemo labs    Ms. Nadya Perdomo was assessed and education was provided. Pt oriented to unit and unit policies. Pt denies any pain or discomfort, hysterectomy incisions and port placement incisions healing appropriately. Ms. Enrique's vitals were reviewed and patient was observed for 5 minutes prior to treatment. Visit Vitals  BP (!) 113/50 (BP 1 Location: Left arm, BP Patient Position: Sitting)   Pulse (!) 112   Resp 16   Ht 5' 7\" (1.702 m)   Wt 58 kg (127 lb 14.4 oz)   SpO2 98%   Breastfeeding No   BMI 20.03 kg/m²         LCWMP accessed per protocol, flushes easily with brisk blood return after laying the patient flat. Labs obtained per policy. Flushed with NS and heparin. De-accessed. Pt declines bandaid. Lab results were obtained and reviewed. Recent Results (from the past 12 hour(s))   CBC WITH AUTOMATED DIFF    Collection Time: 21 11:45 AM   Result Value Ref Range    WBC 11.4 4.6 - 13.2 K/uL    RBC 3.98 (L) 4.20 - 5.30 M/uL    HGB 7.7 (L) 12.0 - 16.0 g/dL    HCT 28.6 (L) 35.0 - 45.0 %    MCV 71.9 (L) 74.0 - 97.0 FL    MCH 19.3 (L) 24.0 - 34.0 PG    MCHC 26.9 (L) 31.0 - 37.0 g/dL    RDW 16.5 (H) 11.6 - 14.5 %    PLATELET 584 018 - 443 K/uL    NEUTROPHILS 65 40 - 73 %    LYMPHOCYTES 24 21 - 52 %    MONOCYTES 9 3 - 10 %    EOSINOPHILS 1 0 - 5 %    BASOPHILS 1 0 - 2 %    ABS. NEUTROPHILS 7.5 1.8 - 8.0 K/UL    ABS. LYMPHOCYTES 2.7 0.9 - 3.6 K/UL    ABS. MONOCYTES 1.0 0.05 - 1.2 K/UL    ABS. EOSINOPHILS 0.1 0.0 - 0.4 K/UL    ABS.  BASOPHILS 0.1 0.0 - 0.1 K/UL    RBC COMMENTS HYPOCHROMIA  3+        RBC COMMENTS MICROCYTOSIS  2+        RBC COMMENTS SCHISTOCYTES  1+        DF AUTOMATED     MAGNESIUM    Collection Time: 21 11:45 AM   Result Value Ref Range    Magnesium 2.1 1.6 - 2.6 mg/dL   METABOLIC PANEL, COMPREHENSIVE    Collection Time: 21 11:45 AM   Result Value Ref Range    Sodium 141 136 - 145 mmol/L    Potassium 3.9 3.5 - 5.5 mmol/L    Chloride 108 100 - 111 mmol/L    CO2 25 21 - 32 mmol/L    Anion gap 8 3.0 - 18 mmol/L    Glucose 86 74 - 99 mg/dL    BUN 11 7.0 - 18 MG/DL    Creatinine 0.78 0.6 - 1.3 MG/DL    BUN/Creatinine ratio 14 12 - 20      GFR est AA >60 >60 ml/min/1.73m2    GFR est non-AA >60 >60 ml/min/1.73m2    Calcium 8.5 8.5 - 10.1 MG/DL    Bilirubin, total 0.3 0.2 - 1.0 MG/DL    ALT (SGPT) 15 13 - 56 U/L    AST (SGOT) 7 (L) 10 - 38 U/L    Alk. phosphatase 51 45 - 117 U/L    Protein, total 6.7 6.4 - 8.2 g/dL    Albumin 3.5 3.4 - 5.0 g/dL    Globulin 3.2 2.0 - 4.0 g/dL    A-G Ratio 1.1 0.8 - 1.7     URINALYSIS W/ RFLX MICROSCOPIC    Collection Time: 02/16/21 11:45 AM   Result Value Ref Range    Color DARK YELLOW      Appearance CLEAR      Specific gravity 1.015 1.005 - 1.030      pH (UA) 6.0 5.0 - 8.0      Protein Negative NEG mg/dL    Glucose Negative NEG mg/dL    Ketone Negative NEG mg/dL    Bilirubin Negative NEG      Blood Negative NEG      Urobilinogen 1.0 0.2 - 1.0 EU/dL    Nitrites Negative NEG      Leukocyte Esterase Negative NEG         MD Hardeep Wilkins made aware of hgb level of 7.7 and verbalizes understanding. Per MD, redraw CBC prior to treatment to determine need for blood transfusion. Ms. Aurora Reeves tolerated the infusion, and had no complaints. Patient armband removed and shredded. Ms. Aurora Reeves was discharged from [place] [address] in stable condition at 1200. She is to return on 02/17/2021 at 0800 for her next appointment.     Jorge Murphy RN  February 16, 2021  2:33 PM

## 2021-02-17 ENCOUNTER — HOSPITAL ENCOUNTER (OUTPATIENT)
Dept: INFUSION THERAPY | Age: 41
Discharge: HOME | End: 2021-02-17
Payer: COMMERCIAL

## 2021-02-17 VITALS
OXYGEN SATURATION: 99 % | DIASTOLIC BLOOD PRESSURE: 73 MMHG | SYSTOLIC BLOOD PRESSURE: 123 MMHG | TEMPERATURE: 98.6 F | HEART RATE: 114 BPM | RESPIRATION RATE: 16 BRPM

## 2021-02-17 DIAGNOSIS — C54.1 ENDOMETRIAL CANCER (HCC): Primary | ICD-10-CM

## 2021-02-17 DIAGNOSIS — F51.01 PRIMARY INSOMNIA: Primary | ICD-10-CM

## 2021-02-17 LAB
BASO+EOS+MONOS # BLD AUTO: 0.3 K/UL (ref 0–2.3)
BASO+EOS+MONOS NFR BLD AUTO: 2 % (ref 0.1–17)
CANCER AG125 SERPL-ACNC: 31 U/ML (ref 1.5–35)
DIFFERENTIAL METHOD BLD: ABNORMAL
ERYTHROCYTE [DISTWIDTH] IN BLOOD BY AUTOMATED COUNT: 15.3 % (ref 11.5–14.5)
HCT VFR BLD AUTO: 29.5 % (ref 36–48)
HGB BLD-MCNC: 8.3 G/DL (ref 12–16)
LYMPHOCYTES # BLD: 0.6 K/UL (ref 1.1–5.9)
LYMPHOCYTES NFR BLD: 4 % (ref 14–44)
MCH RBC QN AUTO: 20.1 PG (ref 25–35)
MCHC RBC AUTO-ENTMCNC: 28.1 G/DL (ref 31–37)
MCV RBC AUTO: 71.6 FL (ref 78–102)
NEUTS SEG # BLD: 14 K/UL (ref 1.8–9.5)
NEUTS SEG NFR BLD: 94 % (ref 40–70)
PLATELET # BLD AUTO: 290 K/UL (ref 135–420)
RBC # BLD AUTO: 4.12 M/UL (ref 4.1–5.1)
WBC # BLD AUTO: 14.9 K/UL (ref 4.5–13)

## 2021-02-17 PROCEDURE — 85049 AUTOMATED PLATELET COUNT: CPT

## 2021-02-17 PROCEDURE — 96367 TX/PROPH/DG ADDL SEQ IV INF: CPT

## 2021-02-17 PROCEDURE — 74011000258 HC RX REV CODE- 258: Performed by: OBSTETRICS & GYNECOLOGY

## 2021-02-17 PROCEDURE — 77030012965 HC NDL HUBR BBMI -A

## 2021-02-17 PROCEDURE — 96415 CHEMO IV INFUSION ADDL HR: CPT

## 2021-02-17 PROCEDURE — 74011250636 HC RX REV CODE- 250/636: Performed by: OBSTETRICS & GYNECOLOGY

## 2021-02-17 PROCEDURE — 96417 CHEMO IV INFUS EACH ADDL SEQ: CPT

## 2021-02-17 PROCEDURE — 85025 COMPLETE CBC W/AUTO DIFF WBC: CPT

## 2021-02-17 PROCEDURE — 96375 TX/PRO/DX INJ NEW DRUG ADDON: CPT

## 2021-02-17 PROCEDURE — 96413 CHEMO IV INFUSION 1 HR: CPT

## 2021-02-17 RX ORDER — SODIUM CHLORIDE 0.9 % (FLUSH) 0.9 %
10 SYRINGE (ML) INJECTION AS NEEDED
Status: DISPENSED | OUTPATIENT
Start: 2021-02-17 | End: 2021-02-17

## 2021-02-17 RX ORDER — SODIUM CHLORIDE 9 MG/ML
25 INJECTION, SOLUTION INTRAVENOUS CONTINUOUS
Status: DISPENSED | OUTPATIENT
Start: 2021-02-17 | End: 2021-02-17

## 2021-02-17 RX ORDER — HEPARIN 100 UNIT/ML
300-500 SYRINGE INTRAVENOUS AS NEEDED
Status: DISPENSED | OUTPATIENT
Start: 2021-02-17 | End: 2021-02-17

## 2021-02-17 RX ORDER — DIPHENHYDRAMINE HYDROCHLORIDE 50 MG/ML
50 INJECTION, SOLUTION INTRAMUSCULAR; INTRAVENOUS ONCE
Status: COMPLETED | OUTPATIENT
Start: 2021-02-17 | End: 2021-02-17

## 2021-02-17 RX ORDER — FERROUS SULFATE 325(65) MG
325 TABLET, DELAYED RELEASE (ENTERIC COATED) ORAL DAILY
Qty: 90 TAB | Refills: 3 | Status: CANCELLED | OUTPATIENT
Start: 2021-02-17

## 2021-02-17 RX ORDER — ZOLPIDEM TARTRATE 5 MG/1
5 TABLET ORAL
Qty: 30 TAB | Refills: 0 | Status: SHIPPED | OUTPATIENT
Start: 2021-02-17 | End: 2021-03-16 | Stop reason: SDUPTHER

## 2021-02-17 RX ORDER — LORAZEPAM 2 MG/ML
0.26 INJECTION INTRAMUSCULAR ONCE
Status: ACTIVE | OUTPATIENT
Start: 2021-02-17 | End: 2021-02-17

## 2021-02-17 RX ORDER — FERROUS SULFATE 300 MG/5ML
300 LIQUID (ML) ORAL DAILY
Qty: 150 ML | Refills: 5 | Status: SHIPPED | OUTPATIENT
Start: 2021-02-17 | End: 2021-08-16

## 2021-02-17 RX ADMIN — SODIUM CHLORIDE 150 MG: 900 INJECTION, SOLUTION INTRAVENOUS at 09:02

## 2021-02-17 RX ADMIN — SODIUM CHLORIDE 500 ML: 900 INJECTION, SOLUTION INTRAVENOUS at 08:15

## 2021-02-17 RX ADMIN — DIPHENHYDRAMINE HYDROCHLORIDE 50 MG: 50 INJECTION, SOLUTION INTRAMUSCULAR; INTRAVENOUS at 09:49

## 2021-02-17 RX ADMIN — CARBOPLATIN 700 MG: 10 INJECTION, SOLUTION INTRAVENOUS at 13:40

## 2021-02-17 RX ADMIN — DEXAMETHASONE SODIUM PHOSPHATE: 10 INJECTION INTRAMUSCULAR; INTRAVENOUS at 09:30

## 2021-02-17 RX ADMIN — HEPARIN 500 UNITS: 100 SYRINGE at 15:00

## 2021-02-17 RX ADMIN — SODIUM CHLORIDE 25 ML/HR: 900 INJECTION, SOLUTION INTRAVENOUS at 08:40

## 2021-02-17 RX ADMIN — FAMOTIDINE 20 MG: 10 INJECTION INTRAVENOUS at 09:45

## 2021-02-17 RX ADMIN — Medication 10 ML: at 15:00

## 2021-02-17 RX ADMIN — PACLITAXEL 288 MG: 6 INJECTION, SOLUTION INTRAVENOUS at 10:11

## 2021-02-17 NOTE — PROGRESS NOTES
[facility] Progress Note    Date: 2021    Name: Antonietta Cortes              MRN: [id number]              : 1980    Chemotherapy Cycle: Carbo/Taxol C1D1    Ms. Arlyn Ortiz was assessed and education was provided. Patient informed that a CBC will be drawn today to recheck her hgb level and the possibility of a blood transfusion if it does not improve. Pt immediately becomes visibly agitated and crying, demands to have blood transfusion today if necessary. Pt educated on hospital processes of transfusions, but refuses to listen, crying, and cursing at the nursing staff stating things like \"I looked it up online, I know you're lying to me. \" Pt is reassured and MD Dariana Zhu notified of situation. Hgb recheck shows 8.3, MD verbalizes understanding and orders ok to proceed with treatment WITHOUT a blood transfusion. Pt verbalizes understanding yet continues to demand a blood transfusion today. Pt informed that she is safe to receive treatment without a blood transfusion per doctor order given hgb of 8.3. Carbo/Taxol education completed with patient. Patient verbalizes understanding of possible risks, benefits, side effects, and signs and symptoms of an allergic reaction. Ms. Enrique's vitals were reviewed and patient was observed for 5 minutes prior to treatment. Visit Vitals  /75 (BP 1 Location: Left arm, BP Patient Position: Sitting)   Pulse (!) 106   Temp 98.6 °F (37 °C)   Resp 16   SpO2 99%       Lab results were obtained and reviewed.   Recent Results (from the past 12 hour(s))   CBC WITH 3 PART DIFF    Collection Time: 21  8:15 AM   Result Value Ref Range    WBC 14.9 (H) 4.5 - 13.0 K/uL    RBC 4.12 4.10 - 5.10 M/uL    HGB 8.3 (L) 12.0 - 16.0 g/dL    HCT 29.5 (L) 36 - 48 %    MCV 71.6 (L) 78 - 102 FL    MCH 20.1 (L) 25.0 - 35.0 PG    MCHC 28.1 (L) 31 - 37 g/dL    RDW 15.3 (H) 11.5 - 14.5 %    NEUTROPHILS 94 (H) 40 - 70 %    MIXED CELLS 2 0.1 - 17 %    LYMPHOCYTES 4 (L) 14 - 44 % ABS. NEUTROPHILS 14.0 (H) 1.8 - 9.5 K/UL    ABS. MIXED CELLS 0.3 0.0 - 2.3 K/uL    ABS. LYMPHOCYTES 0.6 (L) 1.1 - 5.9 K/UL    DF AUTOMATED         Verified a second time with MD Gilma Yadav that pt does not need a blood transfusion given hgb is greater than 7.9 today. RCWMP accessed per protocol, flushes easily with brisk blood return.  ml bolus infusing per order. Pre-medications were administered as follows:    IV Emend 150mg   IV Decadron 12mg/Zofran 16mg   IV Pepcid 20mg  IV Benadryl 50mg    Pt declines ativan stating \"well now I can't trust you so I don't want you giving me ativan\". Pt reassured that her safety is a number one priority. 30 minutes post pre-medications, chemotherapy infused as follows:    Paclitaxel 288mg was infused over 3 hours per order. VS remain stable, pt denies any s/s of an allergic reaction, denies CP, SOB, itching, hives, facial swelling or flushing. Carboplatin 700mg infused over 1 hour per order. VS remain stable, pt denies any s/s of an allergic reaction, denies CP, SOB, itching, hives, facial swelling or flushing. Patient observed for 30 minutes post chemotherapy without any complaints. Port flushed with NS and heparin per protocol and de-accessed. Band aid declined by patient. Ms. P[facility] tolerated infusion, and had no complaints at this time. Patient armband removed and shredded. Ms. PATE [facility] was discharged from [place] [address] in stable condition at 1500. She is to return on 03/09/2021 at 36 for her next appointment.      Carl Recio RN  February 17, 2021  11:29 AM

## 2021-02-17 NOTE — PROGRESS NOTES

## 2021-02-18 LAB
BACTERIA SPEC CULT: NORMAL
SERVICE CMNT-IMP: NORMAL

## 2021-02-25 DIAGNOSIS — R10.2 PELVIC PAIN: ICD-10-CM

## 2021-02-26 RX ORDER — OXYCODONE AND ACETAMINOPHEN 5; 325 MG/1; MG/1
1 TABLET ORAL
Qty: 15 TAB | Refills: 0 | Status: SHIPPED | OUTPATIENT
Start: 2021-02-26 | End: 2021-03-07 | Stop reason: SDUPTHER

## 2021-03-02 ENCOUNTER — APPOINTMENT (OUTPATIENT)
Dept: INFUSION THERAPY | Age: 41
End: 2021-03-02

## 2021-03-03 ENCOUNTER — APPOINTMENT (OUTPATIENT)
Dept: INFUSION THERAPY | Age: 41
End: 2021-03-03

## 2021-03-03 ENCOUNTER — PATIENT MESSAGE (OUTPATIENT)
Dept: ONCOLOGY | Age: 41
End: 2021-03-03

## 2021-03-03 DIAGNOSIS — R10.2 PELVIC PAIN: Primary | ICD-10-CM

## 2021-03-03 RX ORDER — ALBUTEROL SULFATE 0.83 MG/ML
2.5 SOLUTION RESPIRATORY (INHALATION) AS NEEDED
Status: CANCELLED
Start: 2021-03-10

## 2021-03-03 RX ORDER — HYDROCORTISONE SODIUM SUCCINATE 100 MG/2ML
100 INJECTION, POWDER, FOR SOLUTION INTRAMUSCULAR; INTRAVENOUS AS NEEDED
Status: CANCELLED | OUTPATIENT
Start: 2021-03-10

## 2021-03-03 RX ORDER — ONDANSETRON 2 MG/ML
8 INJECTION INTRAMUSCULAR; INTRAVENOUS AS NEEDED
Status: CANCELLED | OUTPATIENT
Start: 2021-03-10

## 2021-03-03 RX ORDER — EPINEPHRINE 1 MG/ML
0.3 INJECTION, SOLUTION, CONCENTRATE INTRAVENOUS AS NEEDED
Status: CANCELLED | OUTPATIENT
Start: 2021-03-10

## 2021-03-03 RX ORDER — MAGNESIUM SULFATE HEPTAHYDRATE 40 MG/ML
2 INJECTION, SOLUTION INTRAVENOUS
Status: CANCELLED
Start: 2021-03-10

## 2021-03-03 RX ORDER — ACETAMINOPHEN 325 MG/1
650 TABLET ORAL AS NEEDED
Status: CANCELLED
Start: 2021-03-10

## 2021-03-03 RX ORDER — POTASSIUM CHLORIDE 7.45 MG/ML
10 INJECTION INTRAVENOUS
Status: CANCELLED
Start: 2021-03-10

## 2021-03-03 RX ORDER — LORAZEPAM 2 MG/ML
0.26 INJECTION INTRAMUSCULAR ONCE
Status: CANCELLED
Start: 2021-03-10 | End: 2021-03-10

## 2021-03-03 RX ORDER — DIPHENHYDRAMINE HYDROCHLORIDE 50 MG/ML
50 INJECTION, SOLUTION INTRAMUSCULAR; INTRAVENOUS AS NEEDED
Status: CANCELLED
Start: 2021-03-10

## 2021-03-03 RX ORDER — PROCHLORPERAZINE EDISYLATE 5 MG/ML
10 INJECTION INTRAMUSCULAR; INTRAVENOUS
Status: CANCELLED
Start: 2021-03-10

## 2021-03-03 RX ORDER — DIPHENHYDRAMINE HYDROCHLORIDE 50 MG/ML
25 INJECTION, SOLUTION INTRAMUSCULAR; INTRAVENOUS AS NEEDED
Status: CANCELLED
Start: 2021-03-10

## 2021-03-03 RX ORDER — LORAZEPAM 0.5 MG/1
0.5 TABLET ORAL
Qty: 30 TAB | Refills: 0 | Status: SHIPPED | OUTPATIENT
Start: 2021-03-03 | End: 2021-07-13 | Stop reason: SDUPTHER

## 2021-03-03 NOTE — TELEPHONE ENCOUNTER
From: Mery Arcos  To: Dylan Holm MD  Sent: 3/3/2021 8:34 AM EST  Subject: Non-Urgent Medical Question    Zolpidem isn't helping me get rest at night. It worked for the first couple days but I'm waking multiple times throughout the night and finding it difficult to fall back asleep.

## 2021-03-07 DIAGNOSIS — R10.2 PELVIC PAIN: ICD-10-CM

## 2021-03-09 ENCOUNTER — HOSPITAL ENCOUNTER (OUTPATIENT)
Dept: INFUSION THERAPY | Age: 41
Discharge: HOME | End: 2021-03-09
Payer: COMMERCIAL

## 2021-03-09 ENCOUNTER — OFFICE VISIT (OUTPATIENT)
Dept: ONCOLOGY | Age: 41
End: 2021-03-09
Payer: COMMERCIAL

## 2021-03-09 VITALS
OXYGEN SATURATION: 96 % | SYSTOLIC BLOOD PRESSURE: 121 MMHG | WEIGHT: 133 LBS | BODY MASS INDEX: 20.88 KG/M2 | DIASTOLIC BLOOD PRESSURE: 83 MMHG | TEMPERATURE: 97.3 F | HEART RATE: 103 BPM | HEIGHT: 67 IN

## 2021-03-09 VITALS
BODY MASS INDEX: 21.02 KG/M2 | HEIGHT: 67 IN | RESPIRATION RATE: 16 BRPM | OXYGEN SATURATION: 96 % | SYSTOLIC BLOOD PRESSURE: 121 MMHG | TEMPERATURE: 97.3 F | WEIGHT: 133.9 LBS | DIASTOLIC BLOOD PRESSURE: 83 MMHG | HEART RATE: 103 BPM

## 2021-03-09 DIAGNOSIS — C54.1 ENDOMETRIAL CANCER (HCC): Primary | ICD-10-CM

## 2021-03-09 LAB
ALBUMIN SERPL-MCNC: 3.6 G/DL (ref 3.4–5)
ALBUMIN/GLOB SERPL: 1.2 {RATIO} (ref 0.8–1.7)
ALP SERPL-CCNC: 74 U/L (ref 45–117)
ALT SERPL-CCNC: 18 U/L (ref 13–56)
ANION GAP SERPL CALC-SCNC: 4 MMOL/L (ref 3–18)
APPEARANCE UR: CLEAR
AST SERPL-CCNC: 11 U/L (ref 10–38)
BASOPHILS # BLD: 0 K/UL (ref 0–0.1)
BASOPHILS NFR BLD: 0 % (ref 0–2)
BILIRUB SERPL-MCNC: 0.2 MG/DL (ref 0.2–1)
BILIRUB UR QL: NEGATIVE
BUN SERPL-MCNC: 11 MG/DL (ref 7–18)
BUN/CREAT SERPL: 14 (ref 12–20)
CALCIUM SERPL-MCNC: 8.8 MG/DL (ref 8.5–10.1)
CANCER AG125 SERPL-ACNC: 30 U/ML (ref 1.5–35)
CHLORIDE SERPL-SCNC: 110 MMOL/L (ref 100–111)
CO2 SERPL-SCNC: 26 MMOL/L (ref 21–32)
COLOR UR: ABNORMAL
CREAT SERPL-MCNC: 0.78 MG/DL (ref 0.6–1.3)
DIFFERENTIAL METHOD BLD: ABNORMAL
EOSINOPHIL # BLD: 0.1 K/UL (ref 0–0.4)
EOSINOPHIL NFR BLD: 1 % (ref 0–5)
ERYTHROCYTE [DISTWIDTH] IN BLOOD BY AUTOMATED COUNT: 25.3 % (ref 11.6–14.5)
GLOBULIN SER CALC-MCNC: 3.1 G/DL (ref 2–4)
GLUCOSE SERPL-MCNC: 87 MG/DL (ref 74–99)
GLUCOSE UR STRIP.AUTO-MCNC: NEGATIVE MG/DL
HCT VFR BLD AUTO: 34.3 % (ref 35–45)
HGB BLD-MCNC: 9.6 G/DL (ref 12–16)
HGB UR QL STRIP: NEGATIVE
KETONES UR QL STRIP.AUTO: ABNORMAL MG/DL
LEUKOCYTE ESTERASE UR QL STRIP.AUTO: NEGATIVE
LYMPHOCYTES # BLD: 1.7 K/UL (ref 0.9–3.6)
LYMPHOCYTES NFR BLD: 24 % (ref 21–52)
MAGNESIUM SERPL-MCNC: 2.3 MG/DL (ref 1.6–2.6)
MCH RBC QN AUTO: 22 PG (ref 24–34)
MCHC RBC AUTO-ENTMCNC: 28 G/DL (ref 31–37)
MCV RBC AUTO: 78.7 FL (ref 74–97)
MONOCYTES # BLD: 0.6 K/UL (ref 0.05–1.2)
MONOCYTES NFR BLD: 9 % (ref 3–10)
NEUTS SEG # BLD: 4.7 K/UL (ref 1.8–8)
NEUTS SEG NFR BLD: 66 % (ref 40–73)
NITRITE UR QL STRIP.AUTO: NEGATIVE
PH UR STRIP: 5 [PH] (ref 5–8)
PLATELET # BLD AUTO: 344 K/UL (ref 135–420)
PMV BLD AUTO: 10.6 FL (ref 9.2–11.8)
POTASSIUM SERPL-SCNC: 4 MMOL/L (ref 3.5–5.5)
PROT SERPL-MCNC: 6.7 G/DL (ref 6.4–8.2)
PROT UR STRIP-MCNC: NEGATIVE MG/DL
RBC # BLD AUTO: 4.36 M/UL (ref 4.2–5.3)
SODIUM SERPL-SCNC: 140 MMOL/L (ref 136–145)
SP GR UR REFRACTOMETRY: 1.02 (ref 1–1.03)
UROBILINOGEN UR QL STRIP.AUTO: 0.2 EU/DL (ref 0.2–1)
WBC # BLD AUTO: 7.1 K/UL (ref 4.6–13.2)

## 2021-03-09 PROCEDURE — 99215 OFFICE O/P EST HI 40 MIN: CPT | Performed by: OBSTETRICS & GYNECOLOGY

## 2021-03-09 PROCEDURE — 77030012965 HC NDL HUBR BBMI -A

## 2021-03-09 PROCEDURE — 80053 COMPREHEN METABOLIC PANEL: CPT

## 2021-03-09 PROCEDURE — 83735 ASSAY OF MAGNESIUM: CPT

## 2021-03-09 PROCEDURE — 36591 DRAW BLOOD OFF VENOUS DEVICE: CPT

## 2021-03-09 PROCEDURE — 86304 IMMUNOASSAY TUMOR CA 125: CPT

## 2021-03-09 PROCEDURE — 87086 URINE CULTURE/COLONY COUNT: CPT

## 2021-03-09 PROCEDURE — 85025 COMPLETE CBC W/AUTO DIFF WBC: CPT

## 2021-03-09 PROCEDURE — 74011250636 HC RX REV CODE- 250/636: Performed by: OBSTETRICS & GYNECOLOGY

## 2021-03-09 PROCEDURE — 81003 URINALYSIS AUTO W/O SCOPE: CPT

## 2021-03-09 RX ORDER — HEPARIN 100 UNIT/ML
500 SYRINGE INTRAVENOUS ONCE
Status: COMPLETED | OUTPATIENT
Start: 2021-03-09 | End: 2021-03-09

## 2021-03-09 RX ORDER — OXYCODONE AND ACETAMINOPHEN 2.5; 325 MG/1; MG/1
1 TABLET ORAL
COMMUNITY
End: 2021-03-31 | Stop reason: DRUGHIGH

## 2021-03-09 RX ORDER — OXYCODONE AND ACETAMINOPHEN 5; 325 MG/1; MG/1
1 TABLET ORAL
Qty: 15 TAB | Refills: 0 | Status: SHIPPED | OUTPATIENT
Start: 2021-03-09 | End: 2021-03-22 | Stop reason: SDUPTHER

## 2021-03-09 RX ORDER — SODIUM CHLORIDE 0.9 % (FLUSH) 0.9 %
10 SYRINGE (ML) INJECTION AS NEEDED
Status: DISCONTINUED | OUTPATIENT
Start: 2021-03-09 | End: 2021-03-11 | Stop reason: HOSPADM

## 2021-03-09 RX ORDER — HEPARIN 100 UNIT/ML
SYRINGE INTRAVENOUS
Status: DISPENSED
Start: 2021-03-09 | End: 2021-03-09

## 2021-03-09 RX ADMIN — Medication 10 ML: at 09:15

## 2021-03-09 RX ADMIN — Medication 10 ML: at 09:12

## 2021-03-09 RX ADMIN — HEPARIN 500 UNITS: 100 SYRINGE at 09:16

## 2021-03-09 RX ADMIN — Medication 10 ML: at 09:11

## 2021-03-09 NOTE — PROGRESS NOTES
[facility] Progress Note    Date: 2021    Name: Isaac Gorman    MRN: [id number]         : 1980    Pre-chemo labs    Ms. Ryan Munoz was assessed and education was provided. States only symptom was \" mouth sores\" Dr. Paulina Medrano prescribed moth rinse that has assisted in the healing . Pt denies any questions or concerns pertaining to tomorrows chemo. Ms. Enrique's vitals were reviewed and patient was observed for 5 minutes prior to treatment. Visit Vitals  /83 (BP 1 Location: Left arm, BP Patient Position: Sitting)   Pulse (!) 103   Temp 97.3 °F (36.3 °C)   Resp 16   Ht 5' 7\" (1.702 m)   Wt 60.7 kg (133 lb 14.4 oz)   SpO2 96%   Breastfeeding No   BMI 20.97 kg/m²         LCWMP accessed per protocol with 20 g x 0.75\" , flushes easily with brisk blood return  . Labs obtained per policy. Flushed with NS and heparin. De-accessed. Pt declines bandaid. Lab results were obtained and reviewed. Recent Results (from the past 12 hour(s))   CBC WITH AUTOMATED DIFF    Collection Time: 21  9:19 AM   Result Value Ref Range    WBC 7.1 4.6 - 13.2 K/uL    RBC 4.36 4.20 - 5.30 M/uL    HGB 9.6 (L) 12.0 - 16.0 g/dL    HCT 34.3 (L) 35.0 - 45.0 %    MCV 78.7 74.0 - 97.0 FL    MCH 22.0 (L) 24.0 - 34.0 PG    MCHC 28.0 (L) 31.0 - 37.0 g/dL    RDW 25.3 (H) 11.6 - 14.5 %    PLATELET 196 546 - 981 K/uL    MPV 10.6 9.2 - 11.8 FL    NEUTROPHILS 66 40 - 73 %    LYMPHOCYTES 24 21 - 52 %    MONOCYTES 9 3 - 10 %    EOSINOPHILS 1 0 - 5 %    BASOPHILS 0 0 - 2 %    ABS. NEUTROPHILS 4.7 1.8 - 8.0 K/UL    ABS. LYMPHOCYTES 1.7 0.9 - 3.6 K/UL    ABS. MONOCYTES 0.6 0.05 - 1.2 K/UL    ABS. EOSINOPHILS 0.1 0.0 - 0.4 K/UL    ABS.  BASOPHILS 0.0 0.0 - 0.1 K/UL    DF AUTOMATED     METABOLIC PANEL, COMPREHENSIVE    Collection Time: 21  9:19 AM   Result Value Ref Range    Sodium 140 136 - 145 mmol/L    Potassium 4.0 3.5 - 5.5 mmol/L    Chloride 110 100 - 111 mmol/L    CO2 26 21 - 32 mmol/L    Anion gap 4 3.0 - 18 mmol/L Glucose 87 74 - 99 mg/dL    BUN 11 7.0 - 18 MG/DL    Creatinine 0.78 0.6 - 1.3 MG/DL    BUN/Creatinine ratio 14 12 - 20      GFR est AA >60 >60 ml/min/1.73m2    GFR est non-AA >60 >60 ml/min/1.73m2    Calcium 8.8 8.5 - 10.1 MG/DL    Bilirubin, total 0.2 0.2 - 1.0 MG/DL    ALT (SGPT) 18 13 - 56 U/L    AST (SGOT) 11 10 - 38 U/L    Alk. phosphatase 74 45 - 117 U/L    Protein, total 6.7 6.4 - 8.2 g/dL    Albumin 3.6 3.4 - 5.0 g/dL    Globulin 3.1 2.0 - 4.0 g/dL    A-G Ratio 1.2 0.8 - 1.7     MAGNESIUM    Collection Time: 03/09/21  9:19 AM   Result Value Ref Range    Magnesium 2.3 1.6 - 2.6 mg/dL   URINALYSIS W/ RFLX MICROSCOPIC    Collection Time: 03/09/21  9:25 AM   Result Value Ref Range    Color DARK YELLOW      Appearance CLEAR      Specific gravity 1.021 1.005 - 1.030      pH (UA) 5.0 5.0 - 8.0      Protein Negative NEG mg/dL    Glucose Negative NEG mg/dL    Ketone TRACE (A) NEG mg/dL    Bilirubin Negative NEG      Blood Negative NEG      Urobilinogen 0.2 0.2 - 1.0 EU/dL    Nitrites Negative NEG      Leukocyte Esterase Negative NEG        see [facility] care for ca125    Ms. Enrique tolerated the infusion, and had no complaints. Patient armband removed and shredded. Ms. Aurora Reeves was discharged from [place] [address] in stable condition at 0930.  She is to return on 3/10/2021 at 0800 for her next appointment for chemo    Arslan Ball RN  March 9, 2021

## 2021-03-09 NOTE — LETTER
NOTIFICATION OF RETURN TO WORK / SCHOOL 
 
3/9/2021 9:56 AM 
 
Ms. Lenka Tilley [address] [place] [id number] Lucero Bettencourt To Whom It May Concern: 
 
Lenka Tilley was under the care of Goldy Funk on 3/9/2021. She will be able to return to work/school on 4/30/2021 with no restrictions. If there are questions or concerns please have the patient contact our office. Sincerely, Aristides Bynum MD

## 2021-03-09 NOTE — PROGRESS NOTES
Kaylene Swann is a 39 y.o. female presents in office for endometrial cancer. Chief Complaint   Patient presents with    Follow-up     chemo fu      Pt c/o full body aches and pains, requests refill of pain medication. Pt c/o mouth sores, has magic mouthwash. Pt c/o pain on left side near port, believes she twisted the wrong way in her sleep. Pain is improved with bra use. Pt c/o insomnia, not helped with Ativan. Ambien reported to help the first night used but not since. 2  Do you have any unusual vaginal bleeding, discharge or irritation? No  Do you have any changes in your bowel movements? Pt c/o constipation, treated with Sennakot. Have you been experiencing nausea or vomiting? Pt c/o nausea, treated with Zofran. Have you been experiencing any continuous or worsening abdominal pain? No  Any urinary burning? No    Visit Vitals  /83 (BP 1 Location: Left upper arm, BP Patient Position: Sitting)   Pulse (!) 103   Temp 97.3 °F (36.3 °C) (Temporal)   Ht 5' 7\" (1.702 m)   Wt 60.3 kg (133 lb)   SpO2 96%   BMI 20.83 kg/m²         1. Have you been to the ER, urgent care clinic since your last visit? Hospitalized since your last visit? No    2. Have you seen or consulted any other health care providers outside of the [address] [place] [address] since your last visit? Include any pap smears or colon screening.  No    3 most recent PHQ Screens 1/29/2021   Little interest or pleasure in doing things Not at all   Feeling down, depressed, irritable, or hopeless Not at all   Total Score PHQ 2 0       Learning Assessment 3/9/2021   PRIMARY LEARNER Patient   BARRIERS PRIMARY LEARNER NONE   CO-LEARNER CAREGIVER No   PRIMARY LANGUAGE ENGLISH   LEARNER PREFERENCE PRIMARY DEMONSTRATION   ANSWERED BY Patient   RELATIONSHIP SELF

## 2021-03-09 NOTE — PROGRESS NOTES
[address], [address], [address] [facility]  [address] [facility], [address] [place] [address], [address] [place] [address]   [phone]  Lilibeth Vyas DO        Patient ID:  Name: Katherine Morel  MRM: [id number]  : 1980/41 y.o. Date: 3/9/2021    SUBJECTIVE:    This is a 39 y.o.  female who presents s/p Robotic-assisted total laparoscopic hysterectomy, bilateral salpingo-oophorectomy, upper vaginectomy; bilateral pelvic and para-aortic lymph node dissection; peritoneal biopsies and washings; cystoscopy. on 2021  S/p cycle #1 of Carbo/taxol on 2021. Having generalized body aches. Not sleeping with ativan or ambien. Having numbness in feet that effects her sleep. Appetite improved with megace. Component      Latest Ref Rng & Units 2021          11:45 AM   CA-125      1.5 - 35.0 U/mL 31       Her pathology revealed      A: CERVIX, UTERUS, BILATERAL FALLOPIAN TUBES, AND UPPER VAGINA, TOTAL HYSTERECTOMY WITH BILATERAL SALPINGO-OOPHORECTOMY AND PARTIAL VAGINECTOMY:   ENDOMETRIAL ENDOMETRIOID ADENOCARCINOMA, FIGO GRADE 1, WITH EXTENSIVE INVASION INTO THE CERVIX AND UPPER VAGINA (PLEASE SEE COMMENTS AND SYNOPTIC REPORT). SURGICAL MARGINS NEGATIVE. BENIGN BILATERAL FALLOPIAN TUBES AND OVARIES. B: PELVIC ADHESIONS, BIOPSY:   BENIGN FIBROUS TISSUE. NEGATIVE FOR MALIGNANCY. C: RIGHT PELVIC SIDEWALL PERITONEUM, BIOPSY:   ADENOCARCINOMA. D: RECTOSIGMOID EPIPLOICAE ADHESION, BIOPSY:   ADENOCARCINOMA. E: VAGINA, BIOPSY:   ACUTE INFLAMMATORY TISSUE. NO CARCINOMA SEEN. F: RIGHT PELVIC LYMPH NODE, DISSECTION:   THIRTEEN (13) BENIGN LYMPH NODES. G: LEFT PELVIC LYMPH NODE, DISSECTION:   EIGHT (8) BENIGN LYMPH NODES. H: RIGHT PARA-AORTIC LYMPH NODE, EXCISION:   ONE (1) BENIGN LYMPH NODE. I: LEFT PARA-AORTIC LYMPH NODE, EXCISION:   BENIGN FIBROADIPOSE TISSUE. NO LYMPHOID TISSUE SEEN.    DIAGNOSIS COMMENT:   The intraoperative consultation (specimen A) is corroborated. Immunohistochemistry results for DNA mismatch repair proteins will be reported as an addendum. ENDOMETRIUM   SPECIMEN   Procedure: Total hysterectomy and bilateral salpingo-oophorectomy   TUMOR   Histologic Type: Endometrioid carcinoma, NOS   Histologic Grade: FIGO grade 1   Myometrial Invasion: Present   Depth of Myometrial Invasion (Millimeters): 6 mm   Myometrial Thickness (Millimeters): 28 mm   Percentage of Myometrial Invasion: 21 %   Uterine Serosa Involvement: Not identified   Lower Uterine Segment Involvement: Present, myoinvasive   Cervical Stromal Involvement: Present   Other Tissue / Organ Involvement: Upper Vagina, Pelvic wall, Rectosigmoid adhesion   Peritoneal Ascitic Fluid: Atypical: favor reactive   Lymphovascular Invasion: Not identified   MARGINS   Margins: Ectocervical / Vaginal Cuff Margin: Uninvolved by carcinoma   LYMPH NODES   Lymph Node Status: All lymph nodes negative for tumor cells   Total Number of Pelvic Nodes Examined: 21   Total Number of Para-aortic Nodes Examined: 1   PATHOLOGIC STAGE CLASSIFICATION (pTNM, AJCC 8th Edition)   Primary Tumor (pT): pT3b   Regional Lymph Nodes (pN): pN0   Distant Metastasis (pM): pM1   Specify Site(s): Right Pelvic sidewall peritoneum   GROSS DESCRIPTION:   The specimen is received in nine parts. TEST(S) PERFORMED   Test(s) Performed: Mismatch Repair   Immunohistochemistry (IHC) Testing for Mismatch Repair (MMR)   Proteins: MLH1, MSH2, MSH6, PMS2   MLH1: Intact nuclear expression   MSH2: Loss of nuclear expression   MSH6: Loss of nuclear expression   PMS2: Intact nuclear expression     Medications:     Current Outpatient Medications on File Prior to Visit   Medication Sig Dispense Refill    oxyCODONE-acetaminophen (PERCOCET) 2.5-325 mg per tablet Take 1 Tab by mouth every eight (8) hours as needed for Pain.       LORazepam (Ativan) 0.5 mg tablet Take 1 Tab by mouth nightly as needed for Anxiety. Max Daily Amount: 0.5 mg. 30 Tab 0    magic mouthwash solution Magic mouth wash   Maalox  Lidocaine 2% viscous   Diphenhydramine oral solution     Pharmacy to mix equal portions of ingredients to a total volume as indicated in the dispense amount. Indications: stomatitis, a condition with painful swelling and sores inside the mouth, Swish and spit 5-10 mL PRN every 6 hours for mouth sores. 250 mL 0    ferrous sulfate 300 mg (60 mg iron)/5 mL syrup Take 5 mL by mouth daily for 180 days. 150 mL 5    dexAMETHasone (DECADRON) 4 mg tablet Take 40 mg (10 tablets) the night before Cycle #1 and Cycle #2 chemotherapy treatments. 20 Tab 0    ondansetron hcl (ZOFRAN) 4 mg tablet Take 1 Tab by mouth every eight (8) hours as needed for Nausea or Vomiting. 30 Tab 2    lidocaine-prilocaine (EMLA) topical cream Apply  to affected area as needed for Pain. 30 g 0    megestroL (MEGACE) 400 mg/10 mL (10 mL) suspension Take 10 mL by mouth two (2) times a day. 1 Bottle 3    ondansetron hcl (Zofran) 4 mg tablet Take 1 Tab by mouth every six (6) hours. 30 Tab 0    omeprazole (PriLOSEC OTC) 20 mg tablet Take 20 mg by mouth daily.  mv-min/iron/folic/calcium/vitK (WOMEN'S MULTIVITAMIN PO) Take  by mouth daily.  zolpidem (Ambien) 5 mg tablet Take 1 Tab by mouth nightly as needed for Sleep. Max Daily Amount: 5 mg. 30 Tab 0    promethazine (PHENERGAN) 25 mg tablet Take 1 Tab by mouth every six (6) hours as needed for Nausea. 30 Tab 2    cyclobenzaprine (FLEXERIL) 10 mg tablet Take  by mouth three (3) times daily as needed for Muscle Spasm(s).        Current Facility-Administered Medications on File Prior to Visit   Medication Dose Route Frequency Provider Last Rate Last Admin    heparin (porcine) pf 100 unit/mL             sodium chloride (NS) flush 10 mL  10 mL IntraVENous PRN Valerio Mccarthy MD   10 mL at 03/09/21 0915    [COMPLETED] heparin (porcine) pf 500 Units  500 Units InterCATHeter Morena Phelps MD   500 Units at 03/09/21 8629       Allergies: Allergies   Allergen Reactions    Codeine Hives       OBJECTIVE:    Vitals:   Visit Vitals  /83 (BP 1 Location: Left upper arm, BP Patient Position: Sitting)   Pulse (!) 103   Temp 97.3 °F (36.3 °C) (Temporal)   Ht 5' 7\" (1.702 m)   Wt 60.3 kg (133 lb)   LMP 01/09/2021 (Approximate)   SpO2 96%   BMI 20.83 kg/m²       Physical Examination:    General:  alert, cooperative, no distress   Abdomen: soft, bowel sounds active, non-tender   Incision: healing well   Pelvic: NEFG, spec exam revealed cuff intact, BME revealed cuff intact   Rectal: not done   Extremity:   extremities normal, atraumatic, no cyanosis or edema     IMPRESSION/PLAN:    Stage IIIBG1 endometrial cancer  The operative procedures and clinical results have been reviewed with the patient. Previously Discussed recommendation for adjuvant chemotherapy  Plan for carbo auc=6, taxol 175 mg/m2 IV every 3 wks x 6 cycles. S/p cycle #1  Caris: pending  Invitae: pending  Decreased appetite-improved  Tolerating chemo well. No G3 or G4 toxicity  Chemo induced anemia: Hgb: 8. 3. will monitor. Also taking Iron  Constipation- senokot. Will add colace  Body aches- percocet prn. Insomnia- explained she could take ambien 10 mg qhs  Neuropathy- will monitor but may need dose reduction  Ok for cycle #2  F/u prior to cycle #3    Total time spent was 40 minutes regarding diagnosis of endometrial cancer and >50% of this time was spent counseling and coordinating care.   Munira Don DO  Gynecologic Oncology  3/9/2021/12:59 PM

## 2021-03-10 ENCOUNTER — HOSPITAL ENCOUNTER (OUTPATIENT)
Dept: INFUSION THERAPY | Age: 41
Discharge: HOME | End: 2021-03-10
Payer: COMMERCIAL

## 2021-03-10 ENCOUNTER — TELEPHONE (OUTPATIENT)
Dept: ONCOLOGY | Age: 41
End: 2021-03-10

## 2021-03-10 ENCOUNTER — DOCUMENTATION ONLY (OUTPATIENT)
Dept: ONCOLOGY | Age: 41
End: 2021-03-10

## 2021-03-10 VITALS
HEART RATE: 132 BPM | RESPIRATION RATE: 16 BRPM | OXYGEN SATURATION: 98 % | TEMPERATURE: 97.5 F | SYSTOLIC BLOOD PRESSURE: 105 MMHG | DIASTOLIC BLOOD PRESSURE: 74 MMHG

## 2021-03-10 DIAGNOSIS — C54.1 ENDOMETRIAL CANCER (HCC): Primary | ICD-10-CM

## 2021-03-10 LAB
BACTERIA SPEC CULT: NORMAL
SERVICE CMNT-IMP: NORMAL

## 2021-03-10 PROCEDURE — 96415 CHEMO IV INFUSION ADDL HR: CPT

## 2021-03-10 PROCEDURE — 96367 TX/PROPH/DG ADDL SEQ IV INF: CPT

## 2021-03-10 PROCEDURE — 77030012965 HC NDL HUBR BBMI -A

## 2021-03-10 PROCEDURE — 74011250636 HC RX REV CODE- 250/636: Performed by: OBSTETRICS & GYNECOLOGY

## 2021-03-10 PROCEDURE — 74011000258 HC RX REV CODE- 258: Performed by: OBSTETRICS & GYNECOLOGY

## 2021-03-10 PROCEDURE — 96375 TX/PRO/DX INJ NEW DRUG ADDON: CPT

## 2021-03-10 PROCEDURE — 96417 CHEMO IV INFUS EACH ADDL SEQ: CPT

## 2021-03-10 PROCEDURE — 96413 CHEMO IV INFUSION 1 HR: CPT

## 2021-03-10 PROCEDURE — 74011000250 HC RX REV CODE- 250: Performed by: OBSTETRICS & GYNECOLOGY

## 2021-03-10 RX ORDER — SODIUM CHLORIDE 9 MG/ML
10 INJECTION INTRAMUSCULAR; INTRAVENOUS; SUBCUTANEOUS AS NEEDED
Status: ACTIVE | OUTPATIENT
Start: 2021-03-10 | End: 2021-03-10

## 2021-03-10 RX ORDER — SODIUM CHLORIDE 0.9 % (FLUSH) 0.9 %
10 SYRINGE (ML) INJECTION AS NEEDED
Status: DISPENSED | OUTPATIENT
Start: 2021-03-10 | End: 2021-03-10

## 2021-03-10 RX ORDER — SODIUM CHLORIDE 9 MG/ML
25 INJECTION, SOLUTION INTRAVENOUS CONTINUOUS
Status: DISPENSED | OUTPATIENT
Start: 2021-03-10 | End: 2021-03-10

## 2021-03-10 RX ORDER — DIPHENHYDRAMINE HYDROCHLORIDE 50 MG/ML
50 INJECTION, SOLUTION INTRAMUSCULAR; INTRAVENOUS ONCE
Status: COMPLETED | OUTPATIENT
Start: 2021-03-10 | End: 2021-03-10

## 2021-03-10 RX ORDER — HEPARIN 100 UNIT/ML
300-500 SYRINGE INTRAVENOUS AS NEEDED
Status: ACTIVE | OUTPATIENT
Start: 2021-03-10 | End: 2021-03-10

## 2021-03-10 RX ADMIN — SODIUM CHLORIDE 25 ML/HR: 0.9 INJECTION, SOLUTION INTRAVENOUS at 08:19

## 2021-03-10 RX ADMIN — CARBOPLATIN 700 MG: 10 INJECTION, SOLUTION INTRAVENOUS at 13:00

## 2021-03-10 RX ADMIN — SODIUM CHLORIDE 150 MG: 900 INJECTION, SOLUTION INTRAVENOUS at 08:34

## 2021-03-10 RX ADMIN — DIPHENHYDRAMINE HYDROCHLORIDE 50 MG: 50 INJECTION, SOLUTION INTRAMUSCULAR; INTRAVENOUS at 09:40

## 2021-03-10 RX ADMIN — SODIUM CHLORIDE 20 MG: 9 INJECTION INTRAMUSCULAR; INTRAVENOUS; SUBCUTANEOUS at 09:50

## 2021-03-10 RX ADMIN — ONDANSETRON: 2 INJECTION INTRAMUSCULAR; INTRAVENOUS at 09:11

## 2021-03-10 RX ADMIN — SODIUM CHLORIDE 288 MG: 9 INJECTION, SOLUTION INTRAVENOUS at 10:09

## 2021-03-10 RX ADMIN — HEPARIN 500 UNITS: 100 SYRINGE at 14:17

## 2021-03-10 NOTE — PROGRESS NOTES
[facility] Progress Note    Date: March 10, 2021    Name: Rosa Palafox    MRN: [id number]         : 1980      Ms. Enrique arrived to [facility] at 0800, ambulatory by self  (due to [employer], visitors are currently prohibited)  Pt is here today for Taxol/carbo , Cycle 2, Day 1. Reviewed Ca125 result with patient, states she saw \" all other labs on-line and has no questions. \"  Reassured patient that labs are stable for chemo today. Ms. Sinha was assessed and education was provided. Ms. Enrique's vitals were reviewed. Visit Vitals  /74 (BP 1 Location: Left arm, BP Patient Position: Sitting)   Pulse (!) 132   Temp 97.5 °F (36.4 °C)   Resp 16   SpO2 98%       Patient's left upper chest port accessed using a 20 g roldan needle. Secured site with tegaderm, and applied biopatch per patients request.  500 ml normal saline started at 75 ml/hr. Pt had comfortable in room,( this included rearranging all the furniture for her comfort). Pre-medications were administered as ordered and chemotherapy was initiated. Emend 150 mg IV, Pt says \" my toes are hurting, I told Dr. Yanick Robertson about it\", explained to patient that Emend does not usually cause toe pain, denies it radiating. Administered 50 mg Benadryl IVP, Pt states \" aww that has made it feel better. \"   Encouraged patient to increase fluid intake at home and elevated patients feet. 12mg Decadron mixed with 16 mg Zofran IVP, and Pepcid 20 mg IVP,  Pt tolerated well and says \" feet are no longer hurting\"       Taxol 288 mg started after being verified by 2nd nurse, blood return re-verified, dosage is within 10% weight perimeter. At [id number]; Pt says \" my toes are hurting, I want some pain medication now or I am leaving. \" Pt describes pain as a \" burning sensation in toe\"  Offered patient some Ativan, pt quickly declines. \" No I want pain medication\" Pt begins to use vulgar language and raising her voice.     Called Dr. Yanick Robertson explained the situation. Dr. Laura Thorne called patient on her cell. Orders received to stop Taxol now, administer Carbo as scheduled. If pharmacy can receive a verbal order for Percocet 325mg oral, administer 2. Spoke with Fidela Litten, PHd, unable to administer Percocet in outpatient setting. Reviewed with patient and encouraged her to bring her home supply next visit. Applied gloves filled with hot water to patient's feet for comfort. Pts behavior more calm and accepting now  700 mg Carbo adminstered over an hour via pump. Ms. Roxi Aleman tolerated infusion without complaints. Port flushed with 10 ml normal saline and 500 units heparin instilled per order and de-accessed. Band-aid applied to site. Jerman Shah RN for Dr. Laura Thorne in patients room offering support and providing information about potentially changing taxol dose per physician. Ms. Roxi Aleman was discharged from [place] [address] in stable condition at [phone]. She is to return on March 30 at 0900 for her next appointment for labs     Pt declines wheelchair to lobby where  is picking her up.     Lena Riggins RN  March 10, 2021

## 2021-03-10 NOTE — PROGRESS NOTES
Saw patient in [facility] for foot pain. Patient states she had pins and needles sensation during premedications, relieved with Benadryl. Pain came back stronger during Taxol treatment. Patient attempted walking to relieve pain without relief. Treatment stopped per Dr. Gilma Yadav. Pt states pain is still 10/10, but is improved from earlier today. Patient finished Carbo treatment and is preparing to leave after treatment.  is in route and has patient's Percocet with for pain.

## 2021-03-11 ENCOUNTER — TELEPHONE (OUTPATIENT)
Dept: ONCOLOGY | Age: 41
End: 2021-03-11

## 2021-03-11 NOTE — TELEPHONE ENCOUNTER
Attempted to get patient on the phone regarding concerns regarding FMLA forms. Hermelinda Huang made patients husbands aware that we did not have any forms or received anything via fax as of today. Patients spouse was not happy about that per Rafael.

## 2021-03-11 NOTE — TELEPHONE ENCOUNTER
Spoke with patient regarding numbness in feet. Pt states numbness in toes is worse with walking. She is using vitamin B 6, vitamin B 12, and percocet.

## 2021-03-15 NOTE — TELEPHONE ENCOUNTER
Contacted patient regarding foot pain. She states that she felt like she was hit by a truck on Saturday, pain has improved. Patient states she is still having severe foot pain and difficulty walking, but symptoms have slightly improved. C/o full body aches. Pt is using Percocet before bed to sleep and tylenol during the day for pain.

## 2021-03-16 ENCOUNTER — APPOINTMENT (OUTPATIENT)
Dept: INFUSION THERAPY | Age: 41
End: 2021-03-16
Payer: COMMERCIAL

## 2021-03-16 DIAGNOSIS — F51.01 PRIMARY INSOMNIA: ICD-10-CM

## 2021-03-18 RX ORDER — ZOLPIDEM TARTRATE 5 MG/1
5 TABLET ORAL
Qty: 30 TAB | Refills: 0 | Status: SHIPPED | OUTPATIENT
Start: 2021-03-18 | End: 2021-06-01 | Stop reason: DRUGHIGH

## 2021-03-22 DIAGNOSIS — R10.2 PELVIC PAIN: ICD-10-CM

## 2021-03-22 RX ORDER — ONDANSETRON 4 MG/1
4 TABLET, FILM COATED ORAL
Qty: 30 TAB | Refills: 2 | Status: SHIPPED | OUTPATIENT
Start: 2021-03-22 | End: 2021-04-25 | Stop reason: SDUPTHER

## 2021-03-22 RX ORDER — OXYCODONE AND ACETAMINOPHEN 5; 325 MG/1; MG/1
1 TABLET ORAL
Qty: 15 TAB | Refills: 0 | Status: SHIPPED | OUTPATIENT
Start: 2021-03-22 | End: 2021-03-29

## 2021-03-23 ENCOUNTER — APPOINTMENT (OUTPATIENT)
Dept: INFUSION THERAPY | Age: 41
End: 2021-03-23

## 2021-03-24 RX ORDER — ACETAMINOPHEN 325 MG/1
650 TABLET ORAL AS NEEDED
Status: CANCELLED
Start: 2021-03-31

## 2021-03-24 RX ORDER — DIPHENHYDRAMINE HYDROCHLORIDE 50 MG/ML
25 INJECTION, SOLUTION INTRAMUSCULAR; INTRAVENOUS AS NEEDED
Status: CANCELLED
Start: 2021-03-31

## 2021-03-24 RX ORDER — POTASSIUM CHLORIDE 7.45 MG/ML
10 INJECTION INTRAVENOUS
Status: CANCELLED
Start: 2021-03-31

## 2021-03-24 RX ORDER — HYDROCORTISONE SODIUM SUCCINATE 100 MG/2ML
100 INJECTION, POWDER, FOR SOLUTION INTRAMUSCULAR; INTRAVENOUS AS NEEDED
Status: CANCELLED | OUTPATIENT
Start: 2021-03-31

## 2021-03-24 RX ORDER — PROCHLORPERAZINE EDISYLATE 5 MG/ML
10 INJECTION INTRAMUSCULAR; INTRAVENOUS
Status: CANCELLED
Start: 2021-03-31

## 2021-03-24 RX ORDER — MAGNESIUM SULFATE HEPTAHYDRATE 40 MG/ML
2 INJECTION, SOLUTION INTRAVENOUS
Status: CANCELLED
Start: 2021-03-31

## 2021-03-24 RX ORDER — EPINEPHRINE 1 MG/ML
0.3 INJECTION, SOLUTION, CONCENTRATE INTRAVENOUS AS NEEDED
Status: CANCELLED | OUTPATIENT
Start: 2021-03-31

## 2021-03-24 RX ORDER — ONDANSETRON 2 MG/ML
8 INJECTION INTRAMUSCULAR; INTRAVENOUS AS NEEDED
Status: CANCELLED | OUTPATIENT
Start: 2021-03-31

## 2021-03-24 RX ORDER — DIPHENHYDRAMINE HYDROCHLORIDE 50 MG/ML
50 INJECTION, SOLUTION INTRAMUSCULAR; INTRAVENOUS AS NEEDED
Status: CANCELLED
Start: 2021-03-31

## 2021-03-24 RX ORDER — ALBUTEROL SULFATE 0.83 MG/ML
2.5 SOLUTION RESPIRATORY (INHALATION) AS NEEDED
Status: CANCELLED
Start: 2021-03-31

## 2021-03-30 ENCOUNTER — OFFICE VISIT (OUTPATIENT)
Dept: ONCOLOGY | Age: 41
End: 2021-03-30
Payer: COMMERCIAL

## 2021-03-30 ENCOUNTER — HOSPITAL ENCOUNTER (OUTPATIENT)
Dept: INFUSION THERAPY | Age: 41
Discharge: HOME | End: 2021-03-30
Payer: COMMERCIAL

## 2021-03-30 VITALS
BODY MASS INDEX: 22.27 KG/M2 | SYSTOLIC BLOOD PRESSURE: 118 MMHG | DIASTOLIC BLOOD PRESSURE: 92 MMHG | OXYGEN SATURATION: 99 % | HEART RATE: 109 BPM | RESPIRATION RATE: 16 BRPM | TEMPERATURE: 97.1 F | HEIGHT: 67 IN | WEIGHT: 141.9 LBS

## 2021-03-30 VITALS
WEIGHT: 140.8 LBS | SYSTOLIC BLOOD PRESSURE: 118 MMHG | RESPIRATION RATE: 16 BRPM | TEMPERATURE: 97.1 F | HEIGHT: 67 IN | OXYGEN SATURATION: 99 % | BODY MASS INDEX: 22.1 KG/M2 | HEART RATE: 109 BPM | DIASTOLIC BLOOD PRESSURE: 92 MMHG

## 2021-03-30 DIAGNOSIS — C54.1 ENDOMETRIAL CANCER (HCC): ICD-10-CM

## 2021-03-30 DIAGNOSIS — F51.01 PRIMARY INSOMNIA: Primary | ICD-10-CM

## 2021-03-30 DIAGNOSIS — G62.9 NEUROPATHY: ICD-10-CM

## 2021-03-30 LAB
ALBUMIN SERPL-MCNC: 3.7 G/DL (ref 3.4–5)
ALBUMIN/GLOB SERPL: 1.2 {RATIO} (ref 0.8–1.7)
ALP SERPL-CCNC: 78 U/L (ref 45–117)
ALT SERPL-CCNC: 21 U/L (ref 13–56)
ANION GAP SERPL CALC-SCNC: 6 MMOL/L (ref 3–18)
APPEARANCE UR: CLEAR
AST SERPL-CCNC: 13 U/L (ref 10–38)
BASO+EOS+MONOS # BLD AUTO: 0.4 K/UL (ref 0–2.3)
BASO+EOS+MONOS NFR BLD AUTO: 8 % (ref 0.1–17)
BILIRUB SERPL-MCNC: 0.2 MG/DL (ref 0.2–1)
BILIRUB UR QL: NEGATIVE
BUN SERPL-MCNC: 11 MG/DL (ref 7–18)
BUN/CREAT SERPL: 13 (ref 12–20)
CALCIUM SERPL-MCNC: 9.2 MG/DL (ref 8.5–10.1)
CHLORIDE SERPL-SCNC: 107 MMOL/L (ref 100–111)
CO2 SERPL-SCNC: 26 MMOL/L (ref 21–32)
COLOR UR: YELLOW
CREAT SERPL-MCNC: 0.87 MG/DL (ref 0.6–1.3)
DIFFERENTIAL METHOD BLD: ABNORMAL
ERYTHROCYTE [DISTWIDTH] IN BLOOD BY AUTOMATED COUNT: 26.1 % (ref 11.5–14.5)
GLOBULIN SER CALC-MCNC: 3.1 G/DL (ref 2–4)
GLUCOSE SERPL-MCNC: 116 MG/DL (ref 74–99)
GLUCOSE UR STRIP.AUTO-MCNC: NEGATIVE MG/DL
HCT VFR BLD AUTO: 36.6 % (ref 36–48)
HGB BLD-MCNC: 11.2 G/DL (ref 12–16)
HGB UR QL STRIP: NEGATIVE
KETONES UR QL STRIP.AUTO: NEGATIVE MG/DL
LEUKOCYTE ESTERASE UR QL STRIP.AUTO: NEGATIVE
LYMPHOCYTES # BLD: 1.6 K/UL (ref 1.1–5.9)
LYMPHOCYTES NFR BLD: 29 % (ref 14–44)
MAGNESIUM SERPL-MCNC: 1.9 MG/DL (ref 1.6–2.6)
MCH RBC QN AUTO: 25.8 PG (ref 25–35)
MCHC RBC AUTO-ENTMCNC: 30.6 G/DL (ref 31–37)
MCV RBC AUTO: 84.3 FL (ref 78–102)
NEUTS SEG # BLD: 3.5 K/UL (ref 1.8–9.5)
NEUTS SEG NFR BLD: 64 % (ref 40–70)
NITRITE UR QL STRIP.AUTO: NEGATIVE
PH UR STRIP: 6 [PH] (ref 5–8)
PLATELET # BLD AUTO: 129 K/UL (ref 140–440)
POTASSIUM SERPL-SCNC: 4 MMOL/L (ref 3.5–5.5)
PROT SERPL-MCNC: 6.8 G/DL (ref 6.4–8.2)
PROT UR STRIP-MCNC: NEGATIVE MG/DL
RBC # BLD AUTO: 4.34 M/UL (ref 4.1–5.1)
SODIUM SERPL-SCNC: 139 MMOL/L (ref 136–145)
SP GR UR REFRACTOMETRY: 1.02 (ref 1–1.03)
UROBILINOGEN UR QL STRIP.AUTO: 0.2 EU/DL (ref 0.2–1)
WBC # BLD AUTO: 5.5 K/UL (ref 4.5–13)

## 2021-03-30 PROCEDURE — 87086 URINE CULTURE/COLONY COUNT: CPT

## 2021-03-30 PROCEDURE — 36591 DRAW BLOOD OFF VENOUS DEVICE: CPT

## 2021-03-30 PROCEDURE — 86304 IMMUNOASSAY TUMOR CA 125: CPT

## 2021-03-30 PROCEDURE — 77030012965 HC NDL HUBR BBMI -A

## 2021-03-30 PROCEDURE — 83735 ASSAY OF MAGNESIUM: CPT

## 2021-03-30 PROCEDURE — 85025 COMPLETE CBC W/AUTO DIFF WBC: CPT

## 2021-03-30 PROCEDURE — 80053 COMPREHEN METABOLIC PANEL: CPT

## 2021-03-30 PROCEDURE — 99215 OFFICE O/P EST HI 40 MIN: CPT | Performed by: OBSTETRICS & GYNECOLOGY

## 2021-03-30 PROCEDURE — 81003 URINALYSIS AUTO W/O SCOPE: CPT

## 2021-03-30 PROCEDURE — 74011250636 HC RX REV CODE- 250/636

## 2021-03-30 RX ORDER — PREGABALIN 50 MG/1
50 CAPSULE ORAL 2 TIMES DAILY
Qty: 60 CAP | Refills: 3 | Status: SHIPPED | OUTPATIENT
Start: 2021-03-30 | End: 2021-06-01 | Stop reason: SDUPTHER

## 2021-03-30 RX ORDER — HEPARIN 100 UNIT/ML
500 SYRINGE INTRAVENOUS ONCE
Status: ACTIVE | OUTPATIENT
Start: 2021-03-30 | End: 2021-03-30

## 2021-03-30 RX ORDER — ZOLPIDEM TARTRATE 10 MG/1
10 TABLET ORAL
Qty: 30 TAB | Refills: 0 | Status: SHIPPED | OUTPATIENT
Start: 2021-03-30 | End: 2021-04-28 | Stop reason: SDUPTHER

## 2021-03-30 RX ORDER — HEPARIN 100 UNIT/ML
SYRINGE INTRAVENOUS
Status: COMPLETED
Start: 2021-03-30 | End: 2021-03-30

## 2021-03-30 RX ORDER — SODIUM CHLORIDE 0.9 % (FLUSH) 0.9 %
10 SYRINGE (ML) INJECTION AS NEEDED
Status: DISCONTINUED | OUTPATIENT
Start: 2021-03-30 | End: 2021-04-01 | Stop reason: HOSPADM

## 2021-03-30 RX ADMIN — Medication 10 ML: at 08:58

## 2021-03-30 RX ADMIN — HEPARIN 500 UNITS: 100 SYRINGE at 09:11

## 2021-03-30 RX ADMIN — Medication 10 ML: at 09:05

## 2021-03-30 RX ADMIN — Medication 10 ML: at 09:10

## 2021-03-30 RX ADMIN — Medication 10 ML: at 08:57

## 2021-03-30 RX ADMIN — Medication 10 ML: at 09:00

## 2021-03-30 NOTE — PROGRESS NOTES
Anthony Peters is a 39 y.o. female presents in office for endometrial cancer. Chief Complaint   Patient presents with    Uterine Cancer        Pt c/o full body aches and pains, worsened after this treatment. Pain worse in legs with one bruise on inside of left leg. Pt c/o severe foot pain with chemo treatment, pt requests prescription to help with neuropathy. Pt c/o occasional mouth sores, has magic mouthwash. Pt c/o insomnia, helped with Ambien 10 mg, pt requests refill. Pt c/o fatigue, lasted until yesterday. Pt c/o 2 episodes ringing in ears after dental work, resolved. Pt denies headache, dizziness, decreased appetite. Do you have any unusual vaginal bleeding, discharge or irritation? No  Do you have any changes in your bowel movements? Pt c/o constipation, treated with Colace. Have you been experiencing nausea or vomiting? Pt c/o nausea, treated with Zofran. Have you been experiencing any continuous or worsening abdominal pain? Pt c/o rib pain after treatment, now resolved. Any urinary burning? No    Visit Vitals  Ht 5' 7\" (1.702 m)   Wt 63.9 kg (140 lb 12.8 oz)   BMI 22.05 kg/m²         1. Have you been to the ER, urgent care clinic since your last visit? Hospitalized since your last visit? No    2. Have you seen or consulted any other health care providers outside of the [address] [place] [address] since your last visit? Include any pap smears or colon screening.  No    3 most recent PHQ Screens 1/29/2021   Little interest or pleasure in doing things Not at all   Feeling down, depressed, irritable, or hopeless Not at all   Total Score PHQ 2 0       Learning Assessment 3/9/2021   PRIMARY LEARNER Patient   BARRIERS PRIMARY LEARNER NONE   CO-LEARNER CAREGIVER No   PRIMARY LANGUAGE ENGLISH   LEARNER PREFERENCE PRIMARY DEMONSTRATION   ANSWERED BY Patient   RELATIONSHIP SELF

## 2021-03-30 NOTE — PROGRESS NOTES
[facility] Progress Note    Date: 2021    Name: Mora Salmeron    MRN: [id number]         : 1980    Pre-chemo labs    Ms. Sinha was assessed and education was provided. Pt says she is having pain/numbness in her feet but not hands. States \" I want my chemo lowered. \"  Pt has an appointment with Dr. Syed Davila immediately after todays labs. Advised her to discuss with him at her appointment, verbalized understanding. Pt denies any questions or concerns pertaining to tomorrows chemo. Ms. Enrique's vitals were reviewed and patient was observed for 5 minutes prior to treatment. Visit Vitals  BP (!) 118/92 (BP 1 Location: Left arm, BP Patient Position: Sitting)   Pulse (!) 109   Temp 97.1 °F (36.2 °C)   Resp 16   Ht 5' 7\" (1.702 m)   Wt 64.4 kg (141 lb 14.4 oz)   SpO2 99%   BMI 22.22 kg/m²         LCWMP accessed per protocol with 20 g x 0.75\" , flushes easily with brisk blood return verified after laying patient in a suspine position. Labs obtained per policy. Flushed with 10 NS and 500 units heparin instilled   Bandaid applied. Lab results were obtained and reviewed. Recent Results (from the past 12 hour(s))   CBC WITH 3 PART DIFF    Collection Time: 21  9:00 AM   Result Value Ref Range    WBC 5.5 4.5 - 13.0 K/uL    RBC 4.34 4.10 - 5.10 M/uL    HGB 11.2 (L) 12.0 - 16.0 g/dL    HCT 36.6 36 - 48 %    MCV 84.3 78 - 102 FL    MCH 25.8 25.0 - 35.0 PG    MCHC 30.6 (L) 31 - 37 g/dL    RDW 26.1 (H) 11.5 - 14.5 %    PLATELET 687 (L) 444 - 440 K/uL    NEUTROPHILS 64 40 - 70 %    MIXED CELLS 8 0.1 - 17 %    LYMPHOCYTES 29 14 - 44 %    ABS. NEUTROPHILS 3.5 1.8 - 9.5 K/UL    ABS. MIXED CELLS 0.4 0.0 - 2.3 K/uL    ABS.  LYMPHOCYTES 1.6 1.1 - 5.9 K/UL    DF AUTOMATED     METABOLIC PANEL, COMPREHENSIVE    Collection Time: 21  9:00 AM   Result Value Ref Range    Sodium 139 136 - 145 mmol/L    Potassium 4.0 3.5 - 5.5 mmol/L    Chloride 107 100 - 111 mmol/L    CO2 26 21 - 32 mmol/L    Anion gap 6 3.0 - 18 mmol/L    Glucose 116 (H) 74 - 99 mg/dL    BUN 11 7.0 - 18 MG/DL    Creatinine 0.87 0.6 - 1.3 MG/DL    BUN/Creatinine ratio 13 12 - 20      GFR est AA >60 >60 ml/min/1.73m2    GFR est non-AA >60 >60 ml/min/1.73m2    Calcium 9.2 8.5 - 10.1 MG/DL    Bilirubin, total 0.2 0.2 - 1.0 MG/DL    ALT (SGPT) 21 13 - 56 U/L    AST (SGOT) 13 10 - 38 U/L    Alk. phosphatase 78 45 - 117 U/L    Protein, total 6.8 6.4 - 8.2 g/dL    Albumin 3.7 3.4 - 5.0 g/dL    Globulin 3.1 2.0 - 4.0 g/dL    A-G Ratio 1.2 0.8 - 1.7     MAGNESIUM    Collection Time: 03/30/21  9:00 AM   Result Value Ref Range    Magnesium 1.9 1.6 - 2.6 mg/dL   URINALYSIS W/ RFLX MICROSCOPIC    Collection Time: 03/30/21  9:00 AM   Result Value Ref Range    Color YELLOW      Appearance CLEAR      Specific gravity 1.018 1.005 - 1.030      pH (UA) 6.0 5.0 - 8.0      Protein Negative NEG mg/dL    Glucose Negative NEG mg/dL    Ketone Negative NEG mg/dL    Bilirubin Negative NEG      Blood Negative NEG      Urobilinogen 0.2 0.2 - 1.0 EU/dL    Nitrites Negative NEG      Leukocyte Esterase Negative NEG        see connect care for ca125    Ms. Enrique tolerated the infusion, and had no complaints. Patient armband removed and shredded. Ms. Sinha was discharged from [place] [address] in stable condition at [address] [place] [address].  She is to return on 3/31/2021 at 0800 for her next appointment for chemo [address] [place] [address], RN  March 30, 2021

## 2021-03-30 NOTE — PROGRESS NOTES
[facility]  [address] [place] [address], [address], [facility] [facility]  [address] [facility], [address] [place] [address]  [place], [address]   [phone]  Tone Duff DO        Patient ID:  Name: Ran Lebron  MRM: [id number]  : 1980/41 y.o. Date: 3/30/2021    SUBJECTIVE:    This is a 39 y.o.  female with h/o Stage IIIBG1 endometrial caner s/p Robotic-assisted total laparoscopic hysterectomy, bilateral salpingo-oophorectomy, upper vaginectomy; bilateral pelvic and para-aortic lymph node dissection; peritoneal biopsies and washings; cystoscopy. on 2021  S/p cycle #2 of Carbo/taxol on3/10/2021. Appetite improved and gaining weight. Sleep is better with ambien 10 mg. Still having numbness in toes. Felt like \"hit by [facility] truck\" on days 3-6. Component      Latest Ref Rng & Units 3/9/2021           9:19 AM   CA-125      1.5 - 35.0 U/mL 30     Component      Latest Ref Rng & Units 2021          11:45 AM   CA-125      1.5 - 35.0 U/mL 31       Her pathology revealed      A: CERVIX, UTERUS, BILATERAL FALLOPIAN TUBES, AND UPPER VAGINA, TOTAL HYSTERECTOMY WITH BILATERAL SALPINGO-OOPHORECTOMY AND PARTIAL VAGINECTOMY:   ENDOMETRIAL ENDOMETRIOID ADENOCARCINOMA, FIGO GRADE 1, WITH EXTENSIVE INVASION INTO THE CERVIX AND UPPER VAGINA (PLEASE SEE COMMENTS AND SYNOPTIC REPORT). SURGICAL MARGINS NEGATIVE. BENIGN BILATERAL FALLOPIAN TUBES AND OVARIES. B: PELVIC ADHESIONS, BIOPSY:   BENIGN FIBROUS TISSUE. NEGATIVE FOR MALIGNANCY. C: RIGHT PELVIC SIDEWALL PERITONEUM, BIOPSY:   ADENOCARCINOMA. D: RECTOSIGMOID EPIPLOICAE ADHESION, BIOPSY:   ADENOCARCINOMA. E: VAGINA, BIOPSY:   ACUTE INFLAMMATORY TISSUE. NO CARCINOMA SEEN. F: RIGHT PELVIC LYMPH NODE, DISSECTION:   THIRTEEN (13) BENIGN LYMPH NODES. G: LEFT PELVIC LYMPH NODE, DISSECTION:   EIGHT (8) BENIGN LYMPH NODES.    H: RIGHT PARA-AORTIC LYMPH NODE, EXCISION:   ONE (1) BENIGN LYMPH NODE. I: LEFT PARA-AORTIC LYMPH NODE, EXCISION:   BENIGN FIBROADIPOSE TISSUE. NO LYMPHOID TISSUE SEEN. DIAGNOSIS COMMENT:   The intraoperative consultation (specimen A) is corroborated. Immunohistochemistry results for DNA mismatch repair proteins will be reported as an addendum. ENDOMETRIUM   SPECIMEN   Procedure: Total hysterectomy and bilateral salpingo-oophorectomy   TUMOR   Histologic Type: Endometrioid carcinoma, NOS   Histologic Grade: FIGO grade 1   Myometrial Invasion: Present   Depth of Myometrial Invasion (Millimeters): 6 mm   Myometrial Thickness (Millimeters): 28 mm   Percentage of Myometrial Invasion: 21 %   Uterine Serosa Involvement: Not identified   Lower Uterine Segment Involvement: Present, myoinvasive   Cervical Stromal Involvement: Present   Other Tissue / Organ Involvement: Upper Vagina, Pelvic wall, Rectosigmoid adhesion   Peritoneal Ascitic Fluid: Atypical: favor reactive   Lymphovascular Invasion: Not identified   MARGINS   Margins: Ectocervical / Vaginal Cuff Margin: Uninvolved by carcinoma   LYMPH NODES   Lymph Node Status: All lymph nodes negative for tumor cells   Total Number of Pelvic Nodes Examined: 21   Total Number of Para-aortic Nodes Examined: 1   PATHOLOGIC STAGE CLASSIFICATION (pTNM, AJCC 8th Edition)   Primary Tumor (pT): pT3b   Regional Lymph Nodes (pN): pN0   Distant Metastasis (pM): pM1   Specify Site(s): Right Pelvic sidewall peritoneum   GROSS DESCRIPTION:   The specimen is received in nine parts.    TEST(S) PERFORMED   Test(s) Performed: Mismatch Repair   Immunohistochemistry (IHC) Testing for Mismatch Repair (MMR)   Proteins: MLH1, MSH2, MSH6, PMS2   MLH1: Intact nuclear expression   MSH2: Loss of nuclear expression   MSH6: Loss of nuclear expression   PMS2: Intact nuclear expression     Medications:     Current Outpatient Medications on File Prior to Visit   Medication Sig Dispense Refill    ondansetron hcl (ZOFRAN) 4 mg tablet Take 1 Tab by mouth every eight (8) hours as needed for Nausea or Vomiting. 30 Tab 2    [] oxyCODONE-acetaminophen (PERCOCET) 5-325 mg per tablet Take 1 Tab by mouth every four (4) hours as needed for Pain for up to 7 days. Max Daily Amount: 6 Tabs. 15 Tab 0    zolpidem (Ambien) 5 mg tablet Take 1 Tab by mouth nightly as needed for Sleep. Max Daily Amount: 5 mg. 30 Tab 0    magic mouthwash solution Magic mouth wash   Maalox  Lidocaine 2% viscous   Diphenhydramine oral solution     Pharmacy to mix equal portions of ingredients to a total volume as indicated in the dispense amount. Indications: stomatitis, a condition with painful swelling and sores inside the mouth, Swish and spit 5-10 mL PRN every 6 hours for mouth sores. 250 mL 0    oxyCODONE-acetaminophen (PERCOCET) 2.5-325 mg per tablet Take 1 Tab by mouth every eight (8) hours as needed for Pain.  LORazepam (Ativan) 0.5 mg tablet Take 1 Tab by mouth nightly as needed for Anxiety. Max Daily Amount: 0.5 mg. 30 Tab 0    ferrous sulfate 300 mg (60 mg iron)/5 mL syrup Take 5 mL by mouth daily for 180 days. 150 mL 5    dexAMETHasone (DECADRON) 4 mg tablet Take 40 mg (10 tablets) the night before Cycle #1 and Cycle #2 chemotherapy treatments. 20 Tab 0    promethazine (PHENERGAN) 25 mg tablet Take 1 Tab by mouth every six (6) hours as needed for Nausea. 30 Tab 2    lidocaine-prilocaine (EMLA) topical cream Apply  to affected area as needed for Pain. 30 g 0    megestroL (MEGACE) 400 mg/10 mL (10 mL) suspension Take 10 mL by mouth two (2) times a day. 1 Bottle 3    ondansetron hcl (Zofran) 4 mg tablet Take 1 Tab by mouth every six (6) hours. 30 Tab 0    omeprazole (PriLOSEC OTC) 20 mg tablet Take 20 mg by mouth daily.  mv-min/iron/folic/calcium/vitK (WOMEN'S MULTIVITAMIN PO) Take  by mouth daily.  cyclobenzaprine (FLEXERIL) 10 mg tablet Take  by mouth three (3) times daily as needed for Muscle Spasm(s).        No current facility-administered medications on file prior to visit. Allergies: Allergies   Allergen Reactions    Codeine Hives       OBJECTIVE:    Vitals:   Visit Vitals  BP (!) 118/92 (BP 1 Location: Left arm, BP Patient Position: Sitting)   Pulse (!) 109   Temp 97.1 °F (36.2 °C) (Temporal)   Resp 16   Ht 5' 7\" (1.702 m)   Wt 63.9 kg (140 lb 12.8 oz)   LMP 01/09/2021 (Approximate)   SpO2 99%   BMI 22.05 kg/m²       Physical Examination:    General:  alert, cooperative, no distress   Abdomen: soft, bowel sounds active, non-tender   Incision: healing well   Pelvic: Deferred    Rectal: not done   Extremity:   extremities normal, atraumatic, no cyanosis or edema     IMPRESSION/PLAN:    Stage IIIBG1 endometrial cancer  The operative procedures and clinical results have been reviewed with the patient. Previously Discussed recommendation for adjuvant chemotherapy  Plan for carbo auc=6, taxol 175 mg/m2 IV every 3 wks x 6 cycles. S/p cycle #2. Dose reduction to 135 mg/m2   Caris: pending  Invitae: MSH2 pathogenic c/w park syndrome. Will refer to GI  Decreased appetite-improved  Tolerating chemo well. Chemo induced anemia: Hgb: 9.6.  will monitor. Also taking Iron  Constipation- senokot, colace-improved  Body aches- percocet prn. Insomnia- explained she could take ambien 10 mg qhs  Neuropathy- given G3 neuropathy will dose reduce taxol to 135 mg/m2. Will start lyrica 50 mg bid  Ok for cycle #3  F/u prior to cycle #4    Total time spent was 40 minutes regarding diagnosis of endometrial cancer and >50% of this time was spent counseling and coordinating care.   Tom Renteria DO  Gynecologic Oncology  3/30/2021/12:59 PM

## 2021-03-31 ENCOUNTER — HOSPITAL ENCOUNTER (OUTPATIENT)
Dept: INFUSION THERAPY | Age: 41
Discharge: HOME | End: 2021-03-31
Payer: COMMERCIAL

## 2021-03-31 VITALS
RESPIRATION RATE: 16 BRPM | DIASTOLIC BLOOD PRESSURE: 59 MMHG | TEMPERATURE: 97.9 F | HEART RATE: 95 BPM | SYSTOLIC BLOOD PRESSURE: 122 MMHG | OXYGEN SATURATION: 100 %

## 2021-03-31 DIAGNOSIS — R10.2 PELVIC PAIN: Primary | ICD-10-CM

## 2021-03-31 DIAGNOSIS — C54.1 ENDOMETRIAL CANCER (HCC): Primary | ICD-10-CM

## 2021-03-31 LAB
BACTERIA SPEC CULT: NORMAL
CANCER AG125 SERPL-ACNC: 19 U/ML (ref 1.5–35)
SERVICE CMNT-IMP: NORMAL

## 2021-03-31 PROCEDURE — 96367 TX/PROPH/DG ADDL SEQ IV INF: CPT

## 2021-03-31 PROCEDURE — 74011250636 HC RX REV CODE- 250/636: Performed by: OBSTETRICS & GYNECOLOGY

## 2021-03-31 PROCEDURE — 74011000258 HC RX REV CODE- 258: Performed by: OBSTETRICS & GYNECOLOGY

## 2021-03-31 PROCEDURE — 96375 TX/PRO/DX INJ NEW DRUG ADDON: CPT

## 2021-03-31 PROCEDURE — 96413 CHEMO IV INFUSION 1 HR: CPT

## 2021-03-31 PROCEDURE — 74011000250 HC RX REV CODE- 250: Performed by: OBSTETRICS & GYNECOLOGY

## 2021-03-31 PROCEDURE — 77030012965 HC NDL HUBR BBMI -A

## 2021-03-31 PROCEDURE — 96417 CHEMO IV INFUS EACH ADDL SEQ: CPT

## 2021-03-31 PROCEDURE — 96415 CHEMO IV INFUSION ADDL HR: CPT

## 2021-03-31 RX ORDER — SODIUM CHLORIDE 9 MG/ML
25 INJECTION, SOLUTION INTRAVENOUS CONTINUOUS
Status: DISPENSED | OUTPATIENT
Start: 2021-03-31 | End: 2021-03-31

## 2021-03-31 RX ORDER — HEPARIN 100 UNIT/ML
300-500 SYRINGE INTRAVENOUS AS NEEDED
Status: DISPENSED | OUTPATIENT
Start: 2021-03-31 | End: 2021-03-31

## 2021-03-31 RX ORDER — SODIUM CHLORIDE 0.9 % (FLUSH) 0.9 %
10 SYRINGE (ML) INJECTION AS NEEDED
Status: DISPENSED | OUTPATIENT
Start: 2021-03-31 | End: 2021-03-31

## 2021-03-31 RX ORDER — SODIUM CHLORIDE 9 MG/ML
10 INJECTION INTRAMUSCULAR; INTRAVENOUS; SUBCUTANEOUS AS NEEDED
Status: ACTIVE | OUTPATIENT
Start: 2021-03-31 | End: 2021-03-31

## 2021-03-31 RX ORDER — DIPHENHYDRAMINE HYDROCHLORIDE 50 MG/ML
50 INJECTION, SOLUTION INTRAMUSCULAR; INTRAVENOUS ONCE
Status: COMPLETED | OUTPATIENT
Start: 2021-03-31 | End: 2021-03-31

## 2021-03-31 RX ORDER — OXYCODONE AND ACETAMINOPHEN 5; 325 MG/1; MG/1
1 TABLET ORAL
Qty: 30 TAB | Refills: 0 | Status: SHIPPED | OUTPATIENT
Start: 2021-03-31 | End: 2021-04-14

## 2021-03-31 RX ORDER — LORAZEPAM 2 MG/ML
0.26 INJECTION INTRAMUSCULAR ONCE
Status: COMPLETED | OUTPATIENT
Start: 2021-03-31 | End: 2021-03-31

## 2021-03-31 RX ADMIN — PACLITAXEL 222 MG: 6 INJECTION, SOLUTION INTRAVENOUS at 09:37

## 2021-03-31 RX ADMIN — SODIUM CHLORIDE 150 MG: 900 INJECTION, SOLUTION INTRAVENOUS at 08:43

## 2021-03-31 RX ADMIN — DIPHENHYDRAMINE HYDROCHLORIDE 50 MG: 50 INJECTION, SOLUTION INTRAMUSCULAR; INTRAVENOUS at 08:33

## 2021-03-31 RX ADMIN — SODIUM CHLORIDE 20 MG: 9 INJECTION INTRAMUSCULAR; INTRAVENOUS; SUBCUTANEOUS at 08:41

## 2021-03-31 RX ADMIN — SODIUM CHLORIDE 500 ML: 0.9 INJECTION, SOLUTION INTRAVENOUS at 08:23

## 2021-03-31 RX ADMIN — ONDANSETRON: 2 INJECTION INTRAMUSCULAR; INTRAVENOUS at 09:07

## 2021-03-31 RX ADMIN — LORAZEPAM 0.26 MG: 2 INJECTION INTRAMUSCULAR; INTRAVENOUS at 08:37

## 2021-03-31 RX ADMIN — CARBOPLATIN 700 MG: 10 INJECTION, SOLUTION INTRAVENOUS at 12:52

## 2021-03-31 RX ADMIN — Medication 10 ML: at 14:25

## 2021-03-31 RX ADMIN — Medication 500 UNITS: at 14:25

## 2021-03-31 NOTE — PROGRESS NOTES
[facility] Progress Note    Date: 2021    Name: Apurva Moreno              MRN: [id number]              : 1980    Chemotherapy Cycle: C3D1  Taxol/Carbo       Ms. Trang Shields was assessed and education was provided. Ms. Enrique's vitals were reviewed. Visit Vitals  BP (!) 122/59 (BP 1 Location: Left arm, BP Patient Position: Sitting)   Pulse 95   Temp 97.9 °F (36.6 °C)   Resp 16   SpO2 100%     RCWMP accessed per protocol, flushes easily with brisk blood return.  ml bolus infusing per order.     Pre-medications were administered as follows:     IV Emend 150mg   IV Decadron 12mg/Zofran 16mg   IV Pepcid 20mg  IV Benadryl 50mg  IV Ativan 0.26 mg (patient stated \"I want the Ativan today so I can relax\")       30 minutes post pre-medications, chemotherapy infused as follows:     Paclitaxel 222 mg (dose reduced due to neuropathy) was infused over 3 hours per order.     VS remain stable, pt denies any s/s of an allergic reaction, denies CP, SOB, itching, hives, facial swelling or flushing.     Carboplatin 700mg infused over 1 hour per order.     VS remain stable, pt denies any s/s of an allergic reaction, denies CP, SOB, itching, hives, facial swelling or flushing.      Port flushed with NS and heparin per protocol and de-accessed. Band aid declined by patient.     Ms. Enrique tolerated infusion, and had no complaints at this time. Patient armband removed and shredded. Ms. Trang Shields was discharged from [place] [address] in stable condition at 1430. She is to return on 21 at 0900 for her next appointment.     Jessa Acuña RN  2021  12:04 PM

## 2021-04-06 ENCOUNTER — APPOINTMENT (OUTPATIENT)
Dept: INFUSION THERAPY | Age: 41
End: 2021-04-06
Payer: COMMERCIAL

## 2021-04-14 RX ORDER — ONDANSETRON 2 MG/ML
8 INJECTION INTRAMUSCULAR; INTRAVENOUS AS NEEDED
Status: CANCELLED | OUTPATIENT
Start: 2021-04-21

## 2021-04-14 RX ORDER — ACETAMINOPHEN 325 MG/1
650 TABLET ORAL AS NEEDED
Status: CANCELLED
Start: 2021-04-21

## 2021-04-14 RX ORDER — POTASSIUM CHLORIDE 7.45 MG/ML
10 INJECTION INTRAVENOUS
Status: CANCELLED
Start: 2021-04-21

## 2021-04-14 RX ORDER — DIPHENHYDRAMINE HYDROCHLORIDE 50 MG/ML
25 INJECTION, SOLUTION INTRAMUSCULAR; INTRAVENOUS AS NEEDED
Status: CANCELLED
Start: 2021-04-21

## 2021-04-14 RX ORDER — HYDROCORTISONE SODIUM SUCCINATE 100 MG/2ML
100 INJECTION, POWDER, FOR SOLUTION INTRAMUSCULAR; INTRAVENOUS AS NEEDED
Status: CANCELLED | OUTPATIENT
Start: 2021-04-21

## 2021-04-14 RX ORDER — DIPHENHYDRAMINE HYDROCHLORIDE 50 MG/ML
50 INJECTION, SOLUTION INTRAMUSCULAR; INTRAVENOUS AS NEEDED
Status: CANCELLED
Start: 2021-04-21

## 2021-04-14 RX ORDER — PROCHLORPERAZINE EDISYLATE 5 MG/ML
10 INJECTION INTRAMUSCULAR; INTRAVENOUS
Status: CANCELLED
Start: 2021-04-21

## 2021-04-14 RX ORDER — ALBUTEROL SULFATE 0.83 MG/ML
2.5 SOLUTION RESPIRATORY (INHALATION) AS NEEDED
Status: CANCELLED
Start: 2021-04-21

## 2021-04-14 RX ORDER — MAGNESIUM SULFATE HEPTAHYDRATE 40 MG/ML
2 INJECTION, SOLUTION INTRAVENOUS
Status: CANCELLED
Start: 2021-04-21

## 2021-04-14 RX ORDER — EPINEPHRINE 1 MG/ML
0.3 INJECTION, SOLUTION, CONCENTRATE INTRAVENOUS AS NEEDED
Status: CANCELLED | OUTPATIENT
Start: 2021-04-21

## 2021-04-19 ENCOUNTER — OFFICE VISIT (OUTPATIENT)
Dept: ONCOLOGY | Age: 41
End: 2021-04-19
Payer: COMMERCIAL

## 2021-04-19 ENCOUNTER — HOSPITAL ENCOUNTER (OUTPATIENT)
Dept: INFUSION THERAPY | Age: 41
Discharge: HOME | End: 2021-04-19
Payer: COMMERCIAL

## 2021-04-19 VITALS
BODY MASS INDEX: 23.23 KG/M2 | SYSTOLIC BLOOD PRESSURE: 125 MMHG | HEART RATE: 110 BPM | DIASTOLIC BLOOD PRESSURE: 85 MMHG | WEIGHT: 148 LBS | TEMPERATURE: 98.4 F | OXYGEN SATURATION: 99 % | RESPIRATION RATE: 16 BRPM | HEIGHT: 67 IN

## 2021-04-19 VITALS
TEMPERATURE: 98.4 F | OXYGEN SATURATION: 99 % | DIASTOLIC BLOOD PRESSURE: 85 MMHG | HEART RATE: 110 BPM | SYSTOLIC BLOOD PRESSURE: 125 MMHG | WEIGHT: 148 LBS | BODY MASS INDEX: 23.23 KG/M2 | HEIGHT: 67 IN | RESPIRATION RATE: 16 BRPM

## 2021-04-19 DIAGNOSIS — C54.1 ENDOMETRIAL CANCER (HCC): Primary | ICD-10-CM

## 2021-04-19 LAB
ALBUMIN SERPL-MCNC: 3.8 G/DL (ref 3.4–5)
ALBUMIN/GLOB SERPL: 1.4 {RATIO} (ref 0.8–1.7)
ALP SERPL-CCNC: 81 U/L (ref 45–117)
ALT SERPL-CCNC: 21 U/L (ref 13–56)
ANION GAP SERPL CALC-SCNC: 7 MMOL/L (ref 3–18)
APPEARANCE UR: CLEAR
AST SERPL-CCNC: 15 U/L (ref 10–38)
BASO+EOS+MONOS # BLD AUTO: 0.3 K/UL (ref 0–2.3)
BASO+EOS+MONOS NFR BLD AUTO: 10 % (ref 0.1–17)
BILIRUB SERPL-MCNC: 0.2 MG/DL (ref 0.2–1)
BILIRUB UR QL: NEGATIVE
BUN SERPL-MCNC: 7 MG/DL (ref 7–18)
BUN/CREAT SERPL: 9 (ref 12–20)
CALCIUM SERPL-MCNC: 8.9 MG/DL (ref 8.5–10.1)
CANCER AG125 SERPL-ACNC: 15 U/ML (ref 1.5–35)
CHLORIDE SERPL-SCNC: 107 MMOL/L (ref 100–111)
CO2 SERPL-SCNC: 27 MMOL/L (ref 21–32)
COLOR UR: NORMAL
CREAT SERPL-MCNC: 0.76 MG/DL (ref 0.6–1.3)
DIFFERENTIAL METHOD BLD: ABNORMAL
ERYTHROCYTE [DISTWIDTH] IN BLOOD BY AUTOMATED COUNT: 25.2 % (ref 11.5–14.5)
GLOBULIN SER CALC-MCNC: 2.8 G/DL (ref 2–4)
GLUCOSE SERPL-MCNC: 100 MG/DL (ref 74–99)
GLUCOSE UR STRIP.AUTO-MCNC: NEGATIVE MG/DL
HCT VFR BLD AUTO: 35.7 % (ref 36–48)
HGB BLD-MCNC: 11.4 G/DL (ref 12–16)
HGB UR QL STRIP: NEGATIVE
KETONES UR QL STRIP.AUTO: NEGATIVE MG/DL
LEUKOCYTE ESTERASE UR QL STRIP.AUTO: NEGATIVE
LYMPHOCYTES # BLD: 1.4 K/UL (ref 1.1–5.9)
LYMPHOCYTES NFR BLD: 44 % (ref 14–44)
MAGNESIUM SERPL-MCNC: 2.1 MG/DL (ref 1.6–2.6)
MCH RBC QN AUTO: 28.2 PG (ref 25–35)
MCHC RBC AUTO-ENTMCNC: 31.9 G/DL (ref 31–37)
MCV RBC AUTO: 88.4 FL (ref 78–102)
NEUTS SEG # BLD: 1.5 K/UL (ref 1.8–9.5)
NEUTS SEG NFR BLD: 47 % (ref 40–70)
NITRITE UR QL STRIP.AUTO: NEGATIVE
PH UR STRIP: 6.5 [PH] (ref 5–8)
PLATELET # BLD AUTO: 112 K/UL (ref 140–440)
POTASSIUM SERPL-SCNC: 3.9 MMOL/L (ref 3.5–5.5)
PROT SERPL-MCNC: 6.6 G/DL (ref 6.4–8.2)
PROT UR STRIP-MCNC: NEGATIVE MG/DL
RBC # BLD AUTO: 4.04 M/UL (ref 4.1–5.1)
SODIUM SERPL-SCNC: 141 MMOL/L (ref 136–145)
SP GR UR REFRACTOMETRY: 1.02 (ref 1–1.03)
UROBILINOGEN UR QL STRIP.AUTO: 0.2 EU/DL (ref 0.2–1)
WBC # BLD AUTO: 3.2 K/UL (ref 4.5–13)

## 2021-04-19 PROCEDURE — 36591 DRAW BLOOD OFF VENOUS DEVICE: CPT

## 2021-04-19 PROCEDURE — 83735 ASSAY OF MAGNESIUM: CPT

## 2021-04-19 PROCEDURE — 74011250636 HC RX REV CODE- 250/636: Performed by: OBSTETRICS & GYNECOLOGY

## 2021-04-19 PROCEDURE — 85025 COMPLETE CBC W/AUTO DIFF WBC: CPT

## 2021-04-19 PROCEDURE — 80053 COMPREHEN METABOLIC PANEL: CPT

## 2021-04-19 PROCEDURE — 81003 URINALYSIS AUTO W/O SCOPE: CPT

## 2021-04-19 PROCEDURE — 99215 OFFICE O/P EST HI 40 MIN: CPT | Performed by: OBSTETRICS & GYNECOLOGY

## 2021-04-19 PROCEDURE — 87086 URINE CULTURE/COLONY COUNT: CPT

## 2021-04-19 PROCEDURE — 77030012965 HC NDL HUBR BBMI -A

## 2021-04-19 PROCEDURE — 86304 IMMUNOASSAY TUMOR CA 125: CPT

## 2021-04-19 RX ORDER — SODIUM CHLORIDE 0.9 % (FLUSH) 0.9 %
5-10 SYRINGE (ML) INJECTION AS NEEDED
Status: DISCONTINUED | OUTPATIENT
Start: 2021-04-19 | End: 2021-04-21 | Stop reason: HOSPADM

## 2021-04-19 RX ORDER — HEPARIN 100 UNIT/ML
500 SYRINGE INTRAVENOUS ONCE
Status: COMPLETED | OUTPATIENT
Start: 2021-04-19 | End: 2021-04-19

## 2021-04-19 RX ADMIN — HEPARIN 500 UNITS: 100 SYRINGE at 09:08

## 2021-04-19 RX ADMIN — Medication 10 ML: at 09:08

## 2021-04-19 NOTE — PROGRESS NOTES
Radha Enrique is a 39 y.o. female presents in office for endometrial cancer, accompanied by her . Chief Complaint   Patient presents with    Uterine Cancer        Pt c/o full body aches and pains, manageable with Lyrica. Pain worse in legs with one bruise on inside of left leg. Pt c/o occasional mouth sores, has magic mouthwash. Pt states minimal decreased taste. Pt c/o insomnia, helped with Ambien 10 mg. Pt c/o fatigue. Pt denies headache, dizziness, decreased appetite. Do you have any unusual vaginal bleeding, discharge or irritation? No  Do you have any changes in your bowel movements? Pt c/o constipation, treated with Colace. Have you been experiencing nausea or vomiting? Pt c/o nausea, treated with Zofran. Have you been experiencing any continuous or worsening abdominal pain? Pt c/o rib pain/aching after treatment, now resolved. Any urinary burning? No    Visit Vitals  /85   Pulse (!) 110   Temp 98.4 °F (36.9 °C)   Resp 16   Ht 5' 7\" (1.702 m)   Wt 67.1 kg (148 lb)   SpO2 99%   BMI 23.18 kg/m²         1. Have you been to the ER, urgent care clinic since your last visit? Hospitalized since your last visit? No    2. Have you seen or consulted any other health care providers outside of the [address] [place] [address] since your last visit? Include any pap smears or colon screening.  No    3 most recent PHQ Screens 1/29/2021   Little interest or pleasure in doing things Not at all   Feeling down, depressed, irritable, or hopeless Not at all   Total Score PHQ 2 0       Learning Assessment 3/9/2021   PRIMARY LEARNER Patient   BARRIERS PRIMARY LEARNER NONE   CO-LEARNER CAREGIVER No   PRIMARY LANGUAGE ENGLISH   LEARNER PREFERENCE PRIMARY DEMONSTRATION   ANSWERED BY Patient   RELATIONSHIP SELF

## 2021-04-19 NOTE — PROGRESS NOTES
[facility] SPECIALISTS  [address] [place] [address], [address], [address] [facility]  [address] [facility], [address]  [place], [address]  [phone]  Pleasant Perish DO        Patient ID:  Name: Apurva Moreno  MRM: [id number]  : 1980/41 y.o. Date: 2021    SUBJECTIVE:    This is a 39 y.o.  female with h/o Stage IIIBG1 endometrial caner s/p Robotic-assisted total laparoscopic hysterectomy, bilateral salpingo-oophorectomy, upper vaginectomy; bilateral pelvic and para-aortic lymph node dissection; peritoneal biopsies and washings; cystoscopy. on 2021  S/p cycle #3 of Carbo/taxol on3/ with dose reduction of taxol at cycle #3. Appetite improved and gaining weight. Sleep is better with ambien 10 mg. Still having numbness in toes. Felt like \"hit by [facility] truck\" on days 3-6. Component      Latest Ref Rng & Units 3/30/2021           9:00 AM   CA-125      1.5 - 35.0 U/mL 19     Component      Latest Ref Rng & Units 3/9/2021           9:19 AM   CA-125      1.5 - 35.0 U/mL 30     Component      Latest Ref Rng & Units 2021          11:45 AM   CA-125      1.5 - 35.0 U/mL 31       Her pathology revealed      A: CERVIX, UTERUS, BILATERAL FALLOPIAN TUBES, AND UPPER VAGINA, TOTAL HYSTERECTOMY WITH BILATERAL SALPINGO-OOPHORECTOMY AND PARTIAL VAGINECTOMY:   ENDOMETRIAL ENDOMETRIOID ADENOCARCINOMA, FIGO GRADE 1, WITH EXTENSIVE INVASION INTO THE CERVIX AND UPPER VAGINA (PLEASE SEE COMMENTS AND SYNOPTIC REPORT). SURGICAL MARGINS NEGATIVE. BENIGN BILATERAL FALLOPIAN TUBES AND OVARIES. B: PELVIC ADHESIONS, BIOPSY:   BENIGN FIBROUS TISSUE. NEGATIVE FOR MALIGNANCY. C: RIGHT PELVIC SIDEWALL PERITONEUM, BIOPSY:   ADENOCARCINOMA. D: RECTOSIGMOID EPIPLOICAE ADHESION, BIOPSY:   ADENOCARCINOMA. E: VAGINA, BIOPSY:   ACUTE INFLAMMATORY TISSUE. NO CARCINOMA SEEN.    F: RIGHT PELVIC LYMPH NODE, DISSECTION:   THIRTEEN (13) BENIGN LYMPH NODES.   G: LEFT PELVIC LYMPH NODE, DISSECTION:   EIGHT (8) BENIGN LYMPH NODES. H: RIGHT PARA-AORTIC LYMPH NODE, EXCISION:   ONE (1) BENIGN LYMPH NODE. I: LEFT PARA-AORTIC LYMPH NODE, EXCISION:   BENIGN FIBROADIPOSE TISSUE. NO LYMPHOID TISSUE SEEN. DIAGNOSIS COMMENT:   The intraoperative consultation (specimen A) is corroborated. Immunohistochemistry results for DNA mismatch repair proteins will be reported as an addendum. ENDOMETRIUM   SPECIMEN   Procedure: Total hysterectomy and bilateral salpingo-oophorectomy   TUMOR   Histologic Type: Endometrioid carcinoma, NOS   Histologic Grade: FIGO grade 1   Myometrial Invasion: Present   Depth of Myometrial Invasion (Millimeters): 6 mm   Myometrial Thickness (Millimeters): 28 mm   Percentage of Myometrial Invasion: 21 %   Uterine Serosa Involvement: Not identified   Lower Uterine Segment Involvement: Present, myoinvasive   Cervical Stromal Involvement: Present   Other Tissue / Organ Involvement: Upper Vagina, Pelvic wall, Rectosigmoid adhesion   Peritoneal Ascitic Fluid: Atypical: favor reactive   Lymphovascular Invasion: Not identified   MARGINS   Margins: Ectocervical / Vaginal Cuff Margin: Uninvolved by carcinoma   LYMPH NODES   Lymph Node Status: All lymph nodes negative for tumor cells   Total Number of Pelvic Nodes Examined: 21   Total Number of Para-aortic Nodes Examined: 1   PATHOLOGIC STAGE CLASSIFICATION (pTNM, AJCC 8th Edition)   Primary Tumor (pT): pT3b   Regional Lymph Nodes (pN): pN0   Distant Metastasis (pM): pM1   Specify Site(s): Right Pelvic sidewall peritoneum   GROSS DESCRIPTION:   The specimen is received in nine parts.    TEST(S) PERFORMED   Test(s) Performed: Mismatch Repair   Immunohistochemistry (IHC) Testing for Mismatch Repair (MMR)   Proteins: MLH1, MSH2, MSH6, PMS2   MLH1: Intact nuclear expression   MSH2: Loss of nuclear expression   MSH6: Loss of nuclear expression   PMS2: Intact nuclear expression     Medications:     Current Outpatient Medications on File Prior to Visit   Medication Sig Dispense Refill    zolpidem (AMBIEN) 10 mg tablet Take 1 Tab by mouth nightly as needed for Sleep for up to 30 days. Max Daily Amount: 10 mg. 30 Tab 0    pregabalin (LYRICA) 50 mg capsule Take 1 Cap by mouth two (2) times a day. Max Daily Amount: 100 mg. 60 Cap 3    ondansetron hcl (ZOFRAN) 4 mg tablet Take 1 Tab by mouth every eight (8) hours as needed for Nausea or Vomiting. 30 Tab 2    zolpidem (Ambien) 5 mg tablet Take 1 Tab by mouth nightly as needed for Sleep. Max Daily Amount: 5 mg. 30 Tab 0    magic mouthwash solution Magic mouth wash   Maalox  Lidocaine 2% viscous   Diphenhydramine oral solution     Pharmacy to mix equal portions of ingredients to a total volume as indicated in the dispense amount. Indications: stomatitis, a condition with painful swelling and sores inside the mouth, Swish and spit 5-10 mL PRN every 6 hours for mouth sores. 250 mL 0    LORazepam (Ativan) 0.5 mg tablet Take 1 Tab by mouth nightly as needed for Anxiety. Max Daily Amount: 0.5 mg. 30 Tab 0    ferrous sulfate 300 mg (60 mg iron)/5 mL syrup Take 5 mL by mouth daily for 180 days. 150 mL 5    promethazine (PHENERGAN) 25 mg tablet Take 1 Tab by mouth every six (6) hours as needed for Nausea. 30 Tab 2    lidocaine-prilocaine (EMLA) topical cream Apply  to affected area as needed for Pain. 30 g 0    megestroL (MEGACE) 400 mg/10 mL (10 mL) suspension Take 10 mL by mouth two (2) times a day. 1 Bottle 3    ondansetron hcl (Zofran) 4 mg tablet Take 1 Tab by mouth every six (6) hours. 30 Tab 0    omeprazole (PriLOSEC OTC) 20 mg tablet Take 20 mg by mouth daily.  mv-min/iron/folic/calcium/vitK (WOMEN'S MULTIVITAMIN PO) Take  by mouth daily.  cyclobenzaprine (FLEXERIL) 10 mg tablet Take  by mouth three (3) times daily as needed for Muscle Spasm(s).       dexAMETHasone (DECADRON) 4 mg tablet Take 40 mg (10 tablets) the night before Cycle #1 and Cycle #2 chemotherapy treatments. 20 Tab 0     Current Facility-Administered Medications on File Prior to Visit   Medication Dose Route Frequency Provider Last Rate Last Admin    sodium chloride (NS) flush 5-10 mL  5-10 mL IntraVENous PRN Cash Block MD   10 mL at 04/19/21 0908    [COMPLETED] heparin (porcine) pf 500 Units  500 Units InterCATHeter ONCE Cash Block MD   500 Units at 04/19/21 3414       Allergies: Allergies   Allergen Reactions    Codeine Hives       OBJECTIVE:    Vitals:   Visit Vitals  /85   Pulse (!) 110   Temp 98.4 °F (36.9 °C)   Resp 16   Ht 5' 7\" (1.702 m)   Wt 67.1 kg (148 lb)   LMP 01/09/2021 (Approximate)   SpO2 99%   BMI 23.18 kg/m²       Physical Examination:    General:  alert, cooperative, no distress   Abdomen: soft, bowel sounds active, non-tender   Incision: healed   Pelvic: Deferred    Rectal: not done   Extremity:   extremities normal, atraumatic, no cyanosis or edema     IMPRESSION/PLAN:    Stage IIIBG1 endometrial cancer  The operative procedures and clinical results have been reviewed with the patient. Previously Discussed recommendation for adjuvant chemotherapy  Plan for carbo auc=6, taxol 175 mg/m2 IV every 3 wks x 6 cycles. S/p cycle #3. Dose reduction to 135 mg/m2   Caris: pending  Invitae: MSH2 pathogenic c/w park syndrome. Will refer to GI  Decreased appetite-improved  Tolerating chemo well. Chemo induced anemia: Hgb: 11.4.  will monitor. Also taking Iron  Constipation- senokot, colace-improved  Body aches- percocet prn. Insomnia- explained she could take ambien 10 mg qhs  Neuropathy- given G3 neuropathy will dose reduce taxol to 135 mg/m2. Will start lyrica 50 mg bid- improved  Ok for cycle #4  F/u prior to cycle #5    Total time spent was 40 minutes regarding diagnosis of endometrial cancer and >50% of this time was spent counseling and coordinating care.   Alfonso Schaeffer DO  Gynecologic Oncology  4/19/2021/12:59 PM

## 2021-04-19 NOTE — PROGRESS NOTES
[facility] Progress Note    Date: 2021    Name: Zachariah Higuera    MRN: [id number]         : 1980    Pre-chemo labs    Ms. Emily De La Cruz was assessed and education was provided. Pt says she is having pain/numbness in her hand and feet which is relieved by lyrica. Pt denies any questions or concerns pertaining to tomorrows chemo. Ms. Enrique's vitals were reviewed and patient was observed for 5 minutes prior to treatment. Visit Vitals  /85 (BP 1 Location: Left arm, BP Patient Position: Sitting)   Pulse (!) 110   Temp 98.4 °F (36.9 °C)   Resp 16   Ht 5' 7\" (1.702 m)   Wt 67.1 kg (148 lb)   SpO2 99%   BMI 23.18 kg/m²         LCWMP accessed per protocol with 20 g x 0.75\" , flushes easily with brisk blood return verified after laying patient in a suspine position. Labs obtained per policy. Flushed with 10 NS and 500 units heparin instilled     Bandaid applied. Lab results were obtained and reviewed. Recent Results (from the past 12 hour(s))   URINALYSIS W/ RFLX MICROSCOPIC    Collection Time: 21  8:55 AM   Result Value Ref Range    Color DARK YELLOW      Appearance CLEAR      Specific gravity 1.017 1.005 - 1.030      pH (UA) 6.5 5.0 - 8.0      Protein Negative NEG mg/dL    Glucose Negative NEG mg/dL    Ketone Negative NEG mg/dL    Bilirubin Negative NEG      Blood Negative NEG      Urobilinogen 0.2 0.2 - 1.0 EU/dL    Nitrites Negative NEG      Leukocyte Esterase Negative NEG     CBC WITH 3 PART DIFF    Collection Time: 21  9:00 AM   Result Value Ref Range    WBC 3.2 (L) 4.5 - 13.0 K/uL    RBC 4.04 (L) 4.10 - 5.10 M/uL    HGB 11.4 (L) 12.0 - 16.0 g/dL    HCT 35.7 (L) 36 - 48 %    MCV 88.4 78 - 102 FL    MCH 28.2 25.0 - 35.0 PG    MCHC 31.9 31 - 37 g/dL    RDW 25.2 (H) 11.5 - 14.5 %    PLATELET 568 (L) 417 - 440 K/uL    NEUTROPHILS 47 40 - 70 %    MIXED CELLS 10 0.1 - 17 %    LYMPHOCYTES 44 14 - 44 %    ABS. NEUTROPHILS 1.5 (L) 1.8 - 9.5 K/UL    ABS.  MIXED CELLS 0.3 0.0 - 2.3 K/uL    ABS. LYMPHOCYTES 1.4 1.1 - 5.9 K/UL    DF AUTOMATED     MAGNESIUM    Collection Time: 04/19/21  9:00 AM   Result Value Ref Range    Magnesium 2.1 1.6 - 2.6 mg/dL   METABOLIC PANEL, COMPREHENSIVE    Collection Time: 04/19/21  9:00 AM   Result Value Ref Range    Sodium 141 136 - 145 mmol/L    Potassium 3.9 3.5 - 5.5 mmol/L    Chloride 107 100 - 111 mmol/L    CO2 27 21 - 32 mmol/L    Anion gap 7 3.0 - 18 mmol/L    Glucose 100 (H) 74 - 99 mg/dL    BUN 7 7.0 - 18 MG/DL    Creatinine 0.76 0.6 - 1.3 MG/DL    BUN/Creatinine ratio 9 (L) 12 - 20      GFR est AA >60 >60 ml/min/1.73m2    GFR est non-AA >60 >60 ml/min/1.73m2    Calcium 8.9 8.5 - 10.1 MG/DL    Bilirubin, total 0.2 0.2 - 1.0 MG/DL    ALT (SGPT) 21 13 - 56 U/L    AST (SGOT) 15 10 - 38 U/L    Alk. phosphatase 81 45 - 117 U/L    Protein, total 6.6 6.4 - 8.2 g/dL    Albumin 3.8 3.4 - 5.0 g/dL    Globulin 2.8 2.0 - 4.0 g/dL    A-G Ratio 1.4 0.8 - 1.7        see [facility] for ca125; ANC 1.5 level reported to [facility], RN at [facility] office. Ms. Juve Gonzalez tolerated the infusion, and had no complaints. Patient armband removed and shredded. Ms. Juve Gonzalez was discharged from [place] [address] in stable condition at [address] [place] [address].  She is to return on 4/21/2021 at 0800 for her next appointment for chemo    Davide Macias RN  April 19, 2021

## 2021-04-20 LAB
BACTERIA SPEC CULT: NORMAL
SERVICE CMNT-IMP: NORMAL

## 2021-04-21 ENCOUNTER — HOSPITAL ENCOUNTER (OUTPATIENT)
Dept: INFUSION THERAPY | Age: 41
Discharge: HOME | End: 2021-04-21
Payer: COMMERCIAL

## 2021-04-21 VITALS
DIASTOLIC BLOOD PRESSURE: 71 MMHG | HEART RATE: 90 BPM | RESPIRATION RATE: 16 BRPM | OXYGEN SATURATION: 100 % | SYSTOLIC BLOOD PRESSURE: 107 MMHG | TEMPERATURE: 97.7 F

## 2021-04-21 DIAGNOSIS — C54.1 ENDOMETRIAL CANCER (HCC): Primary | ICD-10-CM

## 2021-04-21 PROCEDURE — 96413 CHEMO IV INFUSION 1 HR: CPT

## 2021-04-21 PROCEDURE — 77030012965 HC NDL HUBR BBMI -A

## 2021-04-21 PROCEDURE — 74011250636 HC RX REV CODE- 250/636: Performed by: OBSTETRICS & GYNECOLOGY

## 2021-04-21 PROCEDURE — 96417 CHEMO IV INFUS EACH ADDL SEQ: CPT

## 2021-04-21 PROCEDURE — 96415 CHEMO IV INFUSION ADDL HR: CPT

## 2021-04-21 PROCEDURE — 96367 TX/PROPH/DG ADDL SEQ IV INF: CPT

## 2021-04-21 PROCEDURE — 74011000250 HC RX REV CODE- 250: Performed by: OBSTETRICS & GYNECOLOGY

## 2021-04-21 PROCEDURE — 74011000258 HC RX REV CODE- 258: Performed by: OBSTETRICS & GYNECOLOGY

## 2021-04-21 PROCEDURE — 96375 TX/PRO/DX INJ NEW DRUG ADDON: CPT

## 2021-04-21 RX ORDER — DIPHENHYDRAMINE HYDROCHLORIDE 50 MG/ML
50 INJECTION, SOLUTION INTRAMUSCULAR; INTRAVENOUS ONCE
Status: COMPLETED | OUTPATIENT
Start: 2021-04-21 | End: 2021-04-21

## 2021-04-21 RX ORDER — HEPARIN 100 UNIT/ML
300-500 SYRINGE INTRAVENOUS AS NEEDED
Status: ACTIVE | OUTPATIENT
Start: 2021-04-21 | End: 2021-04-21

## 2021-04-21 RX ORDER — SODIUM CHLORIDE 0.9 % (FLUSH) 0.9 %
10 SYRINGE (ML) INJECTION AS NEEDED
Status: DISPENSED | OUTPATIENT
Start: 2021-04-21 | End: 2021-04-21

## 2021-04-21 RX ORDER — SODIUM CHLORIDE 9 MG/ML
25 INJECTION, SOLUTION INTRAVENOUS CONTINUOUS
Status: DISPENSED | OUTPATIENT
Start: 2021-04-21 | End: 2021-04-21

## 2021-04-21 RX ORDER — SODIUM CHLORIDE 9 MG/ML
10 INJECTION INTRAMUSCULAR; INTRAVENOUS; SUBCUTANEOUS AS NEEDED
Status: ACTIVE | OUTPATIENT
Start: 2021-04-21 | End: 2021-04-21

## 2021-04-21 RX ORDER — LORAZEPAM 2 MG/ML
0.26 INJECTION INTRAMUSCULAR ONCE
Status: COMPLETED | OUTPATIENT
Start: 2021-04-21 | End: 2021-04-21

## 2021-04-21 RX ADMIN — HEPARIN 500 UNITS: 100 SYRINGE at 14:14

## 2021-04-21 RX ADMIN — DIPHENHYDRAMINE HYDROCHLORIDE 50 MG: 50 INJECTION, SOLUTION INTRAMUSCULAR; INTRAVENOUS at 09:33

## 2021-04-21 RX ADMIN — PACLITAXEL 222 MG: 6 INJECTION, SOLUTION INTRAVENOUS at 09:43

## 2021-04-21 RX ADMIN — SODIUM CHLORIDE 25 ML/HR: 0.9 INJECTION, SOLUTION INTRAVENOUS at 08:20

## 2021-04-21 RX ADMIN — CARBOPLATIN 700 MG: 10 INJECTION, SOLUTION INTRAVENOUS at 12:55

## 2021-04-21 RX ADMIN — SODIUM CHLORIDE 20 MG: 9 INJECTION INTRAMUSCULAR; INTRAVENOUS; SUBCUTANEOUS at 09:29

## 2021-04-21 RX ADMIN — SODIUM CHLORIDE 150 MG: 900 INJECTION, SOLUTION INTRAVENOUS at 08:30

## 2021-04-21 RX ADMIN — Medication 10 ML: at 14:11

## 2021-04-21 RX ADMIN — SODIUM CHLORIDE 500 ML: 0.9 INJECTION, SOLUTION INTRAVENOUS at 09:40

## 2021-04-21 RX ADMIN — LORAZEPAM 0.26 MG: 2 INJECTION INTRAMUSCULAR; INTRAVENOUS at 09:36

## 2021-04-21 RX ADMIN — ONDANSETRON: 2 INJECTION INTRAMUSCULAR; INTRAVENOUS at 09:00

## 2021-04-21 NOTE — PROGRESS NOTES
[address] [facility] Progress Note    Date: 2021    Name: Carlos Lam              MRN: [id number]              : 1980    Chemotherapy Cycle: C4D1  Taxol/Carbo       Ms. Diamond Gonzalez was assessed and education was provided. Ms. Enrique's vitals were reviewed. Visit Vitals  /71 (BP 1 Location: Left arm, BP Patient Position: Sitting)   Pulse 90   Temp 97.7 °F (36.5 °C)   Resp 16   SpO2 100%     LCWMP accessed per protocol, flushes easily with brisk blood return. ANC 1.5 level reported to [facility], RN at [facility] office today.  ml bolus infusing per order.     Pre-medications were administered as follows:     IV Emend 150mg   IV Decadron 12mg/Zofran 16mg   IV Pepcid 20mg  IV Benadryl 50mg  IV Ativan 0.26 mg       Paclitaxel 222 mg (dose reduced due to neuropathy) was infused over 3 hours per order.     VS remain stable, pt denies any s/s of an allergic reaction, denies CP, SOB, itching, hives, facial swelling or flushing.     Carboplatin 700mg infused over 1 hour per order.     VS remain stable, pt denies any s/s of an allergic reaction, denies CP, SOB, itching, hives, facial swelling or flushing.      Port flushed with NS and heparin per protocol and de-accessed. Band aid declined by patient.     Ms. Enrique tolerated infusion, and had no complaints at this time. Patient armband removed and shredded. Ms. Diamond Gonzalez was discharged from [place] [address] in stable condition at [phone]. She is to return on 21 for her next appointment for pre-chemo labs.     Bejnamin Phipps RN  2021  12:04 PM

## 2021-04-26 RX ORDER — ONDANSETRON 4 MG/1
4 TABLET, FILM COATED ORAL
Qty: 30 TAB | Refills: 2 | Status: SHIPPED | OUTPATIENT
Start: 2021-04-26 | End: 2021-06-22 | Stop reason: SDUPTHER

## 2021-04-28 ENCOUNTER — PATIENT MESSAGE (OUTPATIENT)
Dept: ONCOLOGY | Age: 41
End: 2021-04-28

## 2021-04-28 DIAGNOSIS — F51.01 PRIMARY INSOMNIA: ICD-10-CM

## 2021-04-28 RX ORDER — ZOLPIDEM TARTRATE 10 MG/1
10 TABLET ORAL
Qty: 30 TAB | Refills: 0 | Status: SHIPPED | OUTPATIENT
Start: 2021-04-28 | End: 2021-06-01 | Stop reason: DRUGHIGH

## 2021-04-29 ENCOUNTER — TELEPHONE (OUTPATIENT)
Dept: ONCOLOGY | Age: 41
End: 2021-04-29

## 2021-04-29 NOTE — TELEPHONE ENCOUNTER
Patients spouse called the office wanting to know if we received their message regarding patients return to work letter.

## 2021-05-04 ENCOUNTER — PATIENT MESSAGE (OUTPATIENT)
Dept: ONCOLOGY | Age: 41
End: 2021-05-04

## 2021-05-04 DIAGNOSIS — G89.3 CANCER ASSOCIATED PAIN: Primary | ICD-10-CM

## 2021-05-04 DIAGNOSIS — R10.2 PELVIC PAIN: ICD-10-CM

## 2021-05-04 NOTE — TELEPHONE ENCOUNTER
From: Corrine Guo  To: Vickie Officer, MD  Sent: 5/4/2021 12:38 PM EDT  Subject: Prescription Question    I need another refill on my pain meds (oxycodone) please. Thank you.

## 2021-05-05 RX ORDER — EPINEPHRINE 1 MG/ML
0.3 INJECTION, SOLUTION, CONCENTRATE INTRAVENOUS AS NEEDED
Status: CANCELLED | OUTPATIENT
Start: 2021-05-12

## 2021-05-05 RX ORDER — DIPHENHYDRAMINE HYDROCHLORIDE 50 MG/ML
50 INJECTION, SOLUTION INTRAMUSCULAR; INTRAVENOUS ONCE
Status: CANCELLED | OUTPATIENT
Start: 2021-05-12 | End: 2021-05-12

## 2021-05-05 RX ORDER — POTASSIUM CHLORIDE 7.45 MG/ML
10 INJECTION INTRAVENOUS
Status: CANCELLED
Start: 2021-05-12

## 2021-05-05 RX ORDER — HEPARIN 100 UNIT/ML
300-500 SYRINGE INTRAVENOUS AS NEEDED
Status: CANCELLED
Start: 2021-05-12

## 2021-05-05 RX ORDER — PROCHLORPERAZINE EDISYLATE 5 MG/ML
10 INJECTION INTRAMUSCULAR; INTRAVENOUS
Status: CANCELLED
Start: 2021-05-12

## 2021-05-05 RX ORDER — SODIUM CHLORIDE 9 MG/ML
25 INJECTION, SOLUTION INTRAVENOUS CONTINUOUS
Status: CANCELLED | OUTPATIENT
Start: 2021-05-12

## 2021-05-05 RX ORDER — ACETAMINOPHEN 325 MG/1
650 TABLET ORAL AS NEEDED
Status: CANCELLED
Start: 2021-05-12

## 2021-05-05 RX ORDER — OXYCODONE AND ACETAMINOPHEN 5; 325 MG/1; MG/1
1 TABLET ORAL
Qty: 30 TAB | Refills: 0 | Status: SHIPPED | OUTPATIENT
Start: 2021-05-05 | End: 2021-05-18 | Stop reason: SDUPTHER

## 2021-05-05 RX ORDER — SODIUM CHLORIDE 0.9 % (FLUSH) 0.9 %
10 SYRINGE (ML) INJECTION AS NEEDED
Status: CANCELLED | OUTPATIENT
Start: 2021-05-12

## 2021-05-05 RX ORDER — OXYCODONE AND ACETAMINOPHEN 5; 325 MG/1; MG/1
TABLET ORAL
Qty: 30 TAB | OUTPATIENT
Start: 2021-05-05

## 2021-05-05 RX ORDER — DIPHENHYDRAMINE HYDROCHLORIDE 50 MG/ML
25 INJECTION, SOLUTION INTRAMUSCULAR; INTRAVENOUS AS NEEDED
Status: CANCELLED
Start: 2021-05-12

## 2021-05-05 RX ORDER — ALBUTEROL SULFATE 0.83 MG/ML
2.5 SOLUTION RESPIRATORY (INHALATION) AS NEEDED
Status: CANCELLED
Start: 2021-05-12

## 2021-05-05 RX ORDER — DIPHENHYDRAMINE HYDROCHLORIDE 50 MG/ML
50 INJECTION, SOLUTION INTRAMUSCULAR; INTRAVENOUS AS NEEDED
Status: CANCELLED
Start: 2021-05-12

## 2021-05-05 RX ORDER — SODIUM CHLORIDE 9 MG/ML
10 INJECTION INTRAMUSCULAR; INTRAVENOUS; SUBCUTANEOUS AS NEEDED
Status: CANCELLED | OUTPATIENT
Start: 2021-05-12

## 2021-05-05 RX ORDER — LORAZEPAM 2 MG/ML
0.26 INJECTION INTRAMUSCULAR ONCE
Status: CANCELLED
Start: 2021-05-12 | End: 2021-05-12

## 2021-05-05 RX ORDER — HYDROCORTISONE SODIUM SUCCINATE 100 MG/2ML
100 INJECTION, POWDER, FOR SOLUTION INTRAMUSCULAR; INTRAVENOUS AS NEEDED
Status: CANCELLED | OUTPATIENT
Start: 2021-05-12

## 2021-05-05 RX ORDER — ONDANSETRON 2 MG/ML
8 INJECTION INTRAMUSCULAR; INTRAVENOUS AS NEEDED
Status: CANCELLED | OUTPATIENT
Start: 2021-05-12

## 2021-05-05 RX ORDER — MAGNESIUM SULFATE HEPTAHYDRATE 40 MG/ML
2 INJECTION, SOLUTION INTRAVENOUS
Status: CANCELLED
Start: 2021-05-12

## 2021-05-06 ENCOUNTER — TELEPHONE (OUTPATIENT)
Dept: ONCOLOGY | Age: 41
End: 2021-05-06

## 2021-05-06 NOTE — TELEPHONE ENCOUNTER
Spoke with patient to relay that FMLA is completed. She requests the form be uploaded to [employer] for her to print.

## 2021-05-11 ENCOUNTER — HOSPITAL ENCOUNTER (OUTPATIENT)
Dept: INFUSION THERAPY | Age: 41
Discharge: HOME | End: 2021-05-11
Payer: COMMERCIAL

## 2021-05-11 ENCOUNTER — OFFICE VISIT (OUTPATIENT)
Dept: ONCOLOGY | Age: 41
End: 2021-05-11
Payer: COMMERCIAL

## 2021-05-11 ENCOUNTER — TELEPHONE (OUTPATIENT)
Dept: ONCOLOGY | Age: 41
End: 2021-05-11

## 2021-05-11 VITALS
OXYGEN SATURATION: 98 % | RESPIRATION RATE: 16 BRPM | TEMPERATURE: 98.1 F | HEART RATE: 94 BPM | HEIGHT: 67 IN | BODY MASS INDEX: 24.48 KG/M2 | DIASTOLIC BLOOD PRESSURE: 83 MMHG | WEIGHT: 156 LBS | SYSTOLIC BLOOD PRESSURE: 131 MMHG

## 2021-05-11 VITALS
WEIGHT: 156.04 LBS | RESPIRATION RATE: 16 BRPM | BODY MASS INDEX: 24.49 KG/M2 | HEART RATE: 94 BPM | DIASTOLIC BLOOD PRESSURE: 83 MMHG | OXYGEN SATURATION: 98 % | HEIGHT: 67 IN | TEMPERATURE: 98.1 F | SYSTOLIC BLOOD PRESSURE: 131 MMHG

## 2021-05-11 DIAGNOSIS — C54.1 ENDOMETRIAL CANCER (HCC): Primary | ICD-10-CM

## 2021-05-11 LAB
ALBUMIN SERPL-MCNC: 3.7 G/DL (ref 3.4–5)
ALBUMIN/GLOB SERPL: 1.3 {RATIO} (ref 0.8–1.7)
ALP SERPL-CCNC: 73 U/L (ref 45–117)
ALT SERPL-CCNC: 29 U/L (ref 13–56)
ANION GAP SERPL CALC-SCNC: 8 MMOL/L (ref 3–18)
APPEARANCE UR: CLEAR
AST SERPL-CCNC: 17 U/L (ref 10–38)
BASO+EOS+MONOS # BLD AUTO: 0.4 K/UL (ref 0–2.3)
BASO+EOS+MONOS NFR BLD AUTO: 11 % (ref 0.1–17)
BILIRUB SERPL-MCNC: 0.3 MG/DL (ref 0.2–1)
BILIRUB UR QL: NEGATIVE
BUN SERPL-MCNC: 9 MG/DL (ref 7–18)
BUN/CREAT SERPL: 12 (ref 12–20)
CALCIUM SERPL-MCNC: 9.3 MG/DL (ref 8.5–10.1)
CANCER AG125 SERPL-ACNC: 14 U/ML (ref 1.5–35)
CHLORIDE SERPL-SCNC: 108 MMOL/L (ref 100–111)
CO2 SERPL-SCNC: 27 MMOL/L (ref 21–32)
COLOR UR: NORMAL
CREAT SERPL-MCNC: 0.73 MG/DL (ref 0.6–1.3)
DIFFERENTIAL METHOD BLD: ABNORMAL
ERYTHROCYTE [DISTWIDTH] IN BLOOD BY AUTOMATED COUNT: 19.9 % (ref 11.5–14.5)
GLOBULIN SER CALC-MCNC: 2.9 G/DL (ref 2–4)
GLUCOSE SERPL-MCNC: 109 MG/DL (ref 74–99)
GLUCOSE UR STRIP.AUTO-MCNC: NEGATIVE MG/DL
HCT VFR BLD AUTO: 35.8 % (ref 36–48)
HGB BLD-MCNC: 11.9 G/DL (ref 12–16)
HGB UR QL STRIP: NEGATIVE
KETONES UR QL STRIP.AUTO: NEGATIVE MG/DL
LEUKOCYTE ESTERASE UR QL STRIP.AUTO: NEGATIVE
LYMPHOCYTES # BLD: 1.1 K/UL (ref 1.1–5.9)
LYMPHOCYTES NFR BLD: 33 % (ref 14–44)
MAGNESIUM SERPL-MCNC: 1.9 MG/DL (ref 1.6–2.6)
MCH RBC QN AUTO: 30.9 PG (ref 25–35)
MCHC RBC AUTO-ENTMCNC: 33.2 G/DL (ref 31–37)
MCV RBC AUTO: 93 FL (ref 78–102)
NEUTS SEG # BLD: 1.9 K/UL (ref 1.8–9.5)
NEUTS SEG NFR BLD: 56 % (ref 40–70)
NITRITE UR QL STRIP.AUTO: NEGATIVE
PH UR STRIP: 6 [PH] (ref 5–8)
PLATELET # BLD AUTO: 76 K/UL (ref 140–440)
POTASSIUM SERPL-SCNC: 3.5 MMOL/L (ref 3.5–5.5)
PROT SERPL-MCNC: 6.6 G/DL (ref 6.4–8.2)
PROT UR STRIP-MCNC: NEGATIVE MG/DL
RBC # BLD AUTO: 3.85 M/UL (ref 4.1–5.1)
SODIUM SERPL-SCNC: 143 MMOL/L (ref 136–145)
SP GR UR REFRACTOMETRY: 1.02 (ref 1–1.03)
UROBILINOGEN UR QL STRIP.AUTO: 0.2 EU/DL (ref 0.2–1)
WBC # BLD AUTO: 3.4 K/UL (ref 4.5–13)

## 2021-05-11 PROCEDURE — 81003 URINALYSIS AUTO W/O SCOPE: CPT

## 2021-05-11 PROCEDURE — 86304 IMMUNOASSAY TUMOR CA 125: CPT

## 2021-05-11 PROCEDURE — 77030012965 HC NDL HUBR BBMI -A

## 2021-05-11 PROCEDURE — 87086 URINE CULTURE/COLONY COUNT: CPT

## 2021-05-11 PROCEDURE — 74011250636 HC RX REV CODE- 250/636: Performed by: OBSTETRICS & GYNECOLOGY

## 2021-05-11 PROCEDURE — 96523 IRRIG DRUG DELIVERY DEVICE: CPT

## 2021-05-11 PROCEDURE — 80053 COMPREHEN METABOLIC PANEL: CPT

## 2021-05-11 PROCEDURE — 99215 OFFICE O/P EST HI 40 MIN: CPT | Performed by: OBSTETRICS & GYNECOLOGY

## 2021-05-11 PROCEDURE — 83735 ASSAY OF MAGNESIUM: CPT

## 2021-05-11 PROCEDURE — 85025 COMPLETE CBC W/AUTO DIFF WBC: CPT

## 2021-05-11 RX ORDER — SODIUM CHLORIDE 0.9 % (FLUSH) 0.9 %
5-10 SYRINGE (ML) INJECTION AS NEEDED
Status: DISCONTINUED | OUTPATIENT
Start: 2021-05-11 | End: 2021-05-13 | Stop reason: HOSPADM

## 2021-05-11 RX ORDER — HEPARIN 100 UNIT/ML
500 SYRINGE INTRAVENOUS ONCE
Status: COMPLETED | OUTPATIENT
Start: 2021-05-11 | End: 2021-05-11

## 2021-05-11 RX ADMIN — HEPARIN 500 UNITS: 100 SYRINGE at 08:45

## 2021-05-11 RX ADMIN — Medication 10 ML: at 08:45

## 2021-05-11 NOTE — PROGRESS NOTES
Geoff Hudson is a 39 y.o. female presents in office for endometrial cancer, accompanied by her . Chief Complaint   Patient presents with    Uterine Cancer       Pt requests refill of magic mouthwash. Pt c/o neck and shoulder pain of 6/10. Pt c/o full body aches and pains, manageable with Lyrica. Pt c/o occasional mouth sores, has magic mouthwash. Pt states minimal decreased taste. Pt c/o insomnia, helped with Ambien 10 mg. Pt c/o fatigue, lasting longer after this treatment. Pt denies headache, dizziness, taste changes, hearing changes, vision changes, decreased appetite. Do you have any unusual vaginal bleeding, discharge or irritation? No  Do you have any changes in your bowel movements? Pt c/o constipation, treated with Sennakot. Have you been experiencing nausea or vomiting? Pt c/o nausea, treated with Zofran. Have you been experiencing any continuous or worsening abdominal pain? Pt c/o rib pain/aching after treatment, now resolved. Any urinary burning? No    Visit Vitals  /83   Pulse 94   Temp 98.1 °F (36.7 °C) (Oral)   Resp 16   Ht 5' 7\" (1.702 m)   Wt 70.8 kg (156 lb 0.7 oz)   SpO2 98%   BMI 24.44 kg/m²         1. Have you been to the ER, urgent care clinic since your last visit? Hospitalized since your last visit? No    2. Have you seen or consulted any other health care providers outside of the [address] [place] [address] since your last visit? Include any pap smears or colon screening.  No    3 most recent PHQ Screens 1/29/2021   Little interest or pleasure in doing things Not at all   Feeling down, depressed, irritable, or hopeless Not at all   Total Score PHQ 2 0       Learning Assessment 3/9/2021   PRIMARY LEARNER Patient   BARRIERS PRIMARY LEARNER NONE   CO-LEARNER CAREGIVER No   PRIMARY LANGUAGE ENGLISH   LEARNER PREFERENCE PRIMARY DEMONSTRATION   ANSWERED BY Patient   RELATIONSHIP SELF

## 2021-05-11 NOTE — PROGRESS NOTES
[facility]  [address] [place] [address], [address], [address] [facility]  [address] [facility], [address] [place] [address]  [facility]ph[facility] [place] [phone]  Angelita Washington DO        Patient ID:  Name: Radha Glez  MRM: [id number]  : 1980/41 y.o. Date: 2021    SUBJECTIVE:    This is a 39 y.o.  female with h/o Stage IIIBG1 endometrial caner s/p Robotic-assisted total laparoscopic hysterectomy, bilateral salpingo-oophorectomy, upper vaginectomy; bilateral pelvic and para-aortic lymph node dissection; peritoneal biopsies and washings; cystoscopy. on 2021  S/p cycle #4  of Carbo/taxol on 2021 with dose reduction of taxol at cycle #3. Appetite improved and gaining weight. Sleep is better with ambien 10 mg. Sleeping in a bit more. . Still having numbness in toes. Had one bout of nausea which passed. Also having upper shoulder and neck \"bone pain\" which is manageable. Component      Latest Ref Rng & Units 2021           9:00 AM   CA-125      1.5 - 35.0 U/mL 15     Component      Latest Ref Rng & Units 3/30/2021           9:00 AM   CA-125      1.5 - 35.0 U/mL 19     Component      Latest Ref Rng & Units 3/9/2021           9:19 AM   CA-125      1.5 - 35.0 U/mL 30     Component      Latest Ref Rng & Units 2021          11:45 AM   CA-125      1.5 - 35.0 U/mL 31       Her pathology revealed      A: CERVIX, UTERUS, BILATERAL FALLOPIAN TUBES, AND UPPER VAGINA, TOTAL HYSTERECTOMY WITH BILATERAL SALPINGO-OOPHORECTOMY AND PARTIAL VAGINECTOMY:   ENDOMETRIAL ENDOMETRIOID ADENOCARCINOMA, FIGO GRADE 1, WITH EXTENSIVE INVASION INTO THE CERVIX AND UPPER VAGINA (PLEASE SEE COMMENTS AND SYNOPTIC REPORT). SURGICAL MARGINS NEGATIVE. BENIGN BILATERAL FALLOPIAN TUBES AND OVARIES. B: PELVIC ADHESIONS, BIOPSY:   BENIGN FIBROUS TISSUE. NEGATIVE FOR MALIGNANCY. C: RIGHT PELVIC SIDEWALL PERITONEUM, BIOPSY:   ADENOCARCINOMA.    D: RECTOSIGMOID EPIPLOICAE ADHESION, BIOPSY:   ADENOCARCINOMA. E: VAGINA, BIOPSY:   ACUTE INFLAMMATORY TISSUE. NO CARCINOMA SEEN. F: RIGHT PELVIC LYMPH NODE, DISSECTION:   THIRTEEN (13) BENIGN LYMPH NODES. G: LEFT PELVIC LYMPH NODE, DISSECTION:   EIGHT (8) BENIGN LYMPH NODES. H: RIGHT PARA-AORTIC LYMPH NODE, EXCISION:   ONE (1) BENIGN LYMPH NODE. I: LEFT PARA-AORTIC LYMPH NODE, EXCISION:   BENIGN FIBROADIPOSE TISSUE. NO LYMPHOID TISSUE SEEN. DIAGNOSIS COMMENT:   The intraoperative consultation (specimen A) is corroborated. Immunohistochemistry results for DNA mismatch repair proteins will be reported as an addendum. ENDOMETRIUM   SPECIMEN   Procedure: Total hysterectomy and bilateral salpingo-oophorectomy   TUMOR   Histologic Type: Endometrioid carcinoma, NOS   Histologic Grade: FIGO grade 1   Myometrial Invasion: Present   Depth of Myometrial Invasion (Millimeters): 6 mm   Myometrial Thickness (Millimeters): 28 mm   Percentage of Myometrial Invasion: 21 %   Uterine Serosa Involvement: Not identified   Lower Uterine Segment Involvement: Present, myoinvasive   Cervical Stromal Involvement: Present   Other Tissue / Organ Involvement: Upper Vagina, Pelvic wall, Rectosigmoid adhesion   Peritoneal Ascitic Fluid: Atypical: favor reactive   Lymphovascular Invasion: Not identified   MARGINS   Margins: Ectocervical / Vaginal Cuff Margin: Uninvolved by carcinoma   LYMPH NODES   Lymph Node Status: All lymph nodes negative for tumor cells   Total Number of Pelvic Nodes Examined: 21   Total Number of Para-aortic Nodes Examined: 1   PATHOLOGIC STAGE CLASSIFICATION (pTNM, AJCC 8th Edition)   Primary Tumor (pT): pT3b   Regional Lymph Nodes (pN): pN0   Distant Metastasis (pM): pM1   Specify Site(s): Right Pelvic sidewall peritoneum   GROSS DESCRIPTION:   The specimen is received in nine parts.    TEST(S) PERFORMED   Test(s) Performed: Mismatch Repair   Immunohistochemistry (IHC) Testing for Mismatch Repair (MMR) Proteins: MLH1, MSH2, MSH6, PMS2   MLH1: Intact nuclear expression   MSH2: Loss of nuclear expression   MSH6: Loss of nuclear expression   PMS2: Intact nuclear expression     Medications:     Current Outpatient Medications on File Prior to Visit   Medication Sig Dispense Refill    LORazepam (Ativan) 0.5 mg tablet Take 1 Tab by mouth nightly as needed for Anxiety. Max Daily Amount: 0.5 mg. 30 Tab 0    ferrous sulfate 300 mg (60 mg iron)/5 mL syrup Take 5 mL by mouth daily for 180 days. 150 mL 5    lidocaine-prilocaine (EMLA) topical cream Apply  to affected area as needed for Pain. 30 g 0    cyclobenzaprine (FLEXERIL) 10 mg tablet Take  by mouth three (3) times daily as needed for Muscle Spasm(s).  oxyCODONE-acetaminophen (Percocet) 5-325 mg per tablet Take 1 Tab by mouth every four (4) hours as needed for Pain for up to 14 days. Max Daily Amount: 6 Tabs. 30 Tab 0    zolpidem (AMBIEN) 10 mg tablet Take 1 Tab by mouth nightly as needed for Sleep for up to 30 days. Max Daily Amount: 10 mg. 30 Tab 0    ondansetron hcl (ZOFRAN) 4 mg tablet Take 1 Tab by mouth every eight (8) hours as needed for Nausea or Vomiting. 30 Tab 2    magic mouthwash solution Magic mouth wash   Maalox  Lidocaine 2% viscous   Diphenhydramine oral solution     Pharmacy to mix equal portions of ingredients to a total volume as indicated in the dispense amount. Indications: stomatitis, a condition with painful swelling and sores inside the mouth, Swish and spit 5-10 mL PRN every 6 hours for mouth sores. 250 mL 0    pregabalin (LYRICA) 50 mg capsule Take 1 Cap by mouth two (2) times a day. Max Daily Amount: 100 mg. 60 Cap 3    zolpidem (Ambien) 5 mg tablet Take 1 Tab by mouth nightly as needed for Sleep. Max Daily Amount: 5 mg. 30 Tab 0    dexAMETHasone (DECADRON) 4 mg tablet Take 40 mg (10 tablets) the night before Cycle #1 and Cycle #2 chemotherapy treatments.  20 Tab 0    promethazine (PHENERGAN) 25 mg tablet Take 1 Tab by mouth every six (6) hours as needed for Nausea. 30 Tab 2    megestroL (MEGACE) 400 mg/10 mL (10 mL) suspension Take 10 mL by mouth two (2) times a day. 1 Bottle 3    ondansetron hcl (Zofran) 4 mg tablet Take 1 Tab by mouth every six (6) hours. 30 Tab 0    omeprazole (PriLOSEC OTC) 20 mg tablet Take 20 mg by mouth daily.  mv-min/iron/folic/calcium/vitK (WOMEN'S MULTIVITAMIN PO) Take  by mouth daily. No current facility-administered medications on file prior to visit. Allergies: Allergies   Allergen Reactions    Codeine Hives       OBJECTIVE:    Vitals:   Visit Vitals  /83   Pulse 94   Temp 98.1 °F (36.7 °C) (Oral)   Resp 16   Ht 5' 7\" (1.702 m)   Wt 70.8 kg (156 lb 0.7 oz)   LMP 01/09/2021 (Approximate)   SpO2 98%   BMI 24.44 kg/m²       Physical Examination:    General:  alert, cooperative, no distress   Abdomen: soft, bowel sounds active, non-tender   Incision: healed   Pelvic: Deferred    Rectal: not done   Extremity:   extremities normal, atraumatic, no cyanosis or edema     IMPRESSION/PLAN:    Stage IIIBG1 endometrial cancer  The operative procedures and clinical results have been reviewed with the patient. Previously Discussed recommendation for adjuvant chemotherapy  Plan for carbo auc=6, taxol 175 mg/m2 IV every 3 wks x 6 cycles. S/p cycle #4. Dose reduction to 135 mg/m2   Caris: BRCA2, MSI-H  Invitae: MSH2 pathogenic c/w park syndrome. Will refer to GI  Decreased appetite-improved  Tolerating chemo well. Chemo induced anemia: Hgb: 11.4.  will monitor. Also taking Iron  Constipation- senokot, colace-improved  Body aches- percocet prn. Insomnia- explained she could take ambien 10 mg qhs  Neuropathy- given G3 neuropathy will dose reduce taxol to 135 mg/m2.  Will start lyrica 50 mg bid- improved  Ok for cycle #5  F/u prior to cycle #6    Total time spent was 40 minutes regarding diagnosis of endometrial cancer and >50% of this time was spent counseling and coordinating care.  Augusta Emery DO  Gynecologic Oncology  5/11/2021/12:59 PM

## 2021-05-11 NOTE — TELEPHONE ENCOUNTER
Spoke with patient and reviewed holding chemo for 1 week due to low platelet count. Patient is concerned that platelets won't improve enough to have treatment next week and is inquiring about next steps if numbers are still down.

## 2021-05-11 NOTE — PROGRESS NOTES
[facility] Progress Note    Date: May 11, 2021    Name: Calvin Shay    MRN: [id number]         : 1980    Pre-chemo labs    Ms. Sinha was assessed and education was provided. Pt says she is having pain/numbness in her hand and feet which is relieved by lyrica. Pt denies any questions or concerns pertaining to tomorrows chemo. Ms. Enrique's vitals were reviewed and patient was observed for 5 minutes prior to treatment. Visit Vitals  /83 (BP 1 Location: Left arm, BP Patient Position: Sitting)   Pulse 94   Temp 98.1 °F (36.7 °C)   Resp 16   Ht 5' 7\" (1.702 m)   Wt 70.8 kg (156 lb)   SpO2 98%   Breastfeeding No   BMI 24.43 kg/m²         LCWMP accessed per protocol with 20 g x 0.75\" , flushes easily with brisk blood return verified after laying patient in a suspine position. Labs obtained per policy. Flushed with 10 NS and 500 units heparin instilled     Bandaid applied. Lab results were obtained and reviewed. Recent Results (from the past 12 hour(s))   CBC WITH 3 PART DIFF    Collection Time: 21  8:30 AM   Result Value Ref Range    WBC 3.4 (L) 4.5 - 13.0 K/uL    RBC 3.85 (L) 4.10 - 5.10 M/uL    HGB 11.9 (L) 12.0 - 16.0 g/dL    HCT 35.8 (L) 36 - 48 %    MCV 93.0 78 - 102 FL    MCH 30.9 25.0 - 35.0 PG    MCHC 33.2 31 - 37 g/dL    RDW 19.9 (H) 11.5 - 14.5 %    PLATELET 76 (L) 875 - 440 K/uL    NEUTROPHILS 56 40 - 70 %    MIXED CELLS 11 0.1 - 17 %    LYMPHOCYTES 33 14 - 44 %    ABS. NEUTROPHILS 1.9 1.8 - 9.5 K/UL    ABS. MIXED CELLS 0.4 0.0 - 2.3 K/uL    ABS.  LYMPHOCYTES 1.1 1.1 - 5.9 K/UL    DF AUTOMATED     MAGNESIUM    Collection Time: 21  8:30 AM   Result Value Ref Range    Magnesium 1.9 1.6 - 2.6 mg/dL   METABOLIC PANEL, COMPREHENSIVE    Collection Time: 21  8:30 AM   Result Value Ref Range    Sodium 143 136 - 145 mmol/L    Potassium 3.5 3.5 - 5.5 mmol/L    Chloride 108 100 - 111 mmol/L    CO2 27 21 - 32 mmol/L    Anion gap 8 3.0 - 18 mmol/L    Glucose 109 (H) 74 - 99 mg/dL    BUN 9 7.0 - 18 MG/DL    Creatinine 0.73 0.6 - 1.3 MG/DL    BUN/Creatinine ratio 12 12 - 20      GFR est AA >60 >60 ml/min/1.73m2    GFR est non-AA >60 >60 ml/min/1.73m2    Calcium 9.3 8.5 - 10.1 MG/DL    Bilirubin, total 0.3 0.2 - 1.0 MG/DL    ALT (SGPT) 29 13 - 56 U/L    AST (SGOT) 17 10 - 38 U/L    Alk. phosphatase 73 45 - 117 U/L    Protein, total 6.6 6.4 - 8.2 g/dL    Albumin 3.7 3.4 - 5.0 g/dL    Globulin 2.9 2.0 - 4.0 g/dL    A-G Ratio 1.3 0.8 - 1.7     URINALYSIS W/ RFLX MICROSCOPIC    Collection Time: 05/11/21  8:30 AM   Result Value Ref Range    Color DARK YELLOW      Appearance CLEAR      Specific gravity 1.016 1.005 - 1.030      pH (UA) 6.0 5.0 - 8.0      Protein Negative NEG mg/dL    Glucose Negative NEG mg/dL    Ketone Negative NEG mg/dL    Bilirubin Negative NEG      Blood Negative NEG      Urobilinogen 0.2 0.2 - 1.0 EU/dL    Nitrites Negative NEG      Leukocyte Esterase Negative NEG       Platelets of 76 reported to MD Genoveva Phillips who orders to hold chemo for one week. See order. Ms. Yayo Gunderson tolerated the infusion, and had no complaints. Patient armband removed and shredded. Ms. Yayo Gunderson was discharged from [place] [address] in stable condition at [address] [place] [address].  She is to return on 5/18/2021 at 0800 for her next appointment    Baron Levon RN  May 11, 2021

## 2021-05-12 ENCOUNTER — HOSPITAL ENCOUNTER (OUTPATIENT)
Dept: INFUSION THERAPY | Age: 41
End: 2021-05-12

## 2021-05-12 LAB
BACTERIA SPEC CULT: NORMAL
SERVICE CMNT-IMP: NORMAL

## 2021-05-13 NOTE — TELEPHONE ENCOUNTER
After Visit Summary   5/26/2017    Adria Larry    MRN: [id number]           Patient Information     Date Of Birth          1959        Visit Information        Provider Department      5/26/2017 8:30 AM Eugenie Hilton APRN [facility]         Care Instructions      Adult Self-Care for Colds  Colds are caused by viruses. They can t be cured with antibiotics. However, you can relieve symptoms and support your body s efforts to heal itself. No matter which symptoms you have, be sure to drink plenty of fluids (water or clear soup); stop smoking and drinking alcohol; and get plenty of rest.   Understand a fever    Take your temperature several times a day. If your fever is 100.4 F (38.0 C) for more than a day, call your doctor.    Relax, lie down. Go to bed if you want. Just get off your feet and rest. Also, drink plenty of fluids to avoid dehydration.    Take acetaminophen or a nonsteroidal anti-inflammatory agent (NSAID), such as ibuprofen.  Treat a troubled nose kindly    Breathe steam or heated humidified air to open blocked nasal passages.  a hot shower or use a vaporizer. Be careful not to get burned by the steam.    Saline nasal sprays and decongestant tablets help open a stuffy nose. Antihistamines can also help, but they can cause side effects such as drowsiness and drying of the eyes, nose, and mouth.  Soothe a sore throat and cough    Gargle every 2 hours with 1/4 teaspoon of salt dissolved in 1/2 cup of warm water. Suck on throat lozenges and cough drops to moisten your throat.    Cough medicines are available but it is unclear how effective they actually are.    Take acetaminophen or an NSAID, such as ibuprofen to ease throat pain  Ease digestive problems    Put fluid back into your body. Take frequent sips of clear liquids such as water or broth. Do not drink beverages with a lot of sugar in them, such as juices and sodas. These can  Dr. Lita Lacy called and discussed with patient. make diarrhea worse. Older children and adults can drink sports drinks.    As your appetite returns, you can resume your normal diet. Ask your doctor whether there are any foods you should avoid.     When to seek medical care  When you first notice symptoms, ask your health care provider if antiviral medications are appropriate. Antibiotics should not be taken for colds or flu. Also, call your doctor if you have any of the following symptoms or if you aren t feeling better after 7 days:    Shortness of breath    Pain or pressure in the chest or abdomen    Worsening symptoms, especially after a period of improvement    Fever of 100.4 F  (38.0 C) or higher, or fever that doesn t go down with medication    Sudden dizziness or confusion    Severe or continued vomiting    Signs of dehydration, including extreme thirst, dark urine, infrequent urination, dry mouth    Spotted, red, or very sore throat        8749-4065 [employer]. [address] [place] [address]. All rights reserved. This information is not intended as a substitute for professional medical care. Always follow your healthcare professional's instructions.        Adult Self-Care for Colds  Colds are caused by viruses. They can t be cured with antibiotics. However, you can relieve symptoms and support your body s efforts to heal itself. No matter which symptoms you have, be sure to drink plenty of fluids (water or clear soup); stop smoking and drinking alcohol; and get plenty of rest.   Understand a fever    Take your temperature several times a day. If your fever is 100.4 F (38.0 C) for more than a day, call your doctor.    Relax, lie down. Go to bed if you want. Just get off your feet and rest. Also, drink plenty of fluids to avoid dehydration.    Take acetaminophen or a nonsteroidal anti-inflammatory agent (NSAID), such as ibuprofen.  Treat a troubled nose kindly    Breathe steam or heated humidified air to open blocked nasal  passages.  a hot shower or use a vaporizer. Be careful not to get burned by the steam.    Saline nasal sprays and decongestant tablets help open a stuffy nose. Antihistamines can also help, but they can cause side effects such as drowsiness and drying of the eyes, nose, and mouth.  Soothe a sore throat and cough    Gargle every 2 hours with 1/4 teaspoon of salt dissolved in 1/2 cup of warm water. Suck on throat lozenges and cough drops to moisten your throat.    Cough medicines are available but it is unclear how effective they actually are.    Take acetaminophen or an NSAID, such as ibuprofen to ease throat pain  Ease digestive problems    Put fluid back into your body. Take frequent sips of clear liquids such as water or broth. Do not drink beverages with a lot of sugar in them, such as juices and sodas. These can make diarrhea worse. Older children and adults can drink sports drinks.    As your appetite returns, you can resume your normal diet. Ask your doctor whether there are any foods you should avoid.     When to seek medical care  When you first notice symptoms, ask your health care provider if antiviral medications are appropriate. Antibiotics should not be taken for colds or flu. Also, call your doctor if you have any of the following symptoms or if you aren t feeling better after 7 days:    Shortness of breath    Pain or pressure in the chest or abdomen    Worsening symptoms, especially after a period of improvement    Fever of 100.4 F  (38.0 C) or higher, or fever that doesn t go down with medication    Sudden dizziness or confusion    Severe or continued vomiting    Signs of dehydration, including extreme thirst, dark urine, infrequent urination, dry mouth    Spotted, red, or very sore throat        0158-1636 [employer]. [address] [place] [address], [place] [address]. All rights reserved. This information is not intended as a substitute for professional medical care. Always follow your  "healthcare professional's instructions.                Follow-ups after your visit        Your next 10 appointments already scheduled     May 26, 2017  8:30 AM CDT   School Visit with JEN Bettencourt CNP   [facility]  ([facility] )    [address] [address] [phone]              Who to contact     If you have questions or need follow up information about today's clinic visit or your schedule please contact [facility]  directly at [phone].  Normal or non-critical lab and imaging results will be communicated to you by ShareMemehart, letter or phone within 4 business days after the clinic has received the results. If you do not hear from us within 7 days, please contact the clinic through ShareMemehart or phone. If you have a critical or abnormal lab result, we will notify you by phone as soon as possible.  Submit refill requests through OrangeSlyce or call your pharmacy and they will forward the refill request to us. Please allow 3 business days for your refill to be completed.          Additional Information About Your Visit        MyChart Information     [employer] gives you secure access to your electronic health record. If you see a primary care provider, you can also send messages to your care team and make appointments. If you have questions, please call your primary care clinic.  If you do not have a primary care provider, please call [phone] and they will assist you.        Care EveryWhere ID     This is your Care EveryWhere ID. This could be used by other organizations to access your [facility] medical records  [id number]        Your Vitals Were     Pulse Temperature Respirations Height Pulse Oximetry BMI (Body Mass Index)    63 97.7  F (36.5  C) (Tympanic) 16 5' 10\" (1.778 m) 96% 32.37 kg/m2       Blood Pressure from Last 3 Encounters:   05/26/17 138/72   04/10/17 146/90   12/21/16 144/85 "    Weight from Last 3 Encounters:   05/26/17 225 lb 9.6 oz (102.3 kg)   04/10/17 221 lb (100.2 kg)   12/21/16 226 lb 3.2 oz (102.6 kg)              Today, you had the following     No orders found for display       Primary Care Provider Office Phone # Fax #    Steven Duane Semmler, [phone]       [facility] [address] [facility] [address]        Thank you!     Thank you for choosing [facility]  for your care. Our goal is always to provide you with excellent care. Hearing back from our patients is one way we can continue to improve our services. Please take a few minutes to complete the written survey that you may receive in the mail after your visit with us. Thank you!             Your Updated Medication List - Protect others around you: Learn how to safely use, store and throw away your medicines at www.disposemymeds.org.          This list is accurate as of: 5/26/17  7:50 AM.  Always use your most recent med list.                   Brand Name Dispense Instructions for use    aspirin 81 MG tablet      1 TABLET DAILY       FISH OIL PO      1000mg daily       FLAXSEED OIL CAPS 500-5 MG-IU OR      1200mg twice daily       Multi-vitamin Tabs tablet   Generic drug:  multivitamin, therapeutic with minerals      1 TABLET DAILY       PREVACID PO      30 mg daily       SYNTHROID PO      75 MCG ORALLY DAILY ON AN EMPTY STOMACH

## 2021-05-14 RX ORDER — PROCHLORPERAZINE EDISYLATE 5 MG/ML
10 INJECTION INTRAMUSCULAR; INTRAVENOUS
Status: CANCELLED
Start: 2021-05-19

## 2021-05-14 RX ORDER — ONDANSETRON 2 MG/ML
8 INJECTION INTRAMUSCULAR; INTRAVENOUS AS NEEDED
Status: CANCELLED | OUTPATIENT
Start: 2021-05-19

## 2021-05-14 RX ORDER — HYDROCORTISONE SODIUM SUCCINATE 100 MG/2ML
100 INJECTION, POWDER, FOR SOLUTION INTRAMUSCULAR; INTRAVENOUS AS NEEDED
Status: CANCELLED | OUTPATIENT
Start: 2021-05-19

## 2021-05-14 RX ORDER — DIPHENHYDRAMINE HYDROCHLORIDE 50 MG/ML
25 INJECTION, SOLUTION INTRAMUSCULAR; INTRAVENOUS AS NEEDED
Status: CANCELLED
Start: 2021-05-19

## 2021-05-14 RX ORDER — ACETAMINOPHEN 325 MG/1
650 TABLET ORAL AS NEEDED
Status: CANCELLED
Start: 2021-05-19

## 2021-05-14 RX ORDER — EPINEPHRINE 1 MG/ML
0.3 INJECTION, SOLUTION, CONCENTRATE INTRAVENOUS AS NEEDED
Status: CANCELLED | OUTPATIENT
Start: 2021-05-19

## 2021-05-14 RX ORDER — DIPHENHYDRAMINE HYDROCHLORIDE 50 MG/ML
50 INJECTION, SOLUTION INTRAMUSCULAR; INTRAVENOUS AS NEEDED
Status: CANCELLED
Start: 2021-05-19

## 2021-05-14 RX ORDER — ALBUTEROL SULFATE 0.83 MG/ML
2.5 SOLUTION RESPIRATORY (INHALATION) AS NEEDED
Status: CANCELLED
Start: 2021-05-19

## 2021-05-14 RX ORDER — MAGNESIUM SULFATE HEPTAHYDRATE 40 MG/ML
2 INJECTION, SOLUTION INTRAVENOUS
Status: CANCELLED
Start: 2021-05-19

## 2021-05-14 RX ORDER — POTASSIUM CHLORIDE 7.45 MG/ML
10 INJECTION INTRAVENOUS
Status: CANCELLED
Start: 2021-05-19

## 2021-05-18 ENCOUNTER — HOSPITAL ENCOUNTER (OUTPATIENT)
Dept: INFUSION THERAPY | Age: 41
Discharge: HOME | End: 2021-05-18
Payer: COMMERCIAL

## 2021-05-18 VITALS
DIASTOLIC BLOOD PRESSURE: 74 MMHG | OXYGEN SATURATION: 98 % | RESPIRATION RATE: 16 BRPM | HEART RATE: 105 BPM | SYSTOLIC BLOOD PRESSURE: 118 MMHG | TEMPERATURE: 98.5 F

## 2021-05-18 DIAGNOSIS — G89.3 CANCER ASSOCIATED PAIN: ICD-10-CM

## 2021-05-18 LAB
ALBUMIN SERPL-MCNC: 3.8 G/DL (ref 3.4–5)
ALBUMIN/GLOB SERPL: 1.3 {RATIO} (ref 0.8–1.7)
ALP SERPL-CCNC: 75 U/L (ref 45–117)
ALT SERPL-CCNC: 32 U/L (ref 13–56)
ANION GAP SERPL CALC-SCNC: 8 MMOL/L (ref 3–18)
APPEARANCE UR: CLEAR
AST SERPL-CCNC: 20 U/L (ref 10–38)
BASOPHILS # BLD: 0 K/UL (ref 0–0.1)
BASOPHILS NFR BLD: 1 % (ref 0–2)
BILIRUB SERPL-MCNC: 0.3 MG/DL (ref 0.2–1)
BILIRUB UR QL: NEGATIVE
BUN SERPL-MCNC: 16 MG/DL (ref 7–18)
BUN/CREAT SERPL: 19 (ref 12–20)
CALCIUM SERPL-MCNC: 8.8 MG/DL (ref 8.5–10.1)
CHLORIDE SERPL-SCNC: 108 MMOL/L (ref 100–111)
CO2 SERPL-SCNC: 26 MMOL/L (ref 21–32)
COLOR UR: NORMAL
CREAT SERPL-MCNC: 0.83 MG/DL (ref 0.6–1.3)
DIFFERENTIAL METHOD BLD: ABNORMAL
EOSINOPHIL # BLD: 0.1 K/UL (ref 0–0.4)
EOSINOPHIL NFR BLD: 1 % (ref 0–5)
ERYTHROCYTE [DISTWIDTH] IN BLOOD BY AUTOMATED COUNT: 17.3 % (ref 11.6–14.5)
GLOBULIN SER CALC-MCNC: 2.9 G/DL (ref 2–4)
GLUCOSE SERPL-MCNC: 92 MG/DL (ref 74–99)
GLUCOSE UR STRIP.AUTO-MCNC: NEGATIVE MG/DL
HCT VFR BLD AUTO: 37.7 % (ref 35–45)
HGB BLD-MCNC: 12.2 G/DL (ref 12–16)
HGB UR QL STRIP: NEGATIVE
KETONES UR QL STRIP.AUTO: NEGATIVE MG/DL
LEUKOCYTE ESTERASE UR QL STRIP.AUTO: NEGATIVE
LYMPHOCYTES # BLD: 1.2 K/UL (ref 0.9–3.6)
LYMPHOCYTES NFR BLD: 26 % (ref 21–52)
MAGNESIUM SERPL-MCNC: 1.9 MG/DL (ref 1.6–2.6)
MCH RBC QN AUTO: 31.9 PG (ref 24–34)
MCHC RBC AUTO-ENTMCNC: 32.4 G/DL (ref 31–37)
MCV RBC AUTO: 98.4 FL (ref 74–97)
MONOCYTES # BLD: 0.3 K/UL (ref 0.05–1.2)
MONOCYTES NFR BLD: 7 % (ref 3–10)
NEUTS SEG # BLD: 2.8 K/UL (ref 1.8–8)
NEUTS SEG NFR BLD: 64 % (ref 40–73)
NITRITE UR QL STRIP.AUTO: NEGATIVE
PH UR STRIP: 6.5 [PH] (ref 5–8)
PLATELET # BLD AUTO: 94 K/UL (ref 135–420)
PMV BLD AUTO: 10.9 FL (ref 9.2–11.8)
POTASSIUM SERPL-SCNC: 3.7 MMOL/L (ref 3.5–5.5)
PROT SERPL-MCNC: 6.7 G/DL (ref 6.4–8.2)
PROT UR STRIP-MCNC: NEGATIVE MG/DL
RBC # BLD AUTO: 3.83 M/UL (ref 4.2–5.3)
SODIUM SERPL-SCNC: 142 MMOL/L (ref 136–145)
SP GR UR REFRACTOMETRY: 1.02 (ref 1–1.03)
UROBILINOGEN UR QL STRIP.AUTO: 0.2 EU/DL (ref 0.2–1)
WBC # BLD AUTO: 4.4 K/UL (ref 4.6–13.2)

## 2021-05-18 PROCEDURE — 36591 DRAW BLOOD OFF VENOUS DEVICE: CPT

## 2021-05-18 PROCEDURE — 83735 ASSAY OF MAGNESIUM: CPT

## 2021-05-18 PROCEDURE — 85025 COMPLETE CBC W/AUTO DIFF WBC: CPT

## 2021-05-18 PROCEDURE — 80053 COMPREHEN METABOLIC PANEL: CPT

## 2021-05-18 PROCEDURE — 87086 URINE CULTURE/COLONY COUNT: CPT

## 2021-05-18 PROCEDURE — 74011250636 HC RX REV CODE- 250/636: Performed by: OBSTETRICS & GYNECOLOGY

## 2021-05-18 PROCEDURE — 81003 URINALYSIS AUTO W/O SCOPE: CPT

## 2021-05-18 PROCEDURE — 77030012965 HC NDL HUBR BBMI -A

## 2021-05-18 RX ORDER — OXYCODONE AND ACETAMINOPHEN 5; 325 MG/1; MG/1
1 TABLET ORAL
Qty: 30 TAB | Refills: 0 | Status: SHIPPED | OUTPATIENT
Start: 2021-05-18 | End: 2021-06-01

## 2021-05-18 RX ORDER — SODIUM CHLORIDE 0.9 % (FLUSH) 0.9 %
5-10 SYRINGE (ML) INJECTION AS NEEDED
Status: DISCONTINUED | OUTPATIENT
Start: 2021-05-18 | End: 2021-05-20 | Stop reason: HOSPADM

## 2021-05-18 RX ORDER — HEPARIN 100 UNIT/ML
500 SYRINGE INTRAVENOUS ONCE
Status: COMPLETED | OUTPATIENT
Start: 2021-05-18 | End: 2021-05-18

## 2021-05-18 RX ADMIN — Medication 10 ML: at 09:15

## 2021-05-18 RX ADMIN — HEPARIN 500 UNITS: 100 SYRINGE at 09:15

## 2021-05-18 NOTE — PROGRESS NOTES
[facility] Progress Note     Date: May 11, 2021     Name: Mora Salmeron     MRN: [id number]                                                             : 1980     Pre-chemo labs     Ms. PATE [facility] was assessed and education was provided. Pt says she is having pain/numbness in her hand and feet which is relieved by lyrica.      Pt denies any questions or concerns pertaining to tomorrows chemo.     Ms. Enrique's vitals were reviewed and patient was observed for 5 minutes prior to treatment. Visit Vitals  /83 (BP 1 Location: Left arm, BP Patient Position: Sitting)   Pulse 94   Temp 98.1 °F (36.7 °C)   Resp 16   Ht 5' 7\" (1.702 m)   Wt 70.8 kg (156 lb)   SpO2 98%   Breastfeeding No   BMI 24.43 kg/m²            LCWMP accessed per protocol with 20 g x 0.75\" , flushes easily with brisk blood return verified after laying patient in a suspine position. Labs obtained per policy. Flushed with 10 NS and 500 units heparin instilled      Bandaid applied.       Ms. Enrique tolerated the infusion, and had no complaints. Patient armband removed and shredded.     Ms. PATE [facility] was discharged from [place] [address] in stable condition at [address] [place] [address].  She is to return on 2021 at 0800 for her next appointment

## 2021-05-19 ENCOUNTER — HOSPITAL ENCOUNTER (OUTPATIENT)
Dept: INFUSION THERAPY | Age: 41
Discharge: HOME | End: 2021-05-19
Payer: COMMERCIAL

## 2021-05-19 VITALS
DIASTOLIC BLOOD PRESSURE: 82 MMHG | RESPIRATION RATE: 16 BRPM | HEART RATE: 92 BPM | TEMPERATURE: 97.7 F | SYSTOLIC BLOOD PRESSURE: 116 MMHG

## 2021-05-19 DIAGNOSIS — C54.1 ENDOMETRIAL CANCER (HCC): Primary | ICD-10-CM

## 2021-05-19 LAB
BACTERIA SPEC CULT: NORMAL
SERVICE CMNT-IMP: NORMAL

## 2021-05-19 PROCEDURE — 96367 TX/PROPH/DG ADDL SEQ IV INF: CPT

## 2021-05-19 PROCEDURE — 96413 CHEMO IV INFUSION 1 HR: CPT

## 2021-05-19 PROCEDURE — 74011000258 HC RX REV CODE- 258: Performed by: OBSTETRICS & GYNECOLOGY

## 2021-05-19 PROCEDURE — 96417 CHEMO IV INFUS EACH ADDL SEQ: CPT

## 2021-05-19 PROCEDURE — 74011250636 HC RX REV CODE- 250/636: Performed by: OBSTETRICS & GYNECOLOGY

## 2021-05-19 PROCEDURE — 96415 CHEMO IV INFUSION ADDL HR: CPT

## 2021-05-19 PROCEDURE — 96375 TX/PRO/DX INJ NEW DRUG ADDON: CPT

## 2021-05-19 PROCEDURE — 77030012965 HC NDL HUBR BBMI -A

## 2021-05-19 RX ORDER — DIPHENHYDRAMINE HYDROCHLORIDE 50 MG/ML
50 INJECTION, SOLUTION INTRAMUSCULAR; INTRAVENOUS ONCE
Status: COMPLETED | OUTPATIENT
Start: 2021-05-19 | End: 2021-05-19

## 2021-05-19 RX ORDER — SODIUM CHLORIDE 9 MG/ML
25 INJECTION, SOLUTION INTRAVENOUS CONTINUOUS
Status: DISPENSED | OUTPATIENT
Start: 2021-05-19 | End: 2021-05-19

## 2021-05-19 RX ORDER — SODIUM CHLORIDE 9 MG/ML
10 INJECTION INTRAMUSCULAR; INTRAVENOUS; SUBCUTANEOUS AS NEEDED
Status: ACTIVE | OUTPATIENT
Start: 2021-05-19 | End: 2021-05-19

## 2021-05-19 RX ORDER — LORAZEPAM 2 MG/ML
0.26 INJECTION INTRAMUSCULAR ONCE
Status: COMPLETED | OUTPATIENT
Start: 2021-05-19 | End: 2021-05-19

## 2021-05-19 RX ORDER — SODIUM CHLORIDE 0.9 % (FLUSH) 0.9 %
10 SYRINGE (ML) INJECTION AS NEEDED
Status: DISPENSED | OUTPATIENT
Start: 2021-05-19 | End: 2021-05-19

## 2021-05-19 RX ORDER — HEPARIN 100 UNIT/ML
300-500 SYRINGE INTRAVENOUS AS NEEDED
Status: DISPENSED | OUTPATIENT
Start: 2021-05-19 | End: 2021-05-19

## 2021-05-19 RX ADMIN — PACLITAXEL 222 MG: 6 INJECTION, SOLUTION INTRAVENOUS at 09:46

## 2021-05-19 RX ADMIN — Medication 10 ML: at 15:01

## 2021-05-19 RX ADMIN — FAMOTIDINE 20 MG: 10 INJECTION INTRAVENOUS at 09:12

## 2021-05-19 RX ADMIN — SODIUM CHLORIDE 10 ML: 9 INJECTION INTRAMUSCULAR; INTRAVENOUS; SUBCUTANEOUS at 14:15

## 2021-05-19 RX ADMIN — DEXAMETHASONE SODIUM PHOSPHATE: 10 INJECTION INTRAMUSCULAR; INTRAVENOUS at 08:54

## 2021-05-19 RX ADMIN — DIPHENHYDRAMINE HYDROCHLORIDE 50 MG: 50 INJECTION INTRAMUSCULAR; INTRAVENOUS at 09:10

## 2021-05-19 RX ADMIN — LORAZEPAM 0.26 MG: 2 INJECTION INTRAMUSCULAR; INTRAVENOUS at 09:13

## 2021-05-19 RX ADMIN — SODIUM CHLORIDE 700 MG: 900 INJECTION, SOLUTION INTRAVENOUS at 12:57

## 2021-05-19 RX ADMIN — SODIUM CHLORIDE 150 MG: 900 INJECTION, SOLUTION INTRAVENOUS at 08:15

## 2021-05-19 RX ADMIN — SODIUM CHLORIDE 500 ML: 9 INJECTION, SOLUTION INTRAVENOUS at 08:20

## 2021-05-19 RX ADMIN — SODIUM CHLORIDE 25 ML/HR: 9 INJECTION, SOLUTION INTRAVENOUS at 08:19

## 2021-05-19 NOTE — PROGRESS NOTES
[facility] Progress Note    Date: May 19, 2021    Name: Hammad Del Castillo              MRN: [id number]              : 1980    Chemotherapy Cycle: C5D1  Taxol/Carbo     Ms. Calixto Gallardo was assessed and education was provided. Pt with platelet level off 94; MD Pebbles Mckee aware; see order to roceed with treatment in patient's chart. Ms. Enrique's vitals were reviewed. Visit Vitals  /82   Pulse 92   Temp 97.7 °F (36.5 °C)   Resp 16     LCWMP accessed per protocol, flushes easily with brisk blood return.  ml bolus infusing per order.     Pre-medications were administered as follows:     IV Emend 150mg   IV Decadron 12mg/Zofran 16mg   IV Pepcid 20mg  IV Benadryl 50mg  IV Ativan 0.26 mg     Paclitaxel 222 mg (dose reduced due to neuropathy) was infused over 3 hours per order.     VS remain stable, pt denies any s/s of an allergic reaction, denies CP, SOB, itching, hives, facial swelling or flushing.     Carboplatin 700mg infused over 1 hour per order.     VS remain stable, pt denies any s/s of an allergic reaction, denies CP, SOB, itching, hives, facial swelling or flushing.      Port flushed with NS and heparin per protocol and de-accessed. Band aid declined by patient.     Ms. Enrique tolerated infusion, and had no complaints at this time. Patient armband removed and shredded. Ms. Calixto Gallardo was discharged from [place] [address] in stable condition at 26. She is to return on 21 for her next appointment for pre-chemo labs.     Dolores Hein RN  May 19, 2021  12:04 PM

## 2021-05-28 ENCOUNTER — PATIENT MESSAGE (OUTPATIENT)
Dept: ONCOLOGY | Age: 41
End: 2021-05-28

## 2021-05-28 DIAGNOSIS — F51.01 PRIMARY INSOMNIA: ICD-10-CM

## 2021-05-28 DIAGNOSIS — G62.9 NEUROPATHY: ICD-10-CM

## 2021-05-31 ENCOUNTER — APPOINTMENT (OUTPATIENT)
Dept: INFUSION THERAPY | Age: 41
End: 2021-05-31

## 2021-06-01 ENCOUNTER — APPOINTMENT (OUTPATIENT)
Dept: INFUSION THERAPY | Age: 41
End: 2021-06-01

## 2021-06-02 ENCOUNTER — APPOINTMENT (OUTPATIENT)
Dept: INFUSION THERAPY | Age: 41
End: 2021-06-02

## 2021-06-02 RX ORDER — ZOLPIDEM TARTRATE 10 MG/1
10 TABLET ORAL
Qty: 30 TABLET | Refills: 0 | Status: SHIPPED | OUTPATIENT
Start: 2021-06-02 | End: 2021-06-22 | Stop reason: SDUPTHER

## 2021-06-02 RX ORDER — PREGABALIN 50 MG/1
50 CAPSULE ORAL 2 TIMES DAILY
Qty: 60 CAPSULE | Refills: 3 | Status: SHIPPED | OUTPATIENT
Start: 2021-06-02 | End: 2021-06-15

## 2021-06-08 ENCOUNTER — APPOINTMENT (OUTPATIENT)
Dept: INFUSION THERAPY | Age: 41
End: 2021-06-08

## 2021-06-09 ENCOUNTER — APPOINTMENT (OUTPATIENT)
Dept: INFUSION THERAPY | Age: 41
End: 2021-06-09

## 2021-06-09 RX ORDER — DIPHENHYDRAMINE HYDROCHLORIDE 50 MG/ML
50 INJECTION, SOLUTION INTRAMUSCULAR; INTRAVENOUS AS NEEDED
Status: CANCELLED
Start: 2021-06-16

## 2021-06-09 RX ORDER — ALBUTEROL SULFATE 0.83 MG/ML
2.5 SOLUTION RESPIRATORY (INHALATION) AS NEEDED
Status: CANCELLED
Start: 2021-06-16

## 2021-06-09 RX ORDER — MAGNESIUM SULFATE HEPTAHYDRATE 40 MG/ML
2 INJECTION, SOLUTION INTRAVENOUS
Status: CANCELLED
Start: 2021-06-16

## 2021-06-09 RX ORDER — ACETAMINOPHEN 325 MG/1
650 TABLET ORAL AS NEEDED
Status: CANCELLED
Start: 2021-06-16

## 2021-06-09 RX ORDER — ONDANSETRON 2 MG/ML
8 INJECTION INTRAMUSCULAR; INTRAVENOUS AS NEEDED
Status: CANCELLED | OUTPATIENT
Start: 2021-06-16

## 2021-06-09 RX ORDER — EPINEPHRINE 1 MG/ML
0.3 INJECTION, SOLUTION, CONCENTRATE INTRAVENOUS AS NEEDED
Status: CANCELLED | OUTPATIENT
Start: 2021-06-16

## 2021-06-09 RX ORDER — SODIUM CHLORIDE 0.9 % (FLUSH) 0.9 %
10 SYRINGE (ML) INJECTION AS NEEDED
Status: CANCELLED | OUTPATIENT
Start: 2021-06-16

## 2021-06-09 RX ORDER — DIPHENHYDRAMINE HYDROCHLORIDE 50 MG/ML
25 INJECTION, SOLUTION INTRAMUSCULAR; INTRAVENOUS AS NEEDED
Status: CANCELLED
Start: 2021-06-16

## 2021-06-09 RX ORDER — PROCHLORPERAZINE EDISYLATE 5 MG/ML
10 INJECTION INTRAMUSCULAR; INTRAVENOUS
Status: CANCELLED
Start: 2021-06-16

## 2021-06-09 RX ORDER — HYDROCORTISONE SODIUM SUCCINATE 100 MG/2ML
100 INJECTION, POWDER, FOR SOLUTION INTRAMUSCULAR; INTRAVENOUS AS NEEDED
Status: CANCELLED | OUTPATIENT
Start: 2021-06-16

## 2021-06-09 RX ORDER — HEPARIN 100 UNIT/ML
300-500 SYRINGE INTRAVENOUS AS NEEDED
Status: CANCELLED
Start: 2021-06-16

## 2021-06-09 RX ORDER — LORAZEPAM 2 MG/ML
0.26 INJECTION INTRAMUSCULAR ONCE
Status: CANCELLED
Start: 2021-06-16 | End: 2021-06-16

## 2021-06-09 RX ORDER — POTASSIUM CHLORIDE 7.45 MG/ML
10 INJECTION INTRAVENOUS
Status: CANCELLED
Start: 2021-06-16

## 2021-06-09 RX ORDER — SODIUM CHLORIDE 9 MG/ML
10 INJECTION INTRAMUSCULAR; INTRAVENOUS; SUBCUTANEOUS AS NEEDED
Status: CANCELLED | OUTPATIENT
Start: 2021-06-16

## 2021-06-09 RX ORDER — DIPHENHYDRAMINE HYDROCHLORIDE 50 MG/ML
50 INJECTION, SOLUTION INTRAMUSCULAR; INTRAVENOUS ONCE
Status: CANCELLED | OUTPATIENT
Start: 2021-06-16 | End: 2021-06-16

## 2021-06-09 RX ORDER — SODIUM CHLORIDE 9 MG/ML
25 INJECTION, SOLUTION INTRAVENOUS CONTINUOUS
Status: CANCELLED | OUTPATIENT
Start: 2021-06-16

## 2021-06-15 ENCOUNTER — OFFICE VISIT (OUTPATIENT)
Dept: ONCOLOGY | Age: 41
End: 2021-06-15
Payer: COMMERCIAL

## 2021-06-15 ENCOUNTER — TELEPHONE (OUTPATIENT)
Dept: ONCOLOGY | Age: 41
End: 2021-06-15

## 2021-06-15 ENCOUNTER — DOCUMENTATION ONLY (OUTPATIENT)
Dept: ONCOLOGY | Age: 41
End: 2021-06-15

## 2021-06-15 ENCOUNTER — HOSPITAL ENCOUNTER (OUTPATIENT)
Dept: INFUSION THERAPY | Age: 41
Discharge: HOME | End: 2021-06-15
Payer: COMMERCIAL

## 2021-06-15 VITALS
TEMPERATURE: 98.9 F | OXYGEN SATURATION: 98 % | SYSTOLIC BLOOD PRESSURE: 118 MMHG | RESPIRATION RATE: 16 BRPM | HEIGHT: 67 IN | DIASTOLIC BLOOD PRESSURE: 77 MMHG | BODY MASS INDEX: 26.37 KG/M2 | HEART RATE: 107 BPM | WEIGHT: 168 LBS

## 2021-06-15 VITALS
OXYGEN SATURATION: 98 % | WEIGHT: 168 LBS | TEMPERATURE: 98.9 F | BODY MASS INDEX: 26.31 KG/M2 | HEART RATE: 107 BPM | SYSTOLIC BLOOD PRESSURE: 118 MMHG | RESPIRATION RATE: 16 BRPM | DIASTOLIC BLOOD PRESSURE: 77 MMHG

## 2021-06-15 DIAGNOSIS — C54.1 ENDOMETRIAL CANCER (HCC): Primary | ICD-10-CM

## 2021-06-15 DIAGNOSIS — G62.9 NEUROPATHY: ICD-10-CM

## 2021-06-15 LAB
ALBUMIN SERPL-MCNC: 3.8 G/DL (ref 3.4–5)
ALBUMIN/GLOB SERPL: 1.4 {RATIO} (ref 0.8–1.7)
ALP SERPL-CCNC: 76 U/L (ref 45–117)
ALT SERPL-CCNC: 24 U/L (ref 13–56)
ANION GAP SERPL CALC-SCNC: 5 MMOL/L (ref 3–18)
APPEARANCE UR: CLEAR
AST SERPL-CCNC: 14 U/L (ref 10–38)
BASO+EOS+MONOS # BLD AUTO: 0.4 K/UL (ref 0–2.3)
BASO+EOS+MONOS NFR BLD AUTO: 10 % (ref 0.1–17)
BILIRUB SERPL-MCNC: 0.3 MG/DL (ref 0.2–1)
BILIRUB UR QL: NEGATIVE
BUN SERPL-MCNC: 13 MG/DL (ref 7–18)
BUN/CREAT SERPL: 16 (ref 12–20)
CALCIUM SERPL-MCNC: 9.1 MG/DL (ref 8.5–10.1)
CHLORIDE SERPL-SCNC: 107 MMOL/L (ref 100–111)
CO2 SERPL-SCNC: 28 MMOL/L (ref 21–32)
COLOR UR: NORMAL
CREAT SERPL-MCNC: 0.79 MG/DL (ref 0.6–1.3)
DIFFERENTIAL METHOD BLD: ABNORMAL
ERYTHROCYTE [DISTWIDTH] IN BLOOD BY AUTOMATED COUNT: 14.2 % (ref 11.5–14.5)
GLOBULIN SER CALC-MCNC: 2.8 G/DL (ref 2–4)
GLUCOSE SERPL-MCNC: 92 MG/DL (ref 74–99)
GLUCOSE UR STRIP.AUTO-MCNC: NEGATIVE MG/DL
HCT VFR BLD AUTO: 37.9 % (ref 36–48)
HGB BLD-MCNC: 12.8 G/DL (ref 12–16)
HGB UR QL STRIP: NEGATIVE
KETONES UR QL STRIP.AUTO: NEGATIVE MG/DL
LEUKOCYTE ESTERASE UR QL STRIP.AUTO: NEGATIVE
LYMPHOCYTES # BLD: 1.4 K/UL (ref 1.1–5.9)
LYMPHOCYTES NFR BLD: 29 % (ref 14–44)
MAGNESIUM SERPL-MCNC: 2.1 MG/DL (ref 1.6–2.6)
MCH RBC QN AUTO: 33.7 PG (ref 25–35)
MCHC RBC AUTO-ENTMCNC: 33.8 G/DL (ref 31–37)
MCV RBC AUTO: 99.7 FL (ref 78–102)
NEUTS SEG # BLD: 2.8 K/UL (ref 1.8–9.5)
NEUTS SEG NFR BLD: 61 % (ref 40–70)
NITRITE UR QL STRIP.AUTO: NEGATIVE
PH UR STRIP: 7.5 [PH] (ref 5–8)
PLATELET # BLD AUTO: 68 K/UL (ref 140–440)
POTASSIUM SERPL-SCNC: 4.1 MMOL/L (ref 3.5–5.5)
PROT SERPL-MCNC: 6.6 G/DL (ref 6.4–8.2)
PROT UR STRIP-MCNC: NEGATIVE MG/DL
RBC # BLD AUTO: 3.8 M/UL (ref 4.1–5.1)
SODIUM SERPL-SCNC: 140 MMOL/L (ref 136–145)
SP GR UR REFRACTOMETRY: 1.02 (ref 1–1.03)
UROBILINOGEN UR QL STRIP.AUTO: 0.2 EU/DL (ref 0.2–1)
WBC # BLD AUTO: 4.6 K/UL (ref 4.5–13)

## 2021-06-15 PROCEDURE — 80053 COMPREHEN METABOLIC PANEL: CPT

## 2021-06-15 PROCEDURE — 74011250636 HC RX REV CODE- 250/636: Performed by: OBSTETRICS & GYNECOLOGY

## 2021-06-15 PROCEDURE — 83735 ASSAY OF MAGNESIUM: CPT

## 2021-06-15 PROCEDURE — 81003 URINALYSIS AUTO W/O SCOPE: CPT

## 2021-06-15 PROCEDURE — 99215 OFFICE O/P EST HI 40 MIN: CPT | Performed by: OBSTETRICS & GYNECOLOGY

## 2021-06-15 PROCEDURE — 86304 IMMUNOASSAY TUMOR CA 125: CPT

## 2021-06-15 PROCEDURE — 85025 COMPLETE CBC W/AUTO DIFF WBC: CPT

## 2021-06-15 PROCEDURE — 77030012965 HC NDL HUBR BBMI -A

## 2021-06-15 PROCEDURE — 36591 DRAW BLOOD OFF VENOUS DEVICE: CPT

## 2021-06-15 PROCEDURE — 87086 URINE CULTURE/COLONY COUNT: CPT

## 2021-06-15 RX ORDER — PREGABALIN 75 MG/1
75 CAPSULE ORAL 2 TIMES DAILY
Qty: 60 CAPSULE | Refills: 3 | Status: SHIPPED | OUTPATIENT
Start: 2021-06-15

## 2021-06-15 RX ORDER — SODIUM CHLORIDE 0.9 % (FLUSH) 0.9 %
5-10 SYRINGE (ML) INJECTION AS NEEDED
Status: DISCONTINUED | OUTPATIENT
Start: 2021-06-15 | End: 2021-06-17 | Stop reason: HOSPADM

## 2021-06-15 RX ORDER — HEPARIN 100 UNIT/ML
500 SYRINGE INTRAVENOUS ONCE
Status: COMPLETED | OUTPATIENT
Start: 2021-06-15 | End: 2021-06-15

## 2021-06-15 RX ADMIN — Medication 10 ML: at 09:14

## 2021-06-15 RX ADMIN — HEPARIN 500 UNITS: 100 SYRINGE at 09:14

## 2021-06-15 NOTE — PROGRESS NOTES
I called and spoke with patient regarding her platelet count of 68, and that Dr. Michelle Pérez ordered for it to be held for tomorrow and rescheduled for 1 week. . She asked that \"You have Dr. Michelle Pérez call me\". She does not want her treatment held as \"I have made arrangements for this treatment and have to get back to work\". I explained S/S of a low platelet count, and the risks involved. Discussed patient's concerns with Dr. Michelle Pérez and he will call and discuss with patient. Order signed to hold treatment. [facility] as well as pharmacy notified (spoke with Malcolm Gasca) regarding the rescheduling of C6. Scheduling also notified to move CT scan to July 8, 2021.

## 2021-06-15 NOTE — PROGRESS NOTES
[address], [address], [address] [facility]  [address] [facility], [address] [place] [address]  [place], [address]   [phone]  Kendrick Dexter DO        Patient ID:  Name: Shabbir Medrano  MRM: [id number]  : 1980/41 y.o. Date: 6/15/2021    SUBJECTIVE:    This is a 39 y.o.  female with h/o Stage IIIBG1 endometrial caner s/p Robotic-assisted total laparoscopic hysterectomy, bilateral salpingo-oophorectomy, upper vaginectomy; bilateral pelvic and para-aortic lymph node dissection; peritoneal biopsies and washings; cystoscopy. on 2021  S/p cycle #5  of Carbo/taxol on 2021 with dose reduction of taxol at cycle #3. Neuropathy worse. Having some hot flashes which is affecting sleep. Eating ok. Having lots a fatigue. Body aches controlled with percocet    Component      Latest Ref Rng & Units 2021           8:30 AM   CA-125      1.5 - 35.0 U/mL 14     Component      Latest Ref Rng & Units 2021           9:00 AM   CA-125      1.5 - 35.0 U/mL 15     Component      Latest Ref Rng & Units 3/30/2021           9:00 AM   CA-125      1.5 - 35.0 U/mL 19     Component      Latest Ref Rng & Units 3/9/2021           9:19 AM   CA-125      1.5 - 35.0 U/mL 30     Component      Latest Ref Rng & Units 2021          11:45 AM   CA-125      1.5 - 35.0 U/mL 31       Her pathology revealed      A: CERVIX, UTERUS, BILATERAL FALLOPIAN TUBES, AND UPPER VAGINA, TOTAL HYSTERECTOMY WITH BILATERAL SALPINGO-OOPHORECTOMY AND PARTIAL VAGINECTOMY:   ENDOMETRIAL ENDOMETRIOID ADENOCARCINOMA, FIGO GRADE 1, WITH EXTENSIVE INVASION INTO THE CERVIX AND UPPER VAGINA (PLEASE SEE COMMENTS AND SYNOPTIC REPORT). SURGICAL MARGINS NEGATIVE. BENIGN BILATERAL FALLOPIAN TUBES AND OVARIES. B: PELVIC ADHESIONS, BIOPSY:   BENIGN FIBROUS TISSUE. NEGATIVE FOR MALIGNANCY. C: RIGHT PELVIC SIDEWALL PERITONEUM, BIOPSY:   ADENOCARCINOMA.    D: RECTOSIGMOID EPIPLOICAE ADHESION, BIOPSY:   ADENOCARCINOMA. E: VAGINA, BIOPSY:   ACUTE INFLAMMATORY TISSUE. NO CARCINOMA SEEN. F: RIGHT PELVIC LYMPH NODE, DISSECTION:   THIRTEEN (13) BENIGN LYMPH NODES. G: LEFT PELVIC LYMPH NODE, DISSECTION:   EIGHT (8) BENIGN LYMPH NODES. H: RIGHT PARA-AORTIC LYMPH NODE, EXCISION:   ONE (1) BENIGN LYMPH NODE. I: LEFT PARA-AORTIC LYMPH NODE, EXCISION:   BENIGN FIBROADIPOSE TISSUE. NO LYMPHOID TISSUE SEEN. DIAGNOSIS COMMENT:   The intraoperative consultation (specimen A) is corroborated. Immunohistochemistry results for DNA mismatch repair proteins will be reported as an addendum. ENDOMETRIUM   SPECIMEN   Procedure: Total hysterectomy and bilateral salpingo-oophorectomy   TUMOR   Histologic Type: Endometrioid carcinoma, NOS   Histologic Grade: FIGO grade 1   Myometrial Invasion: Present   Depth of Myometrial Invasion (Millimeters): 6 mm   Myometrial Thickness (Millimeters): 28 mm   Percentage of Myometrial Invasion: 21 %   Uterine Serosa Involvement: Not identified   Lower Uterine Segment Involvement: Present, myoinvasive   Cervical Stromal Involvement: Present   Other Tissue / Organ Involvement: Upper Vagina, Pelvic wall, Rectosigmoid adhesion   Peritoneal Ascitic Fluid: Atypical: favor reactive   Lymphovascular Invasion: Not identified   MARGINS   Margins: Ectocervical / Vaginal Cuff Margin: Uninvolved by carcinoma   LYMPH NODES   Lymph Node Status: All lymph nodes negative for tumor cells   Total Number of Pelvic Nodes Examined: 21   Total Number of Para-aortic Nodes Examined: 1   PATHOLOGIC STAGE CLASSIFICATION (pTNM, AJCC 8th Edition)   Primary Tumor (pT): pT3b   Regional Lymph Nodes (pN): pN0   Distant Metastasis (pM): pM1   Specify Site(s): Right Pelvic sidewall peritoneum   GROSS DESCRIPTION:   The specimen is received in nine parts.    TEST(S) PERFORMED   Test(s) Performed: Mismatch Repair   Immunohistochemistry (IHC) Testing for Mismatch Repair (MMR) Proteins: MLH1, MSH2, MSH6, PMS2   MLH1: Intact nuclear expression   MSH2: Loss of nuclear expression   MSH6: Loss of nuclear expression   PMS2: Intact nuclear expression     Medications:     Current Outpatient Medications on File Prior to Visit   Medication Sig Dispense Refill    zolpidem (AMBIEN) 10 mg tablet Take 1 Tablet by mouth nightly as needed for Sleep for up to 30 days. Max Daily Amount: 10 mg. 30 Tablet 0    pregabalin (LYRICA) 50 mg capsule Take 1 Capsule by mouth two (2) times a day. Max Daily Amount: 100 mg. 60 Capsule 3    magic mouthwash solution Magic mouth wash   Maalox  Lidocaine 2% viscous   Diphenhydramine oral solution     Pharmacy to mix equal portions of ingredients to a total volume as indicated in the dispense amount. Indications: stomatitis, a condition with painful swelling and sores inside the mouth, Swish and spit 5-10 mL PRN every 6 hours for mouth sores. 250 mL 0    ondansetron hcl (ZOFRAN) 4 mg tablet Take 1 Tab by mouth every eight (8) hours as needed for Nausea or Vomiting. 30 Tab 2    LORazepam (Ativan) 0.5 mg tablet Take 1 Tab by mouth nightly as needed for Anxiety. Max Daily Amount: 0.5 mg. 30 Tab 0    ferrous sulfate 300 mg (60 mg iron)/5 mL syrup Take 5 mL by mouth daily for 180 days. 150 mL 5    dexAMETHasone (DECADRON) 4 mg tablet Take 40 mg (10 tablets) the night before Cycle #1 and Cycle #2 chemotherapy treatments. 20 Tab 0    promethazine (PHENERGAN) 25 mg tablet Take 1 Tab by mouth every six (6) hours as needed for Nausea. 30 Tab 2    lidocaine-prilocaine (EMLA) topical cream Apply  to affected area as needed for Pain. 30 g 0    megestroL (MEGACE) 400 mg/10 mL (10 mL) suspension Take 10 mL by mouth two (2) times a day. 1 Bottle 3    ondansetron hcl (Zofran) 4 mg tablet Take 1 Tab by mouth every six (6) hours. 30 Tab 0    omeprazole (PriLOSEC OTC) 20 mg tablet Take 20 mg by mouth daily.       mv-min/iron/folic/calcium/vitK (WOMEN'S MULTIVITAMIN PO) Take by mouth daily.  cyclobenzaprine (FLEXERIL) 10 mg tablet Take  by mouth three (3) times daily as needed for Muscle Spasm(s). No current facility-administered medications on file prior to visit. Allergies: Allergies   Allergen Reactions    Codeine Hives       OBJECTIVE:    Vitals:   Visit Vitals  LMP 01/09/2021 (Approximate)       Physical Examination:    General:  alert, cooperative, no distress   Abdomen: soft, bowel sounds active, non-tender   Incision: healed   Pelvic: Deferred    Rectal: not done   Extremity:   extremities normal, atraumatic, no cyanosis or edema     IMPRESSION/PLAN:    Stage IIIBG1 endometrial cancer  The operative procedures and clinical results have been reviewed with the patient. Previously Discussed recommendation for adjuvant chemotherapy  Plan for carbo auc=6, taxol 175 mg/m2 IV every 3 wks x 6 cycles. S/p cycle #5. Dose reduction to 135 mg/m2   Caris: BRCA2, MSI-H  Invitae: MSH2 pathogenic c/w park syndrome. Will refer to GI once complete  Decreased appetite-improved  Tolerating chemo well. Chemo induced anemia: Hgb: 11.4.  will monitor. Also taking Iron  Constipation- senokot, colace-improved  Body aches- percocet prn. Insomnia- explained she could take ambien 10 mg qhs  Neuropathy- given G3 neuropathy will dose reduce taxol to 135 mg/m2. increase lyrica to 75 mg bid  Thrombocytopenia- will plan to delay one week  Ok for cycle #6 pending platelet count  F/u after CT scan    Total time spent was 40 minutes regarding diagnosis of endometrial cancer and >50% of this time was spent counseling and coordinating care.   Faheem Martins DO  Gynecologic Oncology  6/15/2021/12:59 PM

## 2021-06-15 NOTE — PROGRESS NOTES
[facility] Progress Note    Date: Dedra 15, 2021    Name: Juan R Clifton    MRN: [id number]         : 1980    Pre-chemo labs    Ms. Aceves  was assessed and education was provided. Pt says she is having pain/numbness in her hand and feet which is relieved by lyrica. Pt denies any questions or concerns pertaining to tomorrows chemo. Ms. Enrique's vitals were reviewed and patient was observed for 5 minutes prior to treatment. Visit Vitals  /77 (BP 1 Location: Left arm, BP Patient Position: Sitting)   Pulse (!) 107   Temp 98.9 °F (37.2 °C)   Resp 16   Ht 5' 7\" (1.702 m)   Wt 76.2 kg (168 lb)   SpO2 98%   Breastfeeding No   BMI 26.31 kg/m²         LCWMP accessed per protocol with 20 g x 0.75\" , flushes easily with brisk blood return verified after laying patient in a suspine position. Labs obtained per policy. Flushed with 10 NS and 500 units heparin instilled     Bandaid applied. Lab results were obtained and reviewed. Recent Results (from the past 12 hour(s))   CBC WITH 3 PART DIFF    Collection Time: 06/15/21  9:00 AM   Result Value Ref Range    WBC 4.6 4.5 - 13.0 K/uL    RBC 3.80 (L) 4.10 - 5.10 M/uL    HGB 12.8 12.0 - 16.0 g/dL    HCT 37.9 36 - 48 %    MCV 99.7 78 - 102 FL    MCH 33.7 25.0 - 35.0 PG    MCHC 33.8 31 - 37 g/dL    RDW 14.2 11.5 - 14.5 %    PLATELET 68 (L) 247 - 440 K/uL    NEUTROPHILS 61 40 - 70 %    MIXED CELLS 10 0.1 - 17 %    LYMPHOCYTES 29 14 - 44 %    ABS. NEUTROPHILS 2.8 1.8 - 9.5 K/UL    ABS. MIXED CELLS 0.4 0.0 - 2.3 K/uL    ABS.  LYMPHOCYTES 1.4 1.1 - 5.9 K/UL    DF AUTOMATED     MAGNESIUM    Collection Time: 06/15/21  9:00 AM   Result Value Ref Range    Magnesium 2.1 1.6 - 2.6 mg/dL   METABOLIC PANEL, COMPREHENSIVE    Collection Time: 06/15/21  9:00 AM   Result Value Ref Range    Sodium 140 136 - 145 mmol/L    Potassium 4.1 3.5 - 5.5 mmol/L    Chloride 107 100 - 111 mmol/L    CO2 28 21 - 32 mmol/L    Anion gap 5 3.0 - 18 mmol/L    Glucose 92 74 - 99 mg/dL    BUN 13 7.0 - 18 MG/DL    Creatinine 0.79 0.6 - 1.3 MG/DL    BUN/Creatinine ratio 16 12 - 20      GFR est AA >60 >60 ml/min/1.73m2    GFR est non-AA >60 >60 ml/min/1.73m2    Calcium 9.1 8.5 - 10.1 MG/DL    Bilirubin, total 0.3 0.2 - 1.0 MG/DL    ALT (SGPT) 24 13 - 56 U/L    AST (SGOT) 14 10 - 38 U/L    Alk. phosphatase 76 45 - 117 U/L    Protein, total 6.6 6.4 - 8.2 g/dL    Albumin 3.8 3.4 - 5.0 g/dL    Globulin 2.8 2.0 - 4.0 g/dL    A-G Ratio 1.4 0.8 - 1.7     URINALYSIS W/ RFLX MICROSCOPIC    Collection Time: 06/15/21  9:00 AM   Result Value Ref Range    Color DARK YELLOW      Appearance CLEAR      Specific gravity 1.016 1.005 - 1.030      pH (UA) 7.5 5.0 - 8.0      Protein Negative NEG mg/dL    Glucose Negative NEG mg/dL    Ketone Negative NEG mg/dL    Bilirubin Negative NEG      Blood Negative NEG      Urobilinogen 0.2 0.2 - 1.0 EU/dL    Nitrites Negative NEG      Leukocyte Esterase Negative NEG       Platelets of 68 reported to MD Genoveva Phillips who orders to hold chemo for one week. See order. Ms. Yayo Gunderson tolerated the infusion, and had no complaints. Patient armband removed and shredded. Ms. Yayo Gunderson was discharged from [place] [address] in stable condition at [address] [place] [address].  She is to return on 6/22/2021 at 0800 for her next appointment    [facility], RN  Dedra 15, 2021

## 2021-06-15 NOTE — PROGRESS NOTES
Patient here ambulatory with her  for follow up. She is scheduled for C6 Taxol/Carbo tomorrow. She is having much more fatigue, and nausea with C5. After taking Zofran, she had to take phenergan with relief of nausea this last treatment. Suggested the sea-sick bracelets also. She starts her Magic Mouthwash with each treatment to prevent mouth sores since after her first treatment she had mouth sores. Appetite okay, and she is drinking 5-6 Pepsi's a day. Advised to also drink the 5-6 palacios a day. She said she would drink apple juice. She is having  \"aches all over\" and is hoping to have her Lyrica increased if possible. She has also taking oxycodone as a pain supplement. She has had constipation and takes senakot every day, advised to supplement with power pudding. Fingers are tingling, and feet numb, and knees are aching, \"all my bones ache\". Denies difficulty walking, \"But I am careful\". She is taking B6 B12 vitamins. Patient will follow up after scheduled Scan in 2 weeks. She would like to return to work by 7-20-21.

## 2021-06-16 ENCOUNTER — HOSPITAL ENCOUNTER (OUTPATIENT)
Dept: INFUSION THERAPY | Age: 41
End: 2021-06-16
Payer: COMMERCIAL

## 2021-06-16 DIAGNOSIS — C54.1 ENDOMETRIAL CANCER (HCC): ICD-10-CM

## 2021-06-16 LAB
BACTERIA SPEC CULT: NORMAL
SERVICE CMNT-IMP: NORMAL

## 2021-06-16 RX ORDER — EPINEPHRINE 1 MG/ML
0.3 INJECTION, SOLUTION, CONCENTRATE INTRAVENOUS AS NEEDED
Status: CANCELLED | OUTPATIENT
Start: 2021-06-22

## 2021-06-16 RX ORDER — ACETAMINOPHEN 325 MG/1
650 TABLET ORAL AS NEEDED
Status: CANCELLED
Start: 2021-06-22

## 2021-06-16 RX ORDER — DIPHENHYDRAMINE HYDROCHLORIDE 50 MG/ML
50 INJECTION, SOLUTION INTRAMUSCULAR; INTRAVENOUS AS NEEDED
Status: CANCELLED
Start: 2021-06-22

## 2021-06-16 RX ORDER — ONDANSETRON 2 MG/ML
8 INJECTION INTRAMUSCULAR; INTRAVENOUS AS NEEDED
Status: CANCELLED | OUTPATIENT
Start: 2021-06-22

## 2021-06-16 RX ORDER — PROCHLORPERAZINE EDISYLATE 5 MG/ML
10 INJECTION INTRAMUSCULAR; INTRAVENOUS
Status: CANCELLED
Start: 2021-06-22

## 2021-06-16 RX ORDER — HYDROCORTISONE SODIUM SUCCINATE 100 MG/2ML
100 INJECTION, POWDER, FOR SOLUTION INTRAMUSCULAR; INTRAVENOUS AS NEEDED
Status: CANCELLED | OUTPATIENT
Start: 2021-06-22

## 2021-06-16 RX ORDER — MAGNESIUM SULFATE HEPTAHYDRATE 40 MG/ML
2 INJECTION, SOLUTION INTRAVENOUS
Status: CANCELLED
Start: 2021-06-22

## 2021-06-16 RX ORDER — POTASSIUM CHLORIDE 7.45 MG/ML
10 INJECTION INTRAVENOUS
Status: CANCELLED
Start: 2021-06-22

## 2021-06-16 RX ORDER — DIPHENHYDRAMINE HYDROCHLORIDE 50 MG/ML
25 INJECTION, SOLUTION INTRAMUSCULAR; INTRAVENOUS AS NEEDED
Status: CANCELLED
Start: 2021-06-22

## 2021-06-16 RX ORDER — ALBUTEROL SULFATE 0.83 MG/ML
2.5 SOLUTION RESPIRATORY (INHALATION) AS NEEDED
Status: CANCELLED
Start: 2021-06-22

## 2021-06-17 LAB — CANCER AG125 SERPL-ACNC: 10 U/ML (ref 1.5–35)

## 2021-06-21 ENCOUNTER — HOSPITAL ENCOUNTER (OUTPATIENT)
Dept: INFUSION THERAPY | Age: 41
Discharge: HOME | End: 2021-06-21
Payer: COMMERCIAL

## 2021-06-21 VITALS
HEART RATE: 98 BPM | RESPIRATION RATE: 16 BRPM | OXYGEN SATURATION: 98 % | TEMPERATURE: 98.9 F | SYSTOLIC BLOOD PRESSURE: 111 MMHG | DIASTOLIC BLOOD PRESSURE: 77 MMHG

## 2021-06-21 LAB
ALBUMIN SERPL-MCNC: 3.8 G/DL (ref 3.4–5)
ALBUMIN/GLOB SERPL: 1.3 {RATIO} (ref 0.8–1.7)
ALP SERPL-CCNC: 79 U/L (ref 45–117)
ALT SERPL-CCNC: 31 U/L (ref 13–56)
ANION GAP SERPL CALC-SCNC: 5 MMOL/L (ref 3–18)
APPEARANCE UR: CLEAR
AST SERPL-CCNC: 22 U/L (ref 10–38)
BASO+EOS+MONOS # BLD AUTO: 0.2 K/UL (ref 0–2.3)
BASO+EOS+MONOS NFR BLD AUTO: 4 % (ref 0.1–17)
BILIRUB SERPL-MCNC: 0.4 MG/DL (ref 0.2–1)
BILIRUB UR QL: NEGATIVE
BUN SERPL-MCNC: 14 MG/DL (ref 7–18)
BUN/CREAT SERPL: 22 (ref 12–20)
CALCIUM SERPL-MCNC: 9.2 MG/DL (ref 8.5–10.1)
CHLORIDE SERPL-SCNC: 107 MMOL/L (ref 100–111)
CO2 SERPL-SCNC: 29 MMOL/L (ref 21–32)
COLOR UR: NORMAL
CREAT SERPL-MCNC: 0.65 MG/DL (ref 0.6–1.3)
DIFFERENTIAL METHOD BLD: ABNORMAL
ERYTHROCYTE [DISTWIDTH] IN BLOOD BY AUTOMATED COUNT: 13.7 % (ref 11.5–14.5)
GLOBULIN SER CALC-MCNC: 2.9 G/DL (ref 2–4)
GLUCOSE SERPL-MCNC: 96 MG/DL (ref 74–99)
GLUCOSE UR STRIP.AUTO-MCNC: NEGATIVE MG/DL
HCT VFR BLD AUTO: 37.4 % (ref 36–48)
HGB BLD-MCNC: 12.6 G/DL (ref 12–16)
HGB UR QL STRIP: NEGATIVE
KETONES UR QL STRIP.AUTO: NEGATIVE MG/DL
LEUKOCYTE ESTERASE UR QL STRIP.AUTO: NEGATIVE
LYMPHOCYTES # BLD: 1.1 K/UL (ref 1.1–5.9)
LYMPHOCYTES NFR BLD: 26 % (ref 14–44)
MAGNESIUM SERPL-MCNC: 2.1 MG/DL (ref 1.6–2.6)
MCH RBC QN AUTO: 34.1 PG (ref 25–35)
MCHC RBC AUTO-ENTMCNC: 33.7 G/DL (ref 31–37)
MCV RBC AUTO: 101.4 FL (ref 78–102)
NEUTS SEG # BLD: 2.9 K/UL (ref 1.8–9.5)
NEUTS SEG NFR BLD: 70 % (ref 40–70)
NITRITE UR QL STRIP.AUTO: NEGATIVE
PH UR STRIP: 7 [PH] (ref 5–8)
PLATELET # BLD AUTO: 152 K/UL (ref 140–440)
POTASSIUM SERPL-SCNC: 4 MMOL/L (ref 3.5–5.5)
PROT SERPL-MCNC: 6.7 G/DL (ref 6.4–8.2)
PROT UR STRIP-MCNC: NEGATIVE MG/DL
RBC # BLD AUTO: 3.69 M/UL (ref 4.1–5.1)
SODIUM SERPL-SCNC: 141 MMOL/L (ref 136–145)
SP GR UR REFRACTOMETRY: 1.02 (ref 1–1.03)
UROBILINOGEN UR QL STRIP.AUTO: 0.2 EU/DL (ref 0.2–1)
WBC # BLD AUTO: 4.2 K/UL (ref 4.5–13)

## 2021-06-21 PROCEDURE — 77030012965 HC NDL HUBR BBMI -A

## 2021-06-21 PROCEDURE — 83735 ASSAY OF MAGNESIUM: CPT

## 2021-06-21 PROCEDURE — 80053 COMPREHEN METABOLIC PANEL: CPT

## 2021-06-21 PROCEDURE — 74011250636 HC RX REV CODE- 250/636: Performed by: OBSTETRICS & GYNECOLOGY

## 2021-06-21 PROCEDURE — 36591 DRAW BLOOD OFF VENOUS DEVICE: CPT

## 2021-06-21 PROCEDURE — 87086 URINE CULTURE/COLONY COUNT: CPT

## 2021-06-21 PROCEDURE — 85025 COMPLETE CBC W/AUTO DIFF WBC: CPT

## 2021-06-21 PROCEDURE — 81003 URINALYSIS AUTO W/O SCOPE: CPT

## 2021-06-21 RX ORDER — SODIUM CHLORIDE 0.9 % (FLUSH) 0.9 %
5-10 SYRINGE (ML) INJECTION AS NEEDED
Status: DISCONTINUED | OUTPATIENT
Start: 2021-06-21 | End: 2021-06-23 | Stop reason: HOSPADM

## 2021-06-21 RX ORDER — HEPARIN 100 UNIT/ML
500 SYRINGE INTRAVENOUS ONCE
Status: COMPLETED | OUTPATIENT
Start: 2021-06-21 | End: 2021-06-21

## 2021-06-21 RX ADMIN — Medication 10 ML: at 09:05

## 2021-06-21 RX ADMIN — HEPARIN 500 UNITS: 100 SYRINGE at 09:05

## 2021-06-21 NOTE — PROGRESS NOTES
[facility] Progress Note    Date: 2021    Name: Marisa Parra    MRN: [id number]         : 1980    Pre-chemo labs    Ms. PATE [facility] was assessed and education was provided. Pt reports chronic fatigue. Pt denies any questions or concerns pertaining to treatment. Ms. Enrique's vitals were reviewed and patient was observed for 5 minutes prior to treatment. Visit Vitals  /77 (BP 1 Location: Left arm, BP Patient Position: Sitting)   Pulse 98   Temp 98.9 °F (37.2 °C)   Resp 16   SpO2 98%         LCWMP accessed per protocol with 20 g x 0.75\" , flushes easily with brisk blood return verified after laying patient in a suspine position. Labs obtained per policy. Flushed with 10 NS and 500 units heparin instilled     Bandaid applied. Lab results were obtained and reviewed. Recent Results (from the past 12 hour(s))   CBC WITH 3 PART DIFF    Collection Time: 21  9:00 AM   Result Value Ref Range    WBC 4.2 (L) 4.5 - 13.0 K/uL    RBC 3.69 (L) 4.10 - 5.10 M/uL    HGB 12.6 12.0 - 16.0 g/dL    HCT 37.4 36 - 48 %    .4 78 - 102 FL    MCH 34.1 25.0 - 35.0 PG    MCHC 33.7 31 - 37 g/dL    RDW 13.7 11.5 - 14.5 %    PLATELET 378 865 - 000 K/uL    NEUTROPHILS 70 40 - 70 %    MIXED CELLS 4 0.1 - 17 %    LYMPHOCYTES 26 14 - 44 %    ABS. NEUTROPHILS 2.9 1.8 - 9.5 K/UL    ABS. MIXED CELLS 0.2 0.0 - 2.3 K/uL    ABS. LYMPHOCYTES 1.1 1.1 - 5.9 K/UL    DF AUTOMATED     URINALYSIS W/ RFLX MICROSCOPIC    Collection Time: 21  9:12 AM   Result Value Ref Range    Color DARK YELLOW      Appearance CLEAR      Specific gravity 1.017 1.005 - 1.030      pH (UA) 7.0 5.0 - 8.0      Protein Negative NEG mg/dL    Glucose Negative NEG mg/dL    Ketone Negative NEG mg/dL    Bilirubin Negative NEG      Blood Negative NEG      Urobilinogen 0.2 0.2 - 1.0 EU/dL    Nitrites Negative NEG      Leukocyte Esterase Negative NEG         [facility] tolerated the infusion, and had no complaints.   Patient armband removed and shredded. Ms. Ebony Hollingsworth was discharged from [place] [address] in stable condition at [address] [place] [address].  She is to return on 6/23/2021 at 0800 for her next appointment    [facility], RN  June 21, 2021

## 2021-06-22 ENCOUNTER — PATIENT MESSAGE (OUTPATIENT)
Dept: ONCOLOGY | Age: 41
End: 2021-06-22

## 2021-06-22 ENCOUNTER — HOSPITAL ENCOUNTER (OUTPATIENT)
Dept: INFUSION THERAPY | Age: 41
Discharge: HOME | End: 2021-06-22
Payer: COMMERCIAL

## 2021-06-22 VITALS
BODY MASS INDEX: 26.21 KG/M2 | WEIGHT: 167 LBS | OXYGEN SATURATION: 98 % | SYSTOLIC BLOOD PRESSURE: 101 MMHG | HEART RATE: 98 BPM | TEMPERATURE: 97.8 F | HEIGHT: 67 IN | RESPIRATION RATE: 16 BRPM | DIASTOLIC BLOOD PRESSURE: 69 MMHG

## 2021-06-22 DIAGNOSIS — F51.01 PRIMARY INSOMNIA: ICD-10-CM

## 2021-06-22 DIAGNOSIS — G89.3 CANCER ASSOCIATED PAIN: Primary | ICD-10-CM

## 2021-06-22 DIAGNOSIS — C54.1 ENDOMETRIAL CANCER (HCC): Primary | ICD-10-CM

## 2021-06-22 LAB
BACTERIA SPEC CULT: NORMAL
SERVICE CMNT-IMP: NORMAL

## 2021-06-22 PROCEDURE — 74011000250 HC RX REV CODE- 250: Performed by: OBSTETRICS & GYNECOLOGY

## 2021-06-22 PROCEDURE — 96417 CHEMO IV INFUS EACH ADDL SEQ: CPT

## 2021-06-22 PROCEDURE — 96367 TX/PROPH/DG ADDL SEQ IV INF: CPT

## 2021-06-22 PROCEDURE — 74011250636 HC RX REV CODE- 250/636: Performed by: OBSTETRICS & GYNECOLOGY

## 2021-06-22 PROCEDURE — 77030012965 HC NDL HUBR BBMI -A

## 2021-06-22 PROCEDURE — 96375 TX/PRO/DX INJ NEW DRUG ADDON: CPT

## 2021-06-22 PROCEDURE — 74011000258 HC RX REV CODE- 258: Performed by: OBSTETRICS & GYNECOLOGY

## 2021-06-22 PROCEDURE — 96415 CHEMO IV INFUSION ADDL HR: CPT

## 2021-06-22 PROCEDURE — 96413 CHEMO IV INFUSION 1 HR: CPT

## 2021-06-22 RX ORDER — SODIUM CHLORIDE 0.9 % (FLUSH) 0.9 %
10 SYRINGE (ML) INJECTION AS NEEDED
Status: DISPENSED | OUTPATIENT
Start: 2021-06-22 | End: 2021-06-22

## 2021-06-22 RX ORDER — DIPHENHYDRAMINE HYDROCHLORIDE 50 MG/ML
50 INJECTION, SOLUTION INTRAMUSCULAR; INTRAVENOUS ONCE
Status: COMPLETED | OUTPATIENT
Start: 2021-06-22 | End: 2021-06-22

## 2021-06-22 RX ORDER — SODIUM CHLORIDE 9 MG/ML
10 INJECTION INTRAMUSCULAR; INTRAVENOUS; SUBCUTANEOUS AS NEEDED
Status: ACTIVE | OUTPATIENT
Start: 2021-06-22 | End: 2021-06-22

## 2021-06-22 RX ORDER — HEPARIN 100 UNIT/ML
300-500 SYRINGE INTRAVENOUS AS NEEDED
Status: ACTIVE | OUTPATIENT
Start: 2021-06-22 | End: 2021-06-22

## 2021-06-22 RX ORDER — LORAZEPAM 2 MG/ML
0.26 INJECTION INTRAMUSCULAR ONCE
Status: COMPLETED | OUTPATIENT
Start: 2021-06-22 | End: 2021-06-22

## 2021-06-22 RX ORDER — SODIUM CHLORIDE 9 MG/ML
25 INJECTION, SOLUTION INTRAVENOUS CONTINUOUS
Status: DISPENSED | OUTPATIENT
Start: 2021-06-22 | End: 2021-06-22

## 2021-06-22 RX ADMIN — DEXAMETHASONE SODIUM PHOSPHATE: 10 INJECTION INTRAMUSCULAR; INTRAVENOUS at 09:02

## 2021-06-22 RX ADMIN — SODIUM CHLORIDE 500 ML: 0.9 INJECTION, SOLUTION INTRAVENOUS at 08:20

## 2021-06-22 RX ADMIN — CARBOPLATIN 700 MG: 10 INJECTION, SOLUTION INTRAVENOUS at 13:20

## 2021-06-22 RX ADMIN — Medication 10 ML: at 14:31

## 2021-06-22 RX ADMIN — FOSAPREPITANT 150 MG: 150 INJECTION, POWDER, LYOPHILIZED, FOR SOLUTION INTRAVENOUS at 08:35

## 2021-06-22 RX ADMIN — FAMOTIDINE 20 MG: 10 INJECTION INTRAVENOUS at 09:42

## 2021-06-22 RX ADMIN — HEPARIN 500 UNITS: 100 SYRINGE at 14:33

## 2021-06-22 RX ADMIN — SODIUM CHLORIDE 25 ML/HR: 0.9 INJECTION, SOLUTION INTRAVENOUS at 08:20

## 2021-06-22 RX ADMIN — PACLITAXEL 222 MG: 6 INJECTION, SOLUTION INTRAVENOUS at 09:56

## 2021-06-22 RX ADMIN — DIPHENHYDRAMINE HYDROCHLORIDE 50 MG: 50 INJECTION INTRAMUSCULAR; INTRAVENOUS at 09:45

## 2021-06-22 RX ADMIN — LORAZEPAM 0.26 MG: 2 INJECTION INTRAMUSCULAR; INTRAVENOUS at 09:48

## 2021-06-22 NOTE — PROGRESS NOTES
[facility] Progress Note    Date: 2021    Name: Frances Marie              MRN: [id number]              : 1980    Chemotherapy Cycle: C6D1  Taxol/Carbo       Ms. Janine Caraballo was assessed and education was provided. Ms. Enrique's vitals were reviewed. Visit Vitals  /69 (BP 1 Location: Left arm, BP Patient Position: Sitting)   Pulse 98   Temp 97.8 °F (36.6 °C)   Resp 16   Ht 5' 7\" (1.702 m)   Wt 75.8 kg (167 lb)   SpO2 98%   BMI 26.16 kg/m²     LCWMP accessed per protocol, flushes easily with brisk blood return. ANC= 2.9 JVK=[id number] reviewed from 21 labs. [id number] Sheryl Stone for treatment today.  ml bolus infusing per order.     Pre-medications were administered as follows:     IV Emend 150mg   IV Decadron 12mg/Zofran 16mg   IV Pepcid 20mg  IV Benadryl 50mg  IV Ativan 0.26 mg       Paclitaxel 222 mg (dose reduced due to neuropathy) was infused over 3 hours per order.     VS remain stable, pt denies any s/s of an allergic reaction, denies CP, SOB, itching, hives, facial swelling or flushing.     Carboplatin 700mg infused over 1 hour per order.     VS remain stable, pt denies any s/s of an allergic reaction, denies CP, SOB, itching, hives, facial swelling or flushing.      Port flushed with NS and heparin per protocol and de-accessed. Band aid declined by patient.     Ms. Enrique tolerated infusion, and had no complaints at this time. Patient armband removed and shredded. Ms. Janine Caraballo was discharged from [place] [address] in stable condition at . She is to f/u with Dr. Nereyda Thomas as instructed  for her next appointment.     Rochelle Degroot RN  2021  12:04 PM

## 2021-06-23 RX ORDER — ZOLPIDEM TARTRATE 10 MG/1
10 TABLET ORAL
Qty: 30 TABLET | Refills: 0 | Status: SHIPPED | OUTPATIENT
Start: 2021-06-23 | End: 2021-07-24 | Stop reason: SDUPTHER

## 2021-06-23 RX ORDER — ONDANSETRON 4 MG/1
4 TABLET, FILM COATED ORAL
Qty: 30 TABLET | Refills: 2 | Status: SHIPPED | OUTPATIENT
Start: 2021-06-23

## 2021-06-23 RX ORDER — OXYCODONE AND ACETAMINOPHEN 5; 325 MG/1; MG/1
1 TABLET ORAL
Qty: 30 TABLET | Refills: 0 | Status: SHIPPED | OUTPATIENT
Start: 2021-06-23 | End: 2021-07-16 | Stop reason: SDUPTHER

## 2021-06-29 DIAGNOSIS — C54.1 ENDOMETRIAL CANCER (HCC): ICD-10-CM

## 2021-07-08 ENCOUNTER — HOSPITAL ENCOUNTER (OUTPATIENT)
Dept: CT IMAGING | Age: 41
Discharge: HOME | End: 2021-07-08
Attending: OBSTETRICS & GYNECOLOGY
Payer: COMMERCIAL

## 2021-07-08 PROCEDURE — 74177 CT ABD & PELVIS W/CONTRAST: CPT

## 2021-07-08 PROCEDURE — 74011000250 HC RX REV CODE- 250: Performed by: OBSTETRICS & GYNECOLOGY

## 2021-07-08 PROCEDURE — 74011000636 HC RX REV CODE- 636: Performed by: OBSTETRICS & GYNECOLOGY

## 2021-07-08 RX ORDER — HEPARIN SODIUM (PORCINE) LOCK FLUSH IV SOLN 100 UNIT/ML 100 UNIT/ML
SOLUTION INTRAVENOUS
Status: DISPENSED
Start: 2021-07-08 | End: 2021-07-08

## 2021-07-08 RX ORDER — BARIUM SULFATE 20 MG/ML
900 SUSPENSION ORAL
Status: COMPLETED | OUTPATIENT
Start: 2021-07-08 | End: 2021-07-08

## 2021-07-08 RX ADMIN — IOPAMIDOL 100 ML: 612 INJECTION, SOLUTION INTRAVENOUS at 09:43

## 2021-07-08 RX ADMIN — BARIUM SULFATE 900 ML: 20 SUSPENSION ORAL at 09:43

## 2021-07-08 NOTE — PROGRESS NOTES
Received request from [employer], to access mediport for contrast injection. Port assessed to be contrast-capable as evidenced by three bumps noted via palpation and observation. Pt confirms she is only allergic to codeine, and does not have any tape or CHG sensitivity. Mediport accessed under sterile technique with positive aspiration and flush of saline x 20 mls. Patient tolerated well. Post CT scan, mediport was flushed with 10 mls of saline and 500 units in 5 mls of heparin flush. Mediport was de-accessed and a bandaid dressing was applied. Patient tolerated well. No signs of acute distress noted at any point by this RN.  Flora Martins RN

## 2021-07-09 ENCOUNTER — DOCUMENTATION ONLY (OUTPATIENT)
Dept: ONCOLOGY | Age: 41
End: 2021-07-09

## 2021-07-09 NOTE — PROGRESS NOTES
Called and reviewed CT with small lung nodule  Likely not related to her cancer but will get repeat CT scan in 3 months  Pt agreeable

## 2021-07-13 ENCOUNTER — OFFICE VISIT (OUTPATIENT)
Dept: ONCOLOGY | Age: 41
End: 2021-07-13
Payer: COMMERCIAL

## 2021-07-13 VITALS
DIASTOLIC BLOOD PRESSURE: 76 MMHG | BODY MASS INDEX: 28.09 KG/M2 | HEART RATE: 116 BPM | OXYGEN SATURATION: 97 % | WEIGHT: 179 LBS | HEIGHT: 67 IN | SYSTOLIC BLOOD PRESSURE: 130 MMHG | TEMPERATURE: 97.9 F

## 2021-07-13 DIAGNOSIS — C54.1 ENDOMETRIAL CANCER (HCC): Primary | ICD-10-CM

## 2021-07-13 DIAGNOSIS — R10.2 PELVIC PAIN: ICD-10-CM

## 2021-07-13 PROCEDURE — 99215 OFFICE O/P EST HI 40 MIN: CPT | Performed by: OBSTETRICS & GYNECOLOGY

## 2021-07-13 RX ORDER — LORAZEPAM 0.5 MG/1
0.5 TABLET ORAL
Qty: 30 TABLET | Refills: 0 | Status: SHIPPED | OUTPATIENT
Start: 2021-07-13 | End: 2021-08-04 | Stop reason: SDUPTHER

## 2021-07-13 NOTE — PROGRESS NOTES
Caterina Vicente is a 39 y.o. female presents in office for results and chemo f/u    Chief Complaint   Patient presents with    Follow-up        Pt reports body aches. Pt denies any bleeding, changes in bm and voiding. Visit Vitals  /76 (BP 1 Location: Right arm, BP Patient Position: Sitting)   Pulse (!) 116   Temp 97.9 °F (36.6 °C) (Oral)   Ht 5' 7\" (1.702 m)   Wt 179 lb (81.2 kg)   SpO2 97%   BMI 28.04 kg/m²         1. Have you been to the ER, urgent care clinic since your last visit? Hospitalized since your last visit? No    2. Have you seen or consulted any other health care providers outside of the [address] [place] [address] since your last visit? Include any pap smears or colon screening. No    3 most recent PHQ Screens 7/13/2021   Little interest or pleasure in doing things Not at all   Feeling down, depressed, irritable, or hopeless Not at all   Total Score PHQ 2 0       No flowsheet data found. No flowsheet data found.     Learning Assessment 3/9/2021   PRIMARY LEARNER Patient   BARRIERS PRIMARY LEARNER NONE   CO-LEARNER CAREGIVER No   PRIMARY LANGUAGE ENGLISH   LEARNER PREFERENCE PRIMARY DEMONSTRATION   ANSWERED BY Patient   RELATIONSHIP SELF

## 2021-07-13 NOTE — PROGRESS NOTES
[facility]  [address] [place] [address], [address], [address]  [address] [facility], [address] [place] [address]  [place], [address]   [phone]  Uday Goldberg DO        Patient ID:  Name: Chaitanya Stratton  MRM: [id number]  : 1980/41 y.o. Date: 2021    SUBJECTIVE:    This is a 39 y.o.  female with h/o Stage IIIBG1 endometrial caner s/p Robotic-assisted total laparoscopic hysterectomy, bilateral salpingo-oophorectomy, upper vaginectomy; bilateral pelvic and para-aortic lymph node dissection; peritoneal biopsies and washings; cystoscopy. on 2021  S/p cycle #6   of Carbo/taxol on 2021 with dose reduction of taxol at cycle #3. Neuropathy slightly  worse. Eating ok. Having extreme fatigue. Body aches controlled with percocet. Not sleeping as well. Has been having more frequent headaches  Here to review CT scan        Imaging  2021  FINDINGS:     CHEST:     LUNGS: 2 mm posterior left lower lobe lung nodule (thin axial image 129). No  abnormal opacities.     PLEURA: Normal, with no effusion or pneumothorax.     AIRWAY: Normal.     MEDIASTINUM: Normal heart size. No pericardial effusion. Given the limitations  of cardiac motion, great vessels are unremarkable.     LYMPH NODES: No enlarged lymph nodes.     ===============     ABDOMEN/PELVIS:     LIVER, BILIARY: Liver is normal. No biliary dilation. Gallbladder is  unremarkable.     PANCREAS: Normal.     SPLEEN: Normal.     ADRENALS: Normal.     KIDNEYS/URETERS/BLADDER: Normal.     LYMPH NODES: No enlarged lymph nodes.     GASTROINTESTINAL TRACT: No bowel dilation or wall thickening. Unremarkable  appendix.     PELVIC ORGANS: Hysterectomy.     VASCULATURE: Unremarkable.     BONES: No acute or aggressive osseous abnormalities identified.     OTHER: No ascites.  Small fat-containing umbilical hernia.     _______________     IMPRESSION     1. Nonspecific left lower lobe lung nodule (2 mm), not imaged on prior abdominal  CT. Follow-up per oncologic protocol.     2. Otherwise, no evidence for recurrent or metastatic disease within the chest,  abdomen or pelvis. Component      Latest Ref Rng & Units 6/15/2021           9:00 AM   CA-125      1.5 - 35.0 U/mL 10     Component      Latest Ref Rng & Units 5/11/2021           8:30 AM   CA-125      1.5 - 35.0 U/mL 14     Component      Latest Ref Rng & Units 4/19/2021           9:00 AM   CA-125      1.5 - 35.0 U/mL 15     Component      Latest Ref Rng & Units 3/30/2021           9:00 AM   CA-125      1.5 - 35.0 U/mL 19     Component      Latest Ref Rng & Units 3/9/2021           9:19 AM   CA-125      1.5 - 35.0 U/mL 30     Component      Latest Ref Rng & Units 2/16/2021          11:45 AM   CA-125      1.5 - 35.0 U/mL 31       Her pathology revealed      A: CERVIX, UTERUS, BILATERAL FALLOPIAN TUBES, AND UPPER VAGINA, TOTAL HYSTERECTOMY WITH BILATERAL SALPINGO-OOPHORECTOMY AND PARTIAL VAGINECTOMY:   ENDOMETRIAL ENDOMETRIOID ADENOCARCINOMA, FIGO GRADE 1, WITH EXTENSIVE INVASION INTO THE CERVIX AND UPPER VAGINA (PLEASE SEE COMMENTS AND SYNOPTIC REPORT). SURGICAL MARGINS NEGATIVE. BENIGN BILATERAL FALLOPIAN TUBES AND OVARIES. B: PELVIC ADHESIONS, BIOPSY:   BENIGN FIBROUS TISSUE. NEGATIVE FOR MALIGNANCY. C: RIGHT PELVIC SIDEWALL PERITONEUM, BIOPSY:   ADENOCARCINOMA. D: RECTOSIGMOID EPIPLOICAE ADHESION, BIOPSY:   ADENOCARCINOMA. E: VAGINA, BIOPSY:   ACUTE INFLAMMATORY TISSUE. NO CARCINOMA SEEN. F: RIGHT PELVIC LYMPH NODE, DISSECTION:   THIRTEEN (13) BENIGN LYMPH NODES. G: LEFT PELVIC LYMPH NODE, DISSECTION:   EIGHT (8) BENIGN LYMPH NODES. H: RIGHT PARA-AORTIC LYMPH NODE, EXCISION:   ONE (1) BENIGN LYMPH NODE. I: LEFT PARA-AORTIC LYMPH NODE, EXCISION:   BENIGN FIBROADIPOSE TISSUE. NO LYMPHOID TISSUE SEEN. DIAGNOSIS COMMENT:   The intraoperative consultation (specimen A) is corroborated.    Immunohistochemistry results for DNA mismatch repair proteins will be reported as an addendum. ENDOMETRIUM   SPECIMEN   Procedure: Total hysterectomy and bilateral salpingo-oophorectomy   TUMOR   Histologic Type: Endometrioid carcinoma, NOS   Histologic Grade: FIGO grade 1   Myometrial Invasion: Present   Depth of Myometrial Invasion (Millimeters): 6 mm   Myometrial Thickness (Millimeters): 28 mm   Percentage of Myometrial Invasion: 21 %   Uterine Serosa Involvement: Not identified   Lower Uterine Segment Involvement: Present, myoinvasive   Cervical Stromal Involvement: Present   Other Tissue / Organ Involvement: Upper Vagina, Pelvic wall, Rectosigmoid adhesion   Peritoneal Ascitic Fluid: Atypical: favor reactive   Lymphovascular Invasion: Not identified   MARGINS   Margins: Ectocervical / Vaginal Cuff Margin: Uninvolved by carcinoma   LYMPH NODES   Lymph Node Status: All lymph nodes negative for tumor cells   Total Number of Pelvic Nodes Examined: 21   Total Number of Para-aortic Nodes Examined: 1   PATHOLOGIC STAGE CLASSIFICATION (pTNM, AJCC 8th Edition)   Primary Tumor (pT): pT3b   Regional Lymph Nodes (pN): pN0   Distant Metastasis (pM): pM1   Specify Site(s): Right Pelvic sidewall peritoneum   GROSS DESCRIPTION:   The specimen is received in nine parts. TEST(S) PERFORMED   Test(s) Performed: Mismatch Repair   Immunohistochemistry (IHC) Testing for Mismatch Repair (MMR)   Proteins: MLH1, MSH2, MSH6, PMS2   MLH1: Intact nuclear expression   MSH2: Loss of nuclear expression   MSH6: Loss of nuclear expression   PMS2: Intact nuclear expression     Medications:     Current Outpatient Medications on File Prior to Visit   Medication Sig Dispense Refill    ondansetron hcl (ZOFRAN) 4 mg tablet Take 1 Tablet by mouth every eight (8) hours as needed for Nausea or Vomiting. 30 Tablet 2    zolpidem (AMBIEN) 10 mg tablet Take 1 Tablet by mouth nightly as needed for Sleep for up to 30 days.  Max Daily Amount: 10 mg. 30 Tablet 0    pregabalin (LYRICA) 75 mg capsule Take 1 Capsule by mouth two (2) times a day. Max Daily Amount: 150 mg. 60 Capsule 3    magic mouthwash solution Magic mouth wash   Maalox  Lidocaine 2% viscous   Diphenhydramine oral solution     Pharmacy to mix equal portions of ingredients to a total volume as indicated in the dispense amount. Indications: stomatitis, a condition with painful swelling and sores inside the mouth, Swish and spit 5-10 mL PRN every 6 hours for mouth sores. 250 mL 0    LORazepam (Ativan) 0.5 mg tablet Take 1 Tab by mouth nightly as needed for Anxiety. Max Daily Amount: 0.5 mg. 30 Tab 0    ferrous sulfate 300 mg (60 mg iron)/5 mL syrup Take 5 mL by mouth daily for 180 days. 150 mL 5    dexAMETHasone (DECADRON) 4 mg tablet Take 40 mg (10 tablets) the night before Cycle #1 and Cycle #2 chemotherapy treatments. 20 Tab 0    promethazine (PHENERGAN) 25 mg tablet Take 1 Tab by mouth every six (6) hours as needed for Nausea. 30 Tab 2    lidocaine-prilocaine (EMLA) topical cream Apply  to affected area as needed for Pain. 30 g 0    megestroL (MEGACE) 400 mg/10 mL (10 mL) suspension Take 10 mL by mouth two (2) times a day. 1 Bottle 3    ondansetron hcl (Zofran) 4 mg tablet Take 1 Tab by mouth every six (6) hours. 30 Tab 0    omeprazole (PriLOSEC OTC) 20 mg tablet Take 20 mg by mouth daily.  mv-min/iron/folic/calcium/vitK (WOMEN'S MULTIVITAMIN PO) Take  by mouth daily.  cyclobenzaprine (FLEXERIL) 10 mg tablet Take  by mouth three (3) times daily as needed for Muscle Spasm(s). No current facility-administered medications on file prior to visit. Allergies:      Allergies   Allergen Reactions    Codeine Hives       OBJECTIVE:    Vitals:   Visit Vitals  LMP 01/09/2021 (Approximate)       Physical Examination:    General:  alert, cooperative, no distress   Abdomen: soft, bowel sounds active, non-tender   Incision: healed   Pelvic: Deferred    Rectal: not done   Extremity:   extremities normal, atraumatic, no cyanosis or edema     IMPRESSION/PLAN:    Stage IIIBG1 endometrial cancer  The operative procedures and clinical results have been reviewed with the patient. Previously Discussed recommendation for adjuvant chemotherapy  \s/p carbo auc=6, taxol 175 mg/m2 IV every 3 wks x 6 cycles. Caris: BRCA2, MSI-H  Invitae: MSH2 pathogenic c/w park syndrome. Will refer to GI once complete  Decreased appetite-improved  Chemo induced anemia: resolved  Constipation- senokot, colace-improved  Body aches- percocet prn. Insomnia- explained she could take ambien 10 mg qhs with ativan prn  Headache: monitor. If persists encouraged her to call  Extreme fatigue- will keep out of work until october  Neuropathy- given G3 neuropathy will dose reduce taxol to 135 mg/m2. increase lyrica to 75 mg bid  Reviewed CT with on ly finding of 3 mm lung nodule. Will plan to repeat in 3 months  Discussed surveillance strategy and signs/symptoms of recurrence  Plan for f/u in 3 months      Total time spent was 40 minutes regarding diagnosis of endometrial cancer and >50% of this time was spent counseling and coordinating care.   Marixa Bernal DO  Gynecologic Oncology  7/13/2021/12:59 PM

## 2021-07-13 NOTE — LETTER
NOTIFICATION RETURN TO WORK     7/13/2021 11:46 AM    Ms. Chaitanya Stratton  [address] [address] [address]      To Whom It May Concern:    Chaitanya Stratton is currently under the care of Goldy Funk. She will return to work on: 10/20/2021    If there are questions or concerns please have the patient contact our office.         Sincerely,            Case Saul MD

## 2021-07-16 DIAGNOSIS — G89.3 CANCER ASSOCIATED PAIN: ICD-10-CM

## 2021-07-16 RX ORDER — OXYCODONE AND ACETAMINOPHEN 5; 325 MG/1; MG/1
1 TABLET ORAL
Qty: 30 TABLET | Refills: 0 | Status: SHIPPED | OUTPATIENT
Start: 2021-07-16 | End: 2021-08-04 | Stop reason: SDUPTHER

## 2021-07-24 DIAGNOSIS — F51.01 PRIMARY INSOMNIA: ICD-10-CM

## 2021-07-26 ENCOUNTER — PATIENT MESSAGE (OUTPATIENT)
Dept: ONCOLOGY | Age: 41
End: 2021-07-26

## 2021-07-26 RX ORDER — ZOLPIDEM TARTRATE 10 MG/1
10 TABLET ORAL
Qty: 30 TABLET | Refills: 0 | Status: SHIPPED | OUTPATIENT
Start: 2021-07-26 | End: 2021-08-24 | Stop reason: SDUPTHER

## 2021-07-28 NOTE — TELEPHONE ENCOUNTER
Spoke with patient and relayed that requested form was completed and scanned into her chart. She stated she had already seen the form and submitted it. Updated patient that at this time [facility] is not enrolling it's providers for medical marijuana prescriptions. Patient stated that she had found an article yesterday with that information. Clarified lab draw/ schedule to start on 8/3 and be drawn every other port flush. All patient's questions answered.

## 2021-08-03 ENCOUNTER — HOSPITAL ENCOUNTER (OUTPATIENT)
Dept: INFUSION THERAPY | Age: 41
Discharge: HOME | End: 2021-08-03
Payer: COMMERCIAL

## 2021-08-03 VITALS
RESPIRATION RATE: 16 BRPM | HEART RATE: 72 BPM | TEMPERATURE: 98.8 F | OXYGEN SATURATION: 100 % | DIASTOLIC BLOOD PRESSURE: 65 MMHG | SYSTOLIC BLOOD PRESSURE: 117 MMHG

## 2021-08-03 PROCEDURE — 74011250636 HC RX REV CODE- 250/636: Performed by: OBSTETRICS & GYNECOLOGY

## 2021-08-03 PROCEDURE — 86304 IMMUNOASSAY TUMOR CA 125: CPT

## 2021-08-03 PROCEDURE — 77030012965 HC NDL HUBR BBMI -A

## 2021-08-03 PROCEDURE — 36591 DRAW BLOOD OFF VENOUS DEVICE: CPT

## 2021-08-03 RX ORDER — SODIUM CHLORIDE 0.9 % (FLUSH) 0.9 %
5-10 SYRINGE (ML) INJECTION AS NEEDED
Status: DISCONTINUED | OUTPATIENT
Start: 2021-08-03 | End: 2021-08-05 | Stop reason: HOSPADM

## 2021-08-03 RX ORDER — HEPARIN 100 UNIT/ML
500 SYRINGE INTRAVENOUS ONCE
Status: COMPLETED | OUTPATIENT
Start: 2021-08-03 | End: 2021-08-03

## 2021-08-03 RX ADMIN — HEPARIN 500 UNITS: 100 SYRINGE at 09:06

## 2021-08-03 RX ADMIN — Medication 10 ML: at 09:06

## 2021-08-03 NOTE — PROGRESS NOTES
[facility] Progress Note    Date: August 3, 2021    Name: Chelsea Simpson    MRN: [id number]         : 1980    Port Flush/CA-125    Ms. Kandice Ram was assessed and education was provided. Pt denies any complaints today. Ms. Enrique's vitals were reviewed and patient was observed for 5 minutes prior to treatment. Visit Vitals  /65   Pulse 72   Temp 98.8 °F (37.1 °C)   Resp 16   SpO2 100%   Breastfeeding No       Port accessed without complication, flushes easily with brisk blood return. CA-125 drawn per order per protocol. Line flushed with NS and heparin and de-accessed. Bandaid applied. Ms. Kandice Ram tolerated the infusion, and had no complaints. Patient armband removed and shredded. Ms. Kandice Ram was discharged from [place] [address] in stable condition at 0910. She is to return on 09/15/2021 at 1100 for her next appointment.     Carmen Adams RN  August 3, 2021  4:10 PM

## 2021-08-04 ENCOUNTER — TELEPHONE (OUTPATIENT)
Dept: ONCOLOGY | Age: 41
End: 2021-08-04

## 2021-08-04 DIAGNOSIS — R10.2 PELVIC PAIN: ICD-10-CM

## 2021-08-04 DIAGNOSIS — G89.3 CANCER ASSOCIATED PAIN: ICD-10-CM

## 2021-08-04 LAB — CANCER AG125 SERPL-ACNC: 14 U/ML (ref 1.5–35)

## 2021-08-05 RX ORDER — OXYCODONE AND ACETAMINOPHEN 5; 325 MG/1; MG/1
1 TABLET ORAL
Qty: 30 TABLET | Refills: 0 | Status: SHIPPED | OUTPATIENT
Start: 2021-08-05 | End: 2021-08-23 | Stop reason: SDUPTHER

## 2021-08-05 RX ORDER — LORAZEPAM 0.5 MG/1
0.5 TABLET ORAL
Qty: 30 TABLET | Refills: 0 | Status: SHIPPED | OUTPATIENT
Start: 2021-08-05 | End: 2021-09-08 | Stop reason: SDUPTHER

## 2021-08-15 ENCOUNTER — PATIENT MESSAGE (OUTPATIENT)
Dept: ONCOLOGY | Age: 41
End: 2021-08-15

## 2021-08-15 DIAGNOSIS — C54.1 ENDOMETRIAL CANCER (HCC): Primary | ICD-10-CM

## 2021-08-16 NOTE — TELEPHONE ENCOUNTER
From: Bethany Lee  To: Denise May MD  Sent: 8/15/2021 9:18 PM EDT  Subject: Referral Request    Good Afternoon,     Please send a referral for a colonoscopy to Dr. Cathie Juan MD @ Fax [phone], Phone [phone].      Thank you,     [facility]  [phone]

## 2021-08-23 DIAGNOSIS — G89.3 CANCER ASSOCIATED PAIN: ICD-10-CM

## 2021-08-23 RX ORDER — OXYCODONE AND ACETAMINOPHEN 5; 325 MG/1; MG/1
1 TABLET ORAL
Qty: 30 TABLET | Refills: 0 | Status: SHIPPED | OUTPATIENT
Start: 2021-08-23 | End: 2021-09-06

## 2021-08-24 ENCOUNTER — TELEPHONE (OUTPATIENT)
Dept: ONCOLOGY | Age: 41
End: 2021-08-24

## 2021-08-24 DIAGNOSIS — F51.01 PRIMARY INSOMNIA: ICD-10-CM

## 2021-08-24 NOTE — TELEPHONE ENCOUNTER
Spoke with patient to relay that the Percocet prescription is unable to be sent electronically by Dr. Elsie Ruggiero. The paper prescription is in office and available for .

## 2021-08-25 RX ORDER — ZOLPIDEM TARTRATE 10 MG/1
10 TABLET ORAL
Qty: 30 TABLET | Refills: 0 | Status: SHIPPED | OUTPATIENT
Start: 2021-08-25 | End: 2021-09-24 | Stop reason: SDUPTHER

## 2021-09-08 DIAGNOSIS — R10.2 PELVIC PAIN: ICD-10-CM

## 2021-09-09 RX ORDER — LORAZEPAM 0.5 MG/1
0.5 TABLET ORAL
Qty: 30 TABLET | Refills: 0 | Status: SHIPPED | OUTPATIENT
Start: 2021-09-09

## 2021-09-14 ENCOUNTER — HOSPITAL ENCOUNTER (OUTPATIENT)
Dept: INFUSION THERAPY | Age: 41
Discharge: HOME | End: 2021-09-14
Payer: COMMERCIAL

## 2021-09-14 VITALS
SYSTOLIC BLOOD PRESSURE: 132 MMHG | TEMPERATURE: 98.2 F | RESPIRATION RATE: 16 BRPM | HEART RATE: 117 BPM | OXYGEN SATURATION: 98 % | DIASTOLIC BLOOD PRESSURE: 86 MMHG

## 2021-09-14 DIAGNOSIS — C54.1 ENDOMETRIAL CANCER (HCC): ICD-10-CM

## 2021-09-14 PROCEDURE — 74011250636 HC RX REV CODE- 250/636

## 2021-09-14 PROCEDURE — 96523 IRRIG DRUG DELIVERY DEVICE: CPT

## 2021-09-14 PROCEDURE — 77030012965 HC NDL HUBR BBMI -A

## 2021-09-14 RX ORDER — HEPARIN 100 UNIT/ML
500 SYRINGE INTRAVENOUS ONCE
Status: ACTIVE | OUTPATIENT
Start: 2021-09-14 | End: 2021-09-14

## 2021-09-14 RX ORDER — HEPARIN 100 UNIT/ML
SYRINGE INTRAVENOUS
Status: COMPLETED
Start: 2021-09-14 | End: 2021-09-14

## 2021-09-14 RX ORDER — SODIUM CHLORIDE 0.9 % (FLUSH) 0.9 %
10 SYRINGE (ML) INJECTION AS NEEDED
Status: DISCONTINUED | OUTPATIENT
Start: 2021-09-14 | End: 2021-09-16 | Stop reason: HOSPADM

## 2021-09-14 RX ADMIN — Medication 10 ML: at 10:07

## 2021-09-14 RX ADMIN — HEPARIN 500 UNITS: 100 SYRINGE at 10:07

## 2021-09-14 NOTE — PROGRESS NOTES
[facility] Progress Note    Date: 2021    Name: Sravan Escudero    MRN: [id number]         : 1980    Port Irrigation     Ms. Lola Dillard was assessed and education was provided. Pt says she is having \"generalized achy and is walking to see if it helps\" Denies any concerns or voices any complaints. Ms. Enrique's vitals were reviewed and patient was observed for 5 minutes prior to treatment. Visit Vitals  /86 (BP 1 Location: Left arm, BP Patient Position: Sitting)   Pulse (!) 117   Temp 98.2 °F (36.8 °C)   Resp 16   SpO2 98%   Breastfeeding No         Port a cath  accessed per protocol with 20 g x 0.75\" , flushes easily with brisk blood return verified after laying patient in a suspine position. Flushed with 10 NS and 500 units heparin instilled   Bandaid applied. Ms. Lola Dillard tolerated the procedure , and had no complaints. Patient armband removed and shredded. Ms. Lola Dillard was discharged from [place] [address] in stable condition at 1015 .  She is to return on 10/26/2021 at 1000 for her next appointment for port irrigation and ca125      Sinai Eastman RN  2021

## 2021-09-16 ENCOUNTER — PATIENT MESSAGE (OUTPATIENT)
Dept: ONCOLOGY | Age: 41
End: 2021-09-16

## 2021-09-16 DIAGNOSIS — R10.2 PELVIC PAIN: Primary | ICD-10-CM

## 2021-09-17 RX ORDER — OXYCODONE AND ACETAMINOPHEN 5; 325 MG/1; MG/1
1 TABLET ORAL
Qty: 40 TABLET | Refills: 0 | Status: SHIPPED | OUTPATIENT
Start: 2021-09-17 | End: 2021-10-05 | Stop reason: SDUPTHER

## 2021-09-24 ENCOUNTER — PATIENT MESSAGE (OUTPATIENT)
Dept: ONCOLOGY | Age: 41
End: 2021-09-24

## 2021-09-24 DIAGNOSIS — F51.01 PRIMARY INSOMNIA: ICD-10-CM

## 2021-09-24 RX ORDER — ZOLPIDEM TARTRATE 10 MG/1
10 TABLET ORAL
Qty: 30 TABLET | Refills: 0 | Status: SHIPPED | OUTPATIENT
Start: 2021-09-24 | End: 2021-10-24

## 2021-10-05 DIAGNOSIS — R10.2 PELVIC PAIN: ICD-10-CM

## 2021-10-07 RX ORDER — OXYCODONE AND ACETAMINOPHEN 5; 325 MG/1; MG/1
1 TABLET ORAL
Qty: 40 TABLET | Refills: 0 | Status: SHIPPED | OUTPATIENT
Start: 2021-10-07 | End: 2021-10-21

## 2021-10-26 ENCOUNTER — HOSPITAL ENCOUNTER (OUTPATIENT)
Dept: INFUSION THERAPY | Age: 41
Discharge: HOME | End: 2021-10-26
Payer: COMMERCIAL

## 2021-10-26 VITALS
DIASTOLIC BLOOD PRESSURE: 81 MMHG | SYSTOLIC BLOOD PRESSURE: 134 MMHG | HEART RATE: 123 BPM | OXYGEN SATURATION: 96 % | TEMPERATURE: 98.1 F | RESPIRATION RATE: 16 BRPM

## 2021-10-26 PROCEDURE — 36591 DRAW BLOOD OFF VENOUS DEVICE: CPT

## 2021-10-26 PROCEDURE — 86304 IMMUNOASSAY TUMOR CA 125: CPT

## 2021-10-26 PROCEDURE — 77030012965 HC NDL HUBR BBMI -A

## 2021-10-26 PROCEDURE — 74011250636 HC RX REV CODE- 250/636

## 2021-10-26 RX ORDER — HEPARIN 100 UNIT/ML
500 SYRINGE INTRAVENOUS ONCE
Status: COMPLETED | OUTPATIENT
Start: 2021-10-26 | End: 2021-10-26

## 2021-10-26 RX ORDER — SODIUM CHLORIDE 0.9 % (FLUSH) 0.9 %
10-40 SYRINGE (ML) INJECTION AS NEEDED
Status: DISCONTINUED | OUTPATIENT
Start: 2021-10-26 | End: 2021-10-28 | Stop reason: HOSPADM

## 2021-10-26 RX ORDER — HEPARIN 100 UNIT/ML
SYRINGE INTRAVENOUS
Status: COMPLETED
Start: 2021-10-26 | End: 2021-10-26

## 2021-10-26 RX ADMIN — HEPARIN 500 UNITS: 100 SYRINGE at 10:11

## 2021-10-26 RX ADMIN — Medication 20 ML: at 10:11

## 2021-10-26 RX ADMIN — Medication 10 ML: at 10:10

## 2021-10-26 NOTE — PROGRESS NOTES
[facility] Progress Note    Date: 2021    Name: Sly Fraire    MRN: [id number]         : 1980    Im [place] [facility] Irrigation/ Lab Draw    Ms. Sinha was assessed and education was provided. Pt presents with tachycardia with HR at 123. Pt denies any chest pain, SOB. Pt encouraged to reach out to PCP, states she has an appointment tomorrow. Ms. Enrique's vitals were reviewed and patient was observed for 5 minutes prior to treatment. Visit Vitals  /81 (BP 1 Location: Left arm, BP Patient Position: Sitting)   Pulse (!) 123   Temp 98.1 °F (36.7 °C)   Resp 16   SpO2 96%   Breastfeeding No       Pt left chest port accessed per protocol with 20 g x 0.75\" , flushes easily with brisk blood return verified. Flushed with 10 NS and wasted, Lab draw for CA-125 obtained and port again flushed with 20cc NS and 500 units heparin per protocol. Band-aid applied to site. Ms. Sinha tolerated the procedure , and had no complaints. Patient armband removed and shredded. Ms. Sinha was discharged from [place] [address] in stable condition at 1015 . She is to return on 2021 at 1000 for her next appointment.       Nydia Mack RN  2021
none

## 2021-10-27 LAB — CANCER AG125 SERPL-ACNC: 14 U/ML (ref 1.5–35)

## 2021-11-03 ENCOUNTER — OFFICE VISIT (OUTPATIENT)
Dept: ONCOLOGY | Age: 41
End: 2021-11-03
Payer: COMMERCIAL

## 2021-11-03 VITALS
WEIGHT: 211 LBS | HEART RATE: 94 BPM | OXYGEN SATURATION: 96 % | BODY MASS INDEX: 33.12 KG/M2 | SYSTOLIC BLOOD PRESSURE: 129 MMHG | DIASTOLIC BLOOD PRESSURE: 75 MMHG | TEMPERATURE: 97.4 F | HEIGHT: 67 IN

## 2021-11-03 DIAGNOSIS — C54.1 ENDOMETRIAL CANCER (HCC): Primary | ICD-10-CM

## 2021-11-03 PROCEDURE — 99213 OFFICE O/P EST LOW 20 MIN: CPT | Performed by: OBSTETRICS & GYNECOLOGY

## 2021-11-03 RX ORDER — TRAZODONE HYDROCHLORIDE 50 MG/1
TABLET ORAL
COMMUNITY

## 2021-11-03 NOTE — PROGRESS NOTES
[address]  [address], [address], [address] [facility]  [address] [facility], [address] [facility], [address] [place] [address] [place]   [phone]  Chacha Gómez DO        Patient ID:  Name: Rahel Hull  MRM: [id number]  : 1980/41 y.o. Date: 11/3/2021    SUBJECTIVE:    This is a 39 y.o.  female with h/o Stage IIIBG1 endometrial caner s/p Robotic-assisted total laparoscopic hysterectomy, bilateral salpingo-oophorectomy, upper vaginectomy; bilateral pelvic and para-aortic lymph node dissection; peritoneal biopsies and washings; cystoscopy. on 2021  S/p cycle #6   of Carbo/taxol on 2021 with dose reduction of taxol at cycle #3. Here for routine surveillance. Still having some joint pains although better with Lyrica. Also complaining of continued fatigue. Eating is better. Denies vaginal bleeding. No change in bowel bladder habits. Imaging  2021  FINDINGS:     CHEST:     LUNGS: 2 mm posterior left lower lobe lung nodule (thin axial image 129). No  abnormal opacities.     PLEURA: Normal, with no effusion or pneumothorax.     AIRWAY: Normal.     MEDIASTINUM: Normal heart size. No pericardial effusion. Given the limitations  of cardiac motion, great vessels are unremarkable.     LYMPH NODES: No enlarged lymph nodes.     ===============     ABDOMEN/PELVIS:     LIVER, BILIARY: Liver is normal. No biliary dilation. Gallbladder is  unremarkable.     PANCREAS: Normal.     SPLEEN: Normal.     ADRENALS: Normal.     KIDNEYS/URETERS/BLADDER: Normal.     LYMPH NODES: No enlarged lymph nodes.     GASTROINTESTINAL TRACT: No bowel dilation or wall thickening. Unremarkable  appendix.     PELVIC ORGANS: Hysterectomy.     VASCULATURE: Unremarkable.     BONES: No acute or aggressive osseous abnormalities identified.     OTHER: No ascites.  Small fat-containing umbilical hernia.     _______________     IMPRESSION     1. Nonspecific left lower lobe lung nodule (2 mm), not imaged on prior abdominal  CT. Follow-up per oncologic protocol.     2. Otherwise, no evidence for recurrent or metastatic disease within the chest,  abdomen or pelvis. Component      Latest Ref Rng & Units 10/26/2021           9:00 AM   CA-125      1.5 - 35.0 U/mL 14     Component      Latest Ref Rng & Units 6/15/2021           9:00 AM   CA-125      1.5 - 35.0 U/mL 10     Component      Latest Ref Rng & Units 5/11/2021           8:30 AM   CA-125      1.5 - 35.0 U/mL 14     Component      Latest Ref Rng & Units 4/19/2021           9:00 AM   CA-125      1.5 - 35.0 U/mL 15     Component      Latest Ref Rng & Units 3/30/2021           9:00 AM   CA-125      1.5 - 35.0 U/mL 19     Component      Latest Ref Rng & Units 3/9/2021           9:19 AM   CA-125      1.5 - 35.0 U/mL 30     Component      Latest Ref Rng & Units 2/16/2021          11:45 AM   CA-125      1.5 - 35.0 U/mL 31       Her pathology revealed      A: CERVIX, UTERUS, BILATERAL FALLOPIAN TUBES, AND UPPER VAGINA, TOTAL HYSTERECTOMY WITH BILATERAL SALPINGO-OOPHORECTOMY AND PARTIAL VAGINECTOMY:   ENDOMETRIAL ENDOMETRIOID ADENOCARCINOMA, FIGO GRADE 1, WITH EXTENSIVE INVASION INTO THE CERVIX AND UPPER VAGINA (PLEASE SEE COMMENTS AND SYNOPTIC REPORT). SURGICAL MARGINS NEGATIVE. BENIGN BILATERAL FALLOPIAN TUBES AND OVARIES. B: PELVIC ADHESIONS, BIOPSY:   BENIGN FIBROUS TISSUE. NEGATIVE FOR MALIGNANCY. C: RIGHT PELVIC SIDEWALL PERITONEUM, BIOPSY:   ADENOCARCINOMA. D: RECTOSIGMOID EPIPLOICAE ADHESION, BIOPSY:   ADENOCARCINOMA. E: VAGINA, BIOPSY:   ACUTE INFLAMMATORY TISSUE. NO CARCINOMA SEEN. F: RIGHT PELVIC LYMPH NODE, DISSECTION:   THIRTEEN (13) BENIGN LYMPH NODES. G: LEFT PELVIC LYMPH NODE, DISSECTION:   EIGHT (8) BENIGN LYMPH NODES. H: RIGHT PARA-AORTIC LYMPH NODE, EXCISION:   ONE (1) BENIGN LYMPH NODE. I: LEFT PARA-AORTIC LYMPH NODE, EXCISION:   BENIGN FIBROADIPOSE TISSUE.    NO LYMPHOID TISSUE SEEN.   DIAGNOSIS COMMENT:   The intraoperative consultation (specimen A) is corroborated. Immunohistochemistry results for DNA mismatch repair proteins will be reported as an addendum. ENDOMETRIUM   SPECIMEN   Procedure: Total hysterectomy and bilateral salpingo-oophorectomy   TUMOR   Histologic Type: Endometrioid carcinoma, NOS   Histologic Grade: FIGO grade 1   Myometrial Invasion: Present   Depth of Myometrial Invasion (Millimeters): 6 mm   Myometrial Thickness (Millimeters): 28 mm   Percentage of Myometrial Invasion: 21 %   Uterine Serosa Involvement: Not identified   Lower Uterine Segment Involvement: Present, myoinvasive   Cervical Stromal Involvement: Present   Other Tissue / Organ Involvement: Upper Vagina, Pelvic wall, Rectosigmoid adhesion   Peritoneal Ascitic Fluid: Atypical: favor reactive   Lymphovascular Invasion: Not identified   MARGINS   Margins: Ectocervical / Vaginal Cuff Margin: Uninvolved by carcinoma   LYMPH NODES   Lymph Node Status: All lymph nodes negative for tumor cells   Total Number of Pelvic Nodes Examined: 21   Total Number of Para-aortic Nodes Examined: 1   PATHOLOGIC STAGE CLASSIFICATION (pTNM, AJCC 8th Edition)   Primary Tumor (pT): pT3b   Regional Lymph Nodes (pN): pN0   Distant Metastasis (pM): pM1   Specify Site(s): Right Pelvic sidewall peritoneum   GROSS DESCRIPTION:   The specimen is received in nine parts. TEST(S) PERFORMED   Test(s) Performed: Mismatch Repair   Immunohistochemistry (IHC) Testing for Mismatch Repair (MMR)   Proteins: MLH1, MSH2, MSH6, PMS2   MLH1: Intact nuclear expression   MSH2: Loss of nuclear expression   MSH6: Loss of nuclear expression   PMS2: Intact nuclear expression     Medications:     Current Outpatient Medications on File Prior to Visit   Medication Sig Dispense Refill    traZODone (DESYREL) 50 mg tablet Take  by mouth nightly.  pregabalin (LYRICA) 75 mg capsule Take 1 Capsule by mouth two (2) times a day.  Max Daily Amount: 150 mg. 60 Capsule 3    promethazine (PHENERGAN) 25 mg tablet Take 1 Tab by mouth every six (6) hours as needed for Nausea. 30 Tab 2    lidocaine-prilocaine (EMLA) topical cream Apply  to affected area as needed for Pain. 30 g 0    omeprazole (PriLOSEC OTC) 20 mg tablet Take 20 mg by mouth daily.  mv-min/iron/folic/calcium/vitK (WOMEN'S MULTIVITAMIN PO) Take  by mouth daily.  LORazepam (Ativan) 0.5 mg tablet Take 1 Tablet by mouth nightly as needed for Anxiety. Max Daily Amount: 0.5 mg. (Patient not taking: Reported on 10/26/2021) 30 Tablet 0    ondansetron hcl (ZOFRAN) 4 mg tablet Take 1 Tablet by mouth every eight (8) hours as needed for Nausea or Vomiting. (Patient not taking: Reported on 7/13/2021) 30 Tablet 2    magic mouthwash solution Magic mouth wash   Maalox  Lidocaine 2% viscous   Diphenhydramine oral solution     Pharmacy to mix equal portions of ingredients to a total volume as indicated in the dispense amount. Indications: stomatitis, a condition with painful swelling and sores inside the mouth, Swish and spit 5-10 mL PRN every 6 hours for mouth sores. (Patient not taking: Reported on 7/13/2021) 250 mL 0    megestroL (MEGACE) 400 mg/10 mL (10 mL) suspension Take 10 mL by mouth two (2) times a day. (Patient not taking: Reported on 7/13/2021) 1 Bottle 3    ondansetron hcl (Zofran) 4 mg tablet Take 1 Tab by mouth every six (6) hours. 30 Tab 0    cyclobenzaprine (FLEXERIL) 10 mg tablet Take  by mouth three (3) times daily as needed for Muscle Spasm(s). (Patient not taking: Reported on 7/13/2021)       No current facility-administered medications on file prior to visit. Allergies:      Allergies   Allergen Reactions    Codeine Hives       OBJECTIVE:    Vitals:   Visit Vitals  /75 (BP 1 Location: Right upper arm, BP Patient Position: Sitting)   Pulse 94   Temp 97.4 °F (36.3 °C) (Oral)   Ht 5' 7\" (1.702 m)   Wt 95.7 kg (211 lb)   LMP 01/09/2021 (Approximate)   SpO2 96%   BMI 33.05 kg/m² Physical Examination:    General:  alert, cooperative, no distress   Abdomen: soft, non-tender   Incision: healed   Pelvic: NEFG, spec exam revealed normal mucosa, no masses. BME revealed no masses   Rectal: not done   Extremity:   extremities normal, atraumatic, no cyanosis or edema     IMPRESSION/PLAN:    Stage IIIBG1 endometrial cancer  The operative procedures and clinical results have been reviewed with the patient. Previously Discussed recommendation for adjuvant chemotherapy  \s/p carbo auc=6, taxol 175 mg/m2 IV every 3 wks x 6 cycles. Caris: BRCA2, MSI-H  Invitae: MSH2 pathogenic c/w park syndrome. Will refer to GI once complete  Decreased appetite-improved  Chemo induced anemia: resolved  Constipation- senokot, colace-improved  Body aches- percocet prn. Insomnia- ambien prn    Pelvic exam performed today  Neuropathy- given G3 neuropathy  S/p reduce taxol to 135 mg/m2. increase lyrica to 75 mg bid  Previously Reviewed CT with on ly finding of 3 mm lung nodule. Will plan to repeat in 3 months-ordered today  Discussed surveillance strategy and signs/symptoms of recurrence  Plan for f/u in 3 months      Total time spent was 40 minutes regarding diagnosis of endometrial cancer and >50% of this time was spent counseling and coordinating care.   Ivan Carvajal DO  Gynecologic Oncology  11/3/2021/12:59 PM

## 2021-11-03 NOTE — PROGRESS NOTES
Stanley Espana is a 39 y.o. female presents in office for 3 mo endometrial cancer surveillance. Pt reports joint pain today more in hands and feet. Pt denies any gyn complaints. Chief Complaint   Patient presents with    Follow-up     endometrial ca           Visit Vitals  /75 (BP 1 Location: Right upper arm, BP Patient Position: Sitting)   Pulse 94   Temp 97.4 °F (36.3 °C) (Oral)   Ht 5' 7\" (1.702 m)   Wt 211 lb (95.7 kg)   SpO2 96%   BMI 33.05 kg/m²         1. Have you been to the ER, urgent care clinic since your last visit? Hospitalized since your last visit? No    2. Have you seen or consulted any other health care providers outside of the [address] [place] [address] since your last visit? Include any pap smears or colon screening. No    3 most recent PHQ Screens 11/3/2021   Little interest or pleasure in doing things Not at all   Feeling down, depressed, irritable, or hopeless Not at all   Total Score PHQ 2 0       No flowsheet data found. No flowsheet data found.     Learning Assessment 3/9/2021   PRIMARY LEARNER Patient   BARRIERS PRIMARY LEARNER NONE   CO-LEARNER CAREGIVER No   PRIMARY LANGUAGE ENGLISH   LEARNER PREFERENCE PRIMARY DEMONSTRATION   ANSWERED BY Patient   RELATIONSHIP SELF

## 2021-11-14 ENCOUNTER — PATIENT MESSAGE (OUTPATIENT)
Dept: ONCOLOGY | Age: 41
End: 2021-11-14

## 2021-11-15 NOTE — TELEPHONE ENCOUNTER
Called and discussed given 6 months since chemotherapy would not feel comfortable prescribing her narcotic. Encouraged her to use Tylenol arthritis at this point she could use ibuprofen or Motrin.   Recommend follow-up with PCP if continued joint pain and for possible referral.  Patient agreeable

## 2021-11-15 NOTE — TELEPHONE ENCOUNTER
Regarding: Joint Pain  ----- Message from Aylin Fink sent at 11/15/2021 11:05 AM EST -----       ----- Message from Luis A IGNACIO to Lucrecia Lesch, MD sent at 11/14/2021  5:32 PM -----   Please refill oxyCODONE-acetaminophen  mg/5 mL.

## 2021-11-15 NOTE — TELEPHONE ENCOUNTER
From: Heather Renee  To: Andreea Lopez MD  Sent: 11/14/2021 5:32 PM EST  Subject: Joint Pain    Please refill oxyCODONE-acetaminophen  mg/5 mL.

## 2021-11-16 ENCOUNTER — HOSPITAL ENCOUNTER (OUTPATIENT)
Dept: CT IMAGING | Age: 41
Discharge: HOME | End: 2021-11-16
Attending: OBSTETRICS & GYNECOLOGY
Payer: COMMERCIAL

## 2021-11-16 DIAGNOSIS — C54.1 ENDOMETRIAL CANCER (HCC): ICD-10-CM

## 2021-11-16 PROCEDURE — 74011000636 HC RX REV CODE- 636: Performed by: OBSTETRICS & GYNECOLOGY

## 2021-11-16 PROCEDURE — 71260 CT THORAX DX C+: CPT

## 2021-11-16 PROCEDURE — 74011250636 HC RX REV CODE- 250/636

## 2021-11-16 PROCEDURE — 74011000250 HC RX REV CODE- 250: Performed by: OBSTETRICS & GYNECOLOGY

## 2021-11-16 RX ORDER — HEPARIN SODIUM (PORCINE) LOCK FLUSH IV SOLN 100 UNIT/ML 100 UNIT/ML
500 SOLUTION INTRAVENOUS AS NEEDED
Status: DISCONTINUED | OUTPATIENT
Start: 2021-11-16 | End: 2021-11-20 | Stop reason: HOSPADM

## 2021-11-16 RX ORDER — BARIUM SULFATE 20 MG/ML
900 SUSPENSION ORAL
Status: COMPLETED | OUTPATIENT
Start: 2021-11-16 | End: 2021-11-16

## 2021-11-16 RX ORDER — HEPARIN SODIUM (PORCINE) LOCK FLUSH IV SOLN 100 UNIT/ML 100 UNIT/ML
SOLUTION INTRAVENOUS
Status: COMPLETED
Start: 2021-11-16 | End: 2021-11-16

## 2021-11-16 RX ADMIN — BARIUM SULFATE 900 ML: 20 SUSPENSION ORAL at 14:11

## 2021-11-16 RX ADMIN — HEPARIN SODIUM (PORCINE) LOCK FLUSH IV SOLN 100 UNIT/ML 500 UNITS: 100 SOLUTION at 14:16

## 2021-11-16 RX ADMIN — IOPAMIDOL 100 ML: 612 INJECTION, SOLUTION INTRAVENOUS at 14:11

## 2021-12-06 ENCOUNTER — ANESTHESIA (OUTPATIENT)
Dept: SURGERY | Age: 41
End: 2021-12-06
Payer: COMMERCIAL

## 2021-12-06 ENCOUNTER — HOSPITAL ENCOUNTER (OUTPATIENT)
Age: 41
Setting detail: OBSERVATION
Discharge: HOME | End: 2021-12-06
Attending: STUDENT IN AN ORGANIZED HEALTH CARE EDUCATION/TRAINING PROGRAM | Admitting: SURGERY
Payer: COMMERCIAL

## 2021-12-06 ENCOUNTER — APPOINTMENT (OUTPATIENT)
Dept: GENERAL RADIOLOGY | Age: 41
End: 2021-12-06
Attending: STUDENT IN AN ORGANIZED HEALTH CARE EDUCATION/TRAINING PROGRAM
Payer: COMMERCIAL

## 2021-12-06 ENCOUNTER — TELEPHONE (OUTPATIENT)
Dept: ONCOLOGY | Age: 41
End: 2021-12-06

## 2021-12-06 ENCOUNTER — APPOINTMENT (OUTPATIENT)
Dept: CT IMAGING | Age: 41
End: 2021-12-06
Attending: STUDENT IN AN ORGANIZED HEALTH CARE EDUCATION/TRAINING PROGRAM
Payer: COMMERCIAL

## 2021-12-06 ENCOUNTER — ANESTHESIA EVENT (OUTPATIENT)
Dept: SURGERY | Age: 41
End: 2021-12-06
Payer: COMMERCIAL

## 2021-12-06 VITALS
WEIGHT: 212 LBS | TEMPERATURE: 98 F | HEART RATE: 89 BPM | RESPIRATION RATE: 20 BRPM | OXYGEN SATURATION: 95 % | SYSTOLIC BLOOD PRESSURE: 115 MMHG | DIASTOLIC BLOOD PRESSURE: 72 MMHG | BODY MASS INDEX: 33.2 KG/M2

## 2021-12-06 DIAGNOSIS — Z85.9 HISTORY OF CANCER: ICD-10-CM

## 2021-12-06 DIAGNOSIS — H10.9 CONJUNCTIVITIS OF BOTH EYES, UNSPECIFIED CONJUNCTIVITIS TYPE: ICD-10-CM

## 2021-12-06 DIAGNOSIS — N30.00 ACUTE CYSTITIS WITHOUT HEMATURIA: ICD-10-CM

## 2021-12-06 DIAGNOSIS — R10.31 ABDOMINAL PAIN, RIGHT LOWER QUADRANT: ICD-10-CM

## 2021-12-06 DIAGNOSIS — K37 APPENDICITIS, UNSPECIFIED APPENDICITIS TYPE: Primary | ICD-10-CM

## 2021-12-06 LAB
ALBUMIN SERPL-MCNC: 3.2 G/DL (ref 3.4–5)
ALBUMIN/GLOB SERPL: 0.8 {RATIO} (ref 0.8–1.7)
ALP SERPL-CCNC: 169 U/L (ref 45–117)
ALT SERPL-CCNC: 21 U/L (ref 13–56)
ANION GAP SERPL CALC-SCNC: 6 MMOL/L (ref 3–18)
APPEARANCE UR: ABNORMAL
AST SERPL-CCNC: 13 U/L (ref 10–38)
ATRIAL RATE: 106 BPM
BACTERIA URNS QL MICRO: ABNORMAL /HPF
BASOPHILS # BLD: 0 K/UL (ref 0–0.1)
BASOPHILS NFR BLD: 0 % (ref 0–2)
BILIRUB DIRECT SERPL-MCNC: 0.1 MG/DL (ref 0–0.2)
BILIRUB SERPL-MCNC: 0.4 MG/DL (ref 0.2–1)
BILIRUB UR QL: NEGATIVE
BUN SERPL-MCNC: 14 MG/DL (ref 7–18)
BUN/CREAT SERPL: 16 (ref 12–20)
CALCIUM SERPL-MCNC: 9.1 MG/DL (ref 8.5–10.1)
CALCULATED P AXIS, ECG09: 19 DEGREES
CALCULATED R AXIS, ECG10: 16 DEGREES
CALCULATED T AXIS, ECG11: 0 DEGREES
CHLORIDE SERPL-SCNC: 108 MMOL/L (ref 100–111)
CO2 SERPL-SCNC: 27 MMOL/L (ref 21–32)
COLOR UR: YELLOW
CREAT SERPL-MCNC: 0.86 MG/DL (ref 0.6–1.3)
DIAGNOSIS, 93000: NORMAL
DIFFERENTIAL METHOD BLD: ABNORMAL
EOSINOPHIL # BLD: 0 K/UL (ref 0–0.4)
EOSINOPHIL NFR BLD: 0 % (ref 0–5)
EPITH CASTS URNS QL MICRO: ABNORMAL /LPF (ref 0–5)
ERYTHROCYTE [DISTWIDTH] IN BLOOD BY AUTOMATED COUNT: 11.9 % (ref 11.6–14.5)
GLOBULIN SER CALC-MCNC: 4 G/DL (ref 2–4)
GLUCOSE SERPL-MCNC: 116 MG/DL (ref 74–99)
GLUCOSE UR STRIP.AUTO-MCNC: NEGATIVE MG/DL
HCT VFR BLD AUTO: 42.2 % (ref 35–45)
HGB BLD-MCNC: 13.7 G/DL (ref 12–16)
HGB UR QL STRIP: ABNORMAL
IMM GRANULOCYTES # BLD AUTO: 0.1 K/UL (ref 0–0.04)
IMM GRANULOCYTES NFR BLD AUTO: 0 % (ref 0–0.5)
KETONES UR QL STRIP.AUTO: NEGATIVE MG/DL
LEUKOCYTE ESTERASE UR QL STRIP.AUTO: ABNORMAL
LIPASE SERPL-CCNC: 75 U/L (ref 73–393)
LYMPHOCYTES # BLD: 0.9 K/UL (ref 0.9–3.6)
LYMPHOCYTES NFR BLD: 7 % (ref 21–52)
MCH RBC QN AUTO: 30.1 PG (ref 24–34)
MCHC RBC AUTO-ENTMCNC: 32.5 G/DL (ref 31–37)
MCV RBC AUTO: 92.7 FL (ref 78–100)
MONOCYTES # BLD: 0.7 K/UL (ref 0.05–1.2)
MONOCYTES NFR BLD: 6 % (ref 3–10)
NEUTS SEG # BLD: 9.9 K/UL (ref 1.8–8)
NEUTS SEG NFR BLD: 85 % (ref 40–73)
NITRITE UR QL STRIP.AUTO: POSITIVE
NRBC # BLD: 0 K/UL (ref 0–0.01)
NRBC BLD-RTO: 0 PER 100 WBC
P-R INTERVAL, ECG05: 128 MS
PH UR STRIP: 6.5 [PH] (ref 5–8)
PLATELET # BLD AUTO: 248 K/UL (ref 135–420)
PMV BLD AUTO: 10.1 FL (ref 9.2–11.8)
POTASSIUM SERPL-SCNC: 3.9 MMOL/L (ref 3.5–5.5)
PROT SERPL-MCNC: 7.2 G/DL (ref 6.4–8.2)
PROT UR STRIP-MCNC: 100 MG/DL
Q-T INTERVAL, ECG07: 342 MS
QRS DURATION, ECG06: 72 MS
QTC CALCULATION (BEZET), ECG08: 454 MS
RBC # BLD AUTO: 4.55 M/UL (ref 4.2–5.3)
RBC #/AREA URNS HPF: ABNORMAL /HPF (ref 0–5)
SODIUM SERPL-SCNC: 141 MMOL/L (ref 136–145)
SP GR UR REFRACTOMETRY: 1.02 (ref 1–1.03)
UROBILINOGEN UR QL STRIP.AUTO: 0.2 EU/DL (ref 0.2–1)
VENTRICULAR RATE, ECG03: 106 BPM
WBC # BLD AUTO: 11.6 K/UL (ref 4.6–13.2)
WBC URNS QL MICRO: ABNORMAL /HPF (ref 0–5)

## 2021-12-06 PROCEDURE — 77030010507 HC ADH SKN DERMBND J&J -B: Performed by: SURGERY

## 2021-12-06 PROCEDURE — 80076 HEPATIC FUNCTION PANEL: CPT

## 2021-12-06 PROCEDURE — 77030006643: Performed by: STUDENT IN AN ORGANIZED HEALTH CARE EDUCATION/TRAINING PROGRAM

## 2021-12-06 PROCEDURE — 96375 TX/PRO/DX INJ NEW DRUG ADDON: CPT

## 2021-12-06 PROCEDURE — 85025 COMPLETE CBC W/AUTO DIFF WBC: CPT

## 2021-12-06 PROCEDURE — 77030003578 HC NDL INSUF VERES AMR -B: Performed by: SURGERY

## 2021-12-06 PROCEDURE — 76210000063 HC OR PH I REC FIRST 0.5 HR: Performed by: SURGERY

## 2021-12-06 PROCEDURE — 77030020782 HC GWN BAIR PAWS FLX 3M -B: Performed by: SURGERY

## 2021-12-06 PROCEDURE — 96361 HYDRATE IV INFUSION ADD-ON: CPT

## 2021-12-06 PROCEDURE — 87077 CULTURE AEROBIC IDENTIFY: CPT

## 2021-12-06 PROCEDURE — 77030009967 HC RELD STPLR ENDOSC J&J -C: Performed by: SURGERY

## 2021-12-06 PROCEDURE — 74011000258 HC RX REV CODE- 258: Performed by: STUDENT IN AN ORGANIZED HEALTH CARE EDUCATION/TRAINING PROGRAM

## 2021-12-06 PROCEDURE — 77030002933 HC SUT MCRYL J&J -A: Performed by: SURGERY

## 2021-12-06 PROCEDURE — 74011250636 HC RX REV CODE- 250/636: Performed by: STUDENT IN AN ORGANIZED HEALTH CARE EDUCATION/TRAINING PROGRAM

## 2021-12-06 PROCEDURE — 77030008608 HC TRCR ENDOSC SMTH AMR -B: Performed by: SURGERY

## 2021-12-06 PROCEDURE — G0378 HOSPITAL OBSERVATION PER HR: HCPCS

## 2021-12-06 PROCEDURE — 74011250636 HC RX REV CODE- 250/636: Performed by: NURSE ANESTHETIST, CERTIFIED REGISTERED

## 2021-12-06 PROCEDURE — 77030008477 HC STYL SATN SLP COVD -A: Performed by: STUDENT IN AN ORGANIZED HEALTH CARE EDUCATION/TRAINING PROGRAM

## 2021-12-06 PROCEDURE — 76060000032 HC ANESTHESIA 0.5 TO 1 HR: Performed by: SURGERY

## 2021-12-06 PROCEDURE — 77030032060 HC PWDR HEMSTAT ARISTA ASRB 3GM BARD -C: Performed by: SURGERY

## 2021-12-06 PROCEDURE — 77030018875 HC APPL CLP LIG4 J&J -B: Performed by: SURGERY

## 2021-12-06 PROCEDURE — 76010000138 HC OR TIME 0.5 TO 1 HR: Performed by: SURGERY

## 2021-12-06 PROCEDURE — 74011000250 HC RX REV CODE- 250: Performed by: NURSE ANESTHETIST, CERTIFIED REGISTERED

## 2021-12-06 PROCEDURE — 93005 ELECTROCARDIOGRAM TRACING: CPT

## 2021-12-06 PROCEDURE — 83690 ASSAY OF LIPASE: CPT

## 2021-12-06 PROCEDURE — 96376 TX/PRO/DX INJ SAME DRUG ADON: CPT

## 2021-12-06 PROCEDURE — 88304 TISSUE EXAM BY PATHOLOGIST: CPT

## 2021-12-06 PROCEDURE — 77030016151 HC PROTCTR LNS DFOG COVD -B: Performed by: SURGERY

## 2021-12-06 PROCEDURE — 77030008683 HC TU ET CUF COVD -A: Performed by: STUDENT IN AN ORGANIZED HEALTH CARE EDUCATION/TRAINING PROGRAM

## 2021-12-06 PROCEDURE — 77030008518 HC TBNG INSUF ENDO STRY -B: Performed by: SURGERY

## 2021-12-06 PROCEDURE — 81001 URINALYSIS AUTO W/SCOPE: CPT

## 2021-12-06 PROCEDURE — 2709999900 HC NON-CHARGEABLE SUPPLY: Performed by: SURGERY

## 2021-12-06 PROCEDURE — 77030012770 HC TRCR OPT FX AMR -B: Performed by: SURGERY

## 2021-12-06 PROCEDURE — 74011250636 HC RX REV CODE- 250/636: Performed by: SURGERY

## 2021-12-06 PROCEDURE — 77030008574 HC TBNG SUC IRR STRY -B: Performed by: SURGERY

## 2021-12-06 PROCEDURE — 96365 THER/PROPH/DIAG IV INF INIT: CPT

## 2021-12-06 PROCEDURE — 74011000250 HC RX REV CODE- 250: Performed by: SURGERY

## 2021-12-06 PROCEDURE — 77030040361 HC SLV COMPR DVT MDII -B: Performed by: SURGERY

## 2021-12-06 PROCEDURE — 71045 X-RAY EXAM CHEST 1 VIEW: CPT

## 2021-12-06 PROCEDURE — 74011250637 HC RX REV CODE- 250/637: Performed by: STUDENT IN AN ORGANIZED HEALTH CARE EDUCATION/TRAINING PROGRAM

## 2021-12-06 PROCEDURE — 74011000272 HC RX REV CODE- 272: Performed by: SURGERY

## 2021-12-06 PROCEDURE — 77030031139 HC SUT VCRL2 J&J -A: Performed by: SURGERY

## 2021-12-06 PROCEDURE — 74011000258 HC RX REV CODE- 258: Performed by: SURGERY

## 2021-12-06 PROCEDURE — 74177 CT ABD & PELVIS W/CONTRAST: CPT

## 2021-12-06 PROCEDURE — 77030014008 HC SPNG HEMSTAT J&J -C: Performed by: SURGERY

## 2021-12-06 PROCEDURE — 77030032230 HC DSCTR ULTSONC CRDLSS COVD -E: Performed by: SURGERY

## 2021-12-06 PROCEDURE — 77030027743 HC APPL F/HEMSTAT BARD -B: Performed by: SURGERY

## 2021-12-06 PROCEDURE — 87186 SC STD MICRODIL/AGAR DIL: CPT

## 2021-12-06 PROCEDURE — 77030009851 HC PCH RTVR ENDOSC AMR -B: Performed by: SURGERY

## 2021-12-06 PROCEDURE — 87086 URINE CULTURE/COLONY COUNT: CPT

## 2021-12-06 PROCEDURE — 99285 EMERGENCY DEPT VISIT HI MDM: CPT

## 2021-12-06 PROCEDURE — 80048 BASIC METABOLIC PNL TOTAL CA: CPT

## 2021-12-06 PROCEDURE — 74011000636 HC RX REV CODE- 636: Performed by: STUDENT IN AN ORGANIZED HEALTH CARE EDUCATION/TRAINING PROGRAM

## 2021-12-06 PROCEDURE — 77030020829: Performed by: SURGERY

## 2021-12-06 PROCEDURE — 74011250637 HC RX REV CODE- 250/637: Performed by: SURGERY

## 2021-12-06 RX ORDER — NALOXONE HYDROCHLORIDE 0.4 MG/ML
0.04 INJECTION, SOLUTION INTRAMUSCULAR; INTRAVENOUS; SUBCUTANEOUS
Status: DISCONTINUED | OUTPATIENT
Start: 2021-12-06 | End: 2021-12-06 | Stop reason: HOSPADM

## 2021-12-06 RX ORDER — SCOLOPAMINE TRANSDERMAL SYSTEM 1 MG/1
1 PATCH, EXTENDED RELEASE TRANSDERMAL
Status: DISCONTINUED | OUTPATIENT
Start: 2021-12-06 | End: 2021-12-06 | Stop reason: HOSPADM

## 2021-12-06 RX ORDER — HYDROCODONE BITARTRATE AND ACETAMINOPHEN 5; 325 MG/1; MG/1
1 TABLET ORAL
Status: DISCONTINUED | OUTPATIENT
Start: 2021-12-06 | End: 2021-12-06 | Stop reason: HOSPADM

## 2021-12-06 RX ORDER — CEPHALEXIN 500 MG/1
500 CAPSULE ORAL 4 TIMES DAILY
Qty: 28 CAPSULE | Refills: 0 | Status: SHIPPED | OUTPATIENT
Start: 2021-12-06 | End: 2021-12-13

## 2021-12-06 RX ORDER — CIPROFLOXACIN HYDROCHLORIDE 3.5 MG/ML
1 SOLUTION/ DROPS TOPICAL EVERY 4 HOURS
Status: DISCONTINUED | OUTPATIENT
Start: 2021-12-06 | End: 2021-12-06 | Stop reason: HOSPADM

## 2021-12-06 RX ORDER — MIDAZOLAM HYDROCHLORIDE 1 MG/ML
INJECTION, SOLUTION INTRAMUSCULAR; INTRAVENOUS AS NEEDED
Status: DISCONTINUED | OUTPATIENT
Start: 2021-12-06 | End: 2021-12-06 | Stop reason: HOSPADM

## 2021-12-06 RX ORDER — HYDROCODONE BITARTRATE AND ACETAMINOPHEN 5; 325 MG/1; MG/1
1 TABLET ORAL
Qty: 20 TABLET | Refills: 0 | Status: SHIPPED | OUTPATIENT
Start: 2021-12-06 | End: 2021-12-11

## 2021-12-06 RX ORDER — SODIUM CHLORIDE 9 MG/ML
125 INJECTION, SOLUTION INTRAVENOUS CONTINUOUS
Status: DISCONTINUED | OUTPATIENT
Start: 2021-12-06 | End: 2021-12-06 | Stop reason: HOSPADM

## 2021-12-06 RX ORDER — SODIUM CHLORIDE, SODIUM LACTATE, POTASSIUM CHLORIDE, CALCIUM CHLORIDE 600; 310; 30; 20 MG/100ML; MG/100ML; MG/100ML; MG/100ML
INJECTION, SOLUTION INTRAVENOUS
Status: DISCONTINUED | OUTPATIENT
Start: 2021-12-06 | End: 2021-12-06 | Stop reason: HOSPADM

## 2021-12-06 RX ORDER — LIDOCAINE HYDROCHLORIDE 20 MG/ML
INJECTION, SOLUTION EPIDURAL; INFILTRATION; INTRACAUDAL; PERINEURAL AS NEEDED
Status: DISCONTINUED | OUTPATIENT
Start: 2021-12-06 | End: 2021-12-06 | Stop reason: HOSPADM

## 2021-12-06 RX ORDER — GLUCOSAMINE SULFATE 1500 MG
POWDER IN PACKET (EA) ORAL DAILY
COMMUNITY

## 2021-12-06 RX ORDER — NALOXONE HYDROCHLORIDE 4 MG/.1ML
SPRAY NASAL
Qty: 1 EACH | Refills: 0 | Status: SHIPPED | OUTPATIENT
Start: 2021-12-06

## 2021-12-06 RX ORDER — CIPROFLOXACIN HYDROCHLORIDE 3.5 MG/ML
1 SOLUTION/ DROPS TOPICAL
Qty: 5 ML | Refills: 1 | Status: SHIPPED | OUTPATIENT
Start: 2021-12-06 | End: 2021-12-13

## 2021-12-06 RX ORDER — FENTANYL CITRATE 50 UG/ML
50 INJECTION, SOLUTION INTRAMUSCULAR; INTRAVENOUS
Status: DISCONTINUED | OUTPATIENT
Start: 2021-12-06 | End: 2021-12-06 | Stop reason: HOSPADM

## 2021-12-06 RX ORDER — HYDROMORPHONE HYDROCHLORIDE 1 MG/ML
1 INJECTION, SOLUTION INTRAMUSCULAR; INTRAVENOUS; SUBCUTANEOUS
Status: DISCONTINUED | OUTPATIENT
Start: 2021-12-06 | End: 2021-12-06 | Stop reason: HOSPADM

## 2021-12-06 RX ORDER — DEXAMETHASONE SODIUM PHOSPHATE 4 MG/ML
INJECTION, SOLUTION INTRA-ARTICULAR; INTRALESIONAL; INTRAMUSCULAR; INTRAVENOUS; SOFT TISSUE AS NEEDED
Status: DISCONTINUED | OUTPATIENT
Start: 2021-12-06 | End: 2021-12-06 | Stop reason: HOSPADM

## 2021-12-06 RX ORDER — DIPHENHYDRAMINE HYDROCHLORIDE 50 MG/ML
12.5 INJECTION, SOLUTION INTRAMUSCULAR; INTRAVENOUS
Status: DISCONTINUED | OUTPATIENT
Start: 2021-12-06 | End: 2021-12-06 | Stop reason: HOSPADM

## 2021-12-06 RX ORDER — SUCCINYLCHOLINE CHLORIDE 100 MG/5ML
SYRINGE (ML) INTRAVENOUS AS NEEDED
Status: DISCONTINUED | OUTPATIENT
Start: 2021-12-06 | End: 2021-12-06 | Stop reason: HOSPADM

## 2021-12-06 RX ORDER — NALBUPHINE HYDROCHLORIDE 10 MG/ML
5 INJECTION, SOLUTION INTRAMUSCULAR; INTRAVENOUS; SUBCUTANEOUS
Status: DISCONTINUED | OUTPATIENT
Start: 2021-12-06 | End: 2021-12-06 | Stop reason: HOSPADM

## 2021-12-06 RX ORDER — BUPIVACAINE HYDROCHLORIDE 2.5 MG/ML
INJECTION, SOLUTION EPIDURAL; INFILTRATION; INTRACAUDAL AS NEEDED
Status: DISCONTINUED | OUTPATIENT
Start: 2021-12-06 | End: 2021-12-06 | Stop reason: HOSPADM

## 2021-12-06 RX ORDER — FENTANYL CITRATE 50 UG/ML
INJECTION, SOLUTION INTRAMUSCULAR; INTRAVENOUS AS NEEDED
Status: DISCONTINUED | OUTPATIENT
Start: 2021-12-06 | End: 2021-12-06 | Stop reason: HOSPADM

## 2021-12-06 RX ORDER — PROPOFOL 10 MG/ML
INJECTION, EMULSION INTRAVENOUS AS NEEDED
Status: DISCONTINUED | OUTPATIENT
Start: 2021-12-06 | End: 2021-12-06 | Stop reason: HOSPADM

## 2021-12-06 RX ORDER — ONDANSETRON 2 MG/ML
INJECTION INTRAMUSCULAR; INTRAVENOUS AS NEEDED
Status: DISCONTINUED | OUTPATIENT
Start: 2021-12-06 | End: 2021-12-06 | Stop reason: HOSPADM

## 2021-12-06 RX ORDER — SODIUM CHLORIDE, SODIUM LACTATE, POTASSIUM CHLORIDE, CALCIUM CHLORIDE 600; 310; 30; 20 MG/100ML; MG/100ML; MG/100ML; MG/100ML
125 INJECTION, SOLUTION INTRAVENOUS CONTINUOUS
Status: DISCONTINUED | OUTPATIENT
Start: 2021-12-06 | End: 2021-12-06

## 2021-12-06 RX ORDER — ONDANSETRON 4 MG/1
4 TABLET, FILM COATED ORAL
Qty: 20 TABLET | Refills: 0 | Status: SHIPPED | OUTPATIENT
Start: 2021-12-06

## 2021-12-06 RX ORDER — ONDANSETRON 2 MG/ML
4 INJECTION INTRAMUSCULAR; INTRAVENOUS
Status: COMPLETED | OUTPATIENT
Start: 2021-12-06 | End: 2021-12-06

## 2021-12-06 RX ORDER — SODIUM CHLORIDE, SODIUM LACTATE, POTASSIUM CHLORIDE, CALCIUM CHLORIDE 600; 310; 30; 20 MG/100ML; MG/100ML; MG/100ML; MG/100ML
50 INJECTION, SOLUTION INTRAVENOUS CONTINUOUS
Status: DISCONTINUED | OUTPATIENT
Start: 2021-12-06 | End: 2021-12-06 | Stop reason: HOSPADM

## 2021-12-06 RX ORDER — CEFTRIAXONE 1 G/1
1 INJECTION, POWDER, FOR SOLUTION INTRAMUSCULAR; INTRAVENOUS
Status: DISCONTINUED | OUTPATIENT
Start: 2021-12-06 | End: 2021-12-06 | Stop reason: CLARIF

## 2021-12-06 RX ORDER — HYDROMORPHONE HYDROCHLORIDE 1 MG/ML
0.5 INJECTION, SOLUTION INTRAMUSCULAR; INTRAVENOUS; SUBCUTANEOUS AS NEEDED
Status: DISCONTINUED | OUTPATIENT
Start: 2021-12-06 | End: 2021-12-06 | Stop reason: HOSPADM

## 2021-12-06 RX ORDER — ROCURONIUM BROMIDE 10 MG/ML
INJECTION, SOLUTION INTRAVENOUS AS NEEDED
Status: DISCONTINUED | OUTPATIENT
Start: 2021-12-06 | End: 2021-12-06 | Stop reason: HOSPADM

## 2021-12-06 RX ORDER — SODIUM CHLORIDE 0.9 % (FLUSH) 0.9 %
5-40 SYRINGE (ML) INJECTION AS NEEDED
Status: DISCONTINUED | OUTPATIENT
Start: 2021-12-06 | End: 2021-12-06 | Stop reason: HOSPADM

## 2021-12-06 RX ORDER — ONDANSETRON 2 MG/ML
4 INJECTION INTRAMUSCULAR; INTRAVENOUS
Status: DISCONTINUED | OUTPATIENT
Start: 2021-12-06 | End: 2021-12-06 | Stop reason: HOSPADM

## 2021-12-06 RX ORDER — ACETAMINOPHEN 325 MG/1
650 TABLET ORAL
Status: DISCONTINUED | OUTPATIENT
Start: 2021-12-06 | End: 2021-12-06 | Stop reason: HOSPADM

## 2021-12-06 RX ORDER — SODIUM CHLORIDE 0.9 % (FLUSH) 0.9 %
5-40 SYRINGE (ML) INJECTION EVERY 8 HOURS
Status: DISCONTINUED | OUTPATIENT
Start: 2021-12-06 | End: 2021-12-06 | Stop reason: HOSPADM

## 2021-12-06 RX ORDER — FENTANYL CITRATE 50 UG/ML
50 INJECTION, SOLUTION INTRAMUSCULAR; INTRAVENOUS
Status: COMPLETED | OUTPATIENT
Start: 2021-12-06 | End: 2021-12-06

## 2021-12-06 RX ORDER — HYDROMORPHONE HYDROCHLORIDE 2 MG/ML
INJECTION, SOLUTION INTRAMUSCULAR; INTRAVENOUS; SUBCUTANEOUS AS NEEDED
Status: DISCONTINUED | OUTPATIENT
Start: 2021-12-06 | End: 2021-12-06 | Stop reason: HOSPADM

## 2021-12-06 RX ADMIN — MIDAZOLAM 2 MG: 1 INJECTION INTRAMUSCULAR; INTRAVENOUS at 14:57

## 2021-12-06 RX ADMIN — FENTANYL CITRATE 50 MCG: 50 INJECTION, SOLUTION INTRAMUSCULAR; INTRAVENOUS at 15:25

## 2021-12-06 RX ADMIN — FENTANYL CITRATE 50 MCG: 50 INJECTION, SOLUTION INTRAMUSCULAR; INTRAVENOUS at 15:02

## 2021-12-06 RX ADMIN — AMPICILLIN AND SULBACTAM 3 G: 2; 1 INJECTION, POWDER, FOR SOLUTION INTRAMUSCULAR; INTRAVENOUS at 11:22

## 2021-12-06 RX ADMIN — HYDROMORPHONE HYDROCHLORIDE 0.6 MG: 2 INJECTION, SOLUTION INTRAMUSCULAR; INTRAVENOUS; SUBCUTANEOUS at 15:44

## 2021-12-06 RX ADMIN — ROCURONIUM BROMIDE 30 MG: 10 INJECTION, SOLUTION INTRAVENOUS at 15:09

## 2021-12-06 RX ADMIN — SODIUM CHLORIDE, POTASSIUM CHLORIDE, SODIUM LACTATE AND CALCIUM CHLORIDE 1000 ML: 600; 310; 30; 20 INJECTION, SOLUTION INTRAVENOUS at 08:11

## 2021-12-06 RX ADMIN — SODIUM CHLORIDE, SODIUM LACTATE, POTASSIUM CHLORIDE, AND CALCIUM CHLORIDE: 600; 310; 30; 20 INJECTION, SOLUTION INTRAVENOUS at 14:59

## 2021-12-06 RX ADMIN — IOPAMIDOL 100 ML: 612 INJECTION, SOLUTION INTRAVENOUS at 09:28

## 2021-12-06 RX ADMIN — HYDROMORPHONE HYDROCHLORIDE 0.5 MG: 1 INJECTION, SOLUTION INTRAMUSCULAR; INTRAVENOUS; SUBCUTANEOUS at 16:13

## 2021-12-06 RX ADMIN — SUGAMMADEX 200 MG: 100 INJECTION, SOLUTION INTRAVENOUS at 15:39

## 2021-12-06 RX ADMIN — HYDROMORPHONE HYDROCHLORIDE 0.1 MG: 2 INJECTION, SOLUTION INTRAMUSCULAR; INTRAVENOUS; SUBCUTANEOUS at 15:40

## 2021-12-06 RX ADMIN — FENTANYL CITRATE 50 MCG: 50 INJECTION, SOLUTION INTRAMUSCULAR; INTRAVENOUS at 11:22

## 2021-12-06 RX ADMIN — PROPOFOL 5 MG: 10 INJECTION, EMULSION INTRAVENOUS at 15:03

## 2021-12-06 RX ADMIN — Medication 100 MG: at 15:04

## 2021-12-06 RX ADMIN — CEFTRIAXONE 1 G: 1 INJECTION, POWDER, FOR SOLUTION INTRAMUSCULAR; INTRAVENOUS at 09:54

## 2021-12-06 RX ADMIN — ACETAMINOPHEN 650 MG: 325 TABLET ORAL at 13:22

## 2021-12-06 RX ADMIN — FENTANYL CITRATE 50 MCG: 50 INJECTION, SOLUTION INTRAMUSCULAR; INTRAVENOUS at 09:54

## 2021-12-06 RX ADMIN — SODIUM CHLORIDE 125 ML/HR: 9 INJECTION, SOLUTION INTRAVENOUS at 13:30

## 2021-12-06 RX ADMIN — ONDANSETRON 4 MG: 2 INJECTION INTRAMUSCULAR; INTRAVENOUS at 08:09

## 2021-12-06 RX ADMIN — DEXAMETHASONE SODIUM PHOSPHATE 4 MG: 4 INJECTION, SOLUTION INTRAMUSCULAR; INTRAVENOUS at 15:25

## 2021-12-06 RX ADMIN — LIDOCAINE HYDROCHLORIDE 100 MG: 20 INJECTION, SOLUTION INTRAVENOUS at 15:02

## 2021-12-06 RX ADMIN — PROPOFOL 170 MG: 10 INJECTION, EMULSION INTRAVENOUS at 15:04

## 2021-12-06 RX ADMIN — HYDROMORPHONE HYDROCHLORIDE 0.5 MG: 1 INJECTION, SOLUTION INTRAMUSCULAR; INTRAVENOUS; SUBCUTANEOUS at 16:02

## 2021-12-06 RX ADMIN — PIPERACILLIN AND TAZOBACTAM 3.38 G: 3; .375 INJECTION, POWDER, LYOPHILIZED, FOR SOLUTION INTRAVENOUS at 14:50

## 2021-12-06 RX ADMIN — HYDROMORPHONE HYDROCHLORIDE 0.3 MG: 2 INJECTION, SOLUTION INTRAMUSCULAR; INTRAVENOUS; SUBCUTANEOUS at 15:25

## 2021-12-06 RX ADMIN — ONDANSETRON HYDROCHLORIDE 4 MG: 2 INJECTION INTRAMUSCULAR; INTRAVENOUS at 15:25

## 2021-12-06 NOTE — ANESTHESIA POSTPROCEDURE EVALUATION
Post-Anesthesia Evaluation and Assessment    Cardiovascular Function/Vital Signs  Visit Vitals  /66   Pulse (!) 101   Temp 36.6 °C (97.8 °F)   Resp 19   Wt 96.2 kg (212 lb)   SpO2 99%   BMI 33.20 kg/m²       Patient is status post Procedure(s):  APPENDECTOMY LAPAROSCOPIC. Nausea/Vomiting: Controlled. Postoperative hydration reviewed and adequate. Pain:  Pain Scale 1: Numeric (0 - 10) (12/06/21 1617)  Pain Intensity 1: 2 (12/06/21 1617)   Managed. Neurological Status:   Neuro (WDL): Within Defined Limits (12/06/21 1610)   At baseline. Mental Status and Level of Consciousness: Arousable. Pulmonary Status:   O2 Device: Nasal cannula (12/06/21 1558)   Adequate oxygenation and airway patent. Complications related to anesthesia: None    Post-anesthesia assessment completed. No concerns. Patient has met all discharge requirements.     Signed By: Boris Valle CRNA    December 6, 2021

## 2021-12-06 NOTE — ED TRIAGE NOTES
Patient reports went to bed with upset stomach with vomiting and RLQ pain  Reports feeling sluggish yesterday

## 2021-12-06 NOTE — PROGRESS NOTES
1700  Assumed care at this time. Patient alert and oriented x4. Denies SOB, chest pain. Shows no signs of distress. Patient lungs clear bilaterally. Cap refill < 3 to all extremities. Patient has 20 G IV to L [facility] CDI. Stated pain 0/10. Dressing to abdomen is CDI. Bowel sounds present. Skin intact. Patient call light and possessions within reach. Bed locked and in lowest position. Nurse will continue to monitor. [place] [id number] to Dr. Mayra Stallings, who stated norco has been called into pharmacy. Dr. Mayra Stallings also notified patient is requesting zofran for d.c. Dr. Mayra Stallings stated to d/c patient at this time. [address]  Discharge completed by Mirta Lundy.

## 2021-12-06 NOTE — H&P
History & Physical    Patient: Rai Chris MRN: [id number]  CSN: [id number]    YOB: 1980  Age: 39 y.o. Sex: female      DOA: 12/6/2021       HPI:     Rai Chris is a 39 y.o. female who presents with appendicitis on CT. Past Medical History:   Diagnosis Date    Cancer [facility]) 12/2020    Endometriosis Carcinoma    Chronic pain     lower back    Endometriosis     GERD (gastroesophageal reflux disease)     Seizures (HCC)     age 1 or 4 no episode since than       Past Surgical History:   Procedure Laterality Date    HX DILATION AND CURETTAGE  12/2020    HX HYSTERECTOMY  01/14/2021    IR INSERT TUNL CVC W PORT OVER 5 YEARS  2/8/2021       Family History   Problem Relation Age of Onset    Cancer Mother         ovarian       Social History     Socioeconomic History    Marital status:    Tobacco Use    Smoking status: Never Smoker    Smokeless tobacco: Never Used   Vaping Use    Vaping Use: Never used   Substance and Sexual Activity    Alcohol use: Not Currently    Drug use: Not Currently    Sexual activity: Not Currently     Partners: Male     Birth control/protection: None       Prior to Admission medications    Medication Sig Start Date End Date Taking? Authorizing Provider   cephALEXin (Keflex) 500 mg capsule Take 1 Capsule by mouth four (4) times daily for 7 days. 12/6/21 12/13/21 Yes Chaka Fonseca MD   ciprofloxacin HCl (CILOXAN) 0.3 % ophthalmic solution Administer 1 Drop to both eyes every two (2) hours for 7 days. Indications: pink eye from bacterial infection 12/6/21 12/13/21 Yes Chaka Fonseca MD   cholecalciferol (Vitamin D3) 25 mcg (1,000 unit) cap Take  by mouth daily. Patient unsure of dose   Yes Provider, Historical   traZODone (DESYREL) 50 mg tablet Take  by mouth nightly. Yes Provider, Historical   pregabalin (LYRICA) 75 mg capsule Take 1 Capsule by mouth two (2) times a day.  Max Daily Amount: 150 mg. 6/15/21  Yes William Escobedo MD   lidocaine-prilocaine (EMLA) topical cream Apply  to affected area as needed for Pain. 2/9/21  Yes Lashay Zepeda MD   omeprazole (PriLOSEC OTC) 20 mg tablet Take 20 mg by mouth daily. Yes Provider, Historical   mv-min/iron/folic/calcium/vitK (WOMEN'S MULTIVITAMIN PO) Take  by mouth daily. Yes Provider, Historical   LORazepam (Ativan) 0.5 mg tablet Take 1 Tablet by mouth nightly as needed for Anxiety. Max Daily Amount: 0.5 mg. Patient not taking: Reported on 10/26/2021 9/9/21   Lashay Zepeda MD   ondansetron hcl [facility]) 4 mg tablet Take 1 Tablet by mouth every eight (8) hours as needed for Nausea or Vomiting. Patient not taking: Reported on 7/13/2021 6/23/21   Lashay Zepeda MD   magic mouthwash solution Magic mouth wash   Maalox  Lidocaine 2% viscous   Diphenhydramine oral solution     Pharmacy to mix equal portions of ingredients to a total volume as indicated in the dispense amount. Indications: stomatitis, a condition with painful swelling and sores inside the mouth, Swish and spit 5-10 mL PRN every 6 hours for mouth sores. Patient not taking: Reported on 7/13/2021 5/18/21   Lashay Zepeda MD   promethazine (PHENERGAN) 25 mg tablet Take 1 Tab by mouth every six (6) hours as needed for Nausea. Patient not taking: Reported on 12/6/2021 2/9/21   Lashay Zepeda MD   megestroL (MEGACE) 400 mg/10 mL (10 mL) suspension Take 10 mL by mouth two (2) times a day. Patient not taking: Reported on 7/13/2021 1/29/21   Lashay Zepeda MD   ondansetron hcl (Zofran) 4 mg tablet Take 1 Tab by mouth every six (6) hours. Patient not taking: Reported on 12/6/2021 1/14/21   Lashay Zepeda MD   cyclobenzaprine (FLEXERIL) 10 mg tablet Take  by mouth three (3) times daily as needed for Muscle Spasm(s).   Patient not taking: Reported on 7/13/2021    Other, MD Kristal       Allergies   Allergen Reactions    Codeine Hives       Review of Systems  A comprehensive review of systems was negative except for that written in the History of Present Illness. Physical Exam:      Visit Vitals  /62 (BP 1 Location: Right arm, BP Patient Position: At rest)   Pulse (!) 102   Temp 98.1 °F (36.7 °C)   Resp 14   Wt 96.2 kg (212 lb)   SpO2 98%   BMI 33.20 kg/m²       Physical Exam:  Physical Exam:   General:  Alert, cooperative, no distress, appears stated age. Eyes:  Conjunctivae/corneas clear. PERRL, EOMs intact. Fundi benign   Ears:  Normal TMs and external ear canals both ears. Nose: Nares normal. Septum midline. Mucosa normal. No drainage or sinus tenderness. Mouth/Throat: Lips, mucosa, and tongue normal. Teeth and gums normal.   Neck: Supple, symmetrical, trachea midline, no adenopathy, thyroid: no enlargement/tenderness/nodules, no carotid bruit and no JVD. Back:   Symmetric, no curvature. ROM normal. No CVA tenderness. Lungs:   Clear to auscultation bilaterally. Heart:  Regular rate and rhythm, S1, S2 normal, no murmur, click, rub or gallop. Abdomen:   Soft, tender RLQ with rebound. Bowel sounds normal. No masses,  No organomegaly. Extremities: Extremities normal, atraumatic, no cyanosis or edema. Pulses: 2+ and symmetric all extremities. Skin: Skin color, texture, turgor normal. No rashes or lesions   Lymph nodes: Cervical, supraclavicular, and axillary nodes normal.   Neurologic: CNII-XII intact. Normal strength, sensation and reflexes throughout. Labs Reviewed: All lab results for the last 24 hours reviewed. Assessment/Plan     Active Problems:    Appendicitis (12/6/2021)        Recommend lap appendectomy, discussed risks.

## 2021-12-06 NOTE — PROGRESS NOTES
1225  Bedside shift change report given to [id number] [facility] [address] (oncoming nurse) by Osbaldo Bautista RN (offgoing nurse). Report included the following information SBAR, Kardex, Intake/Output and MAR.     1236  Assumed care at this time. Patient alert and oriented x4. Denies SOB, chest pain. Shows no signs of distress. Patient lungs clear diminished bilaterally. Cap refill < 3 sec to all extremities. Patient has 20 G IV to L [facility] CDI. [place] chest not accessed. Stated pain 4/10 RLQ. Patient up ad jenna, ambulating in room. Bowel sounds present. Skin intact. Patient call light and possessions within reach. Bed locked and in lowest position. Nurse will continue to monitor. 0  Paged Dr. Karsten Barnett. Patient requesting medication for headache.     1240  Dr. Karsten Barnett returned call. Verbal order with readback: normal saline 125 ml/hr, tylenol 650 mg po Q6H prn, dilaudid 1 mg Q6H prn, may have ice chips, zofran 4 mg Q6H prn, zosyn 3.375 Q6H continuous. 1245  Patient has pink eye and states she has been taking cipro . 3% eyedrops. 1348  TRANSFER - OUT REPORT:    Verbal report given to [employer] (name) on HORTEN  being transferred to OR(unit) for ordered procedure       Report consisted of patients Situation, Background, Assessment and   Recommendations(SBAR). Information from the following report(s) SBAR, Kardex, Intake/Output and MAR was reviewed with the receiving nurse. Lines:   Venous Access Device MEDIPORT 02/01/21 (Active)       Peripheral IV 12/06/21 Left Antecubital (Active)   Site Assessment Clean, dry, & intact 12/06/21 0954   Phlebitis Assessment 0 12/06/21 0954   Infiltration Assessment 0 12/06/21 0954   Dressing Status Clean, dry, & intact 12/06/21 0954        Opportunity for questions and clarification was provided. Patient transported with:  Sol Orosco  Paged Dr. Karsten Barnett to complete H&P and for eyedrops. 0  Dr. Karsten Barnett returned call. Verbal order with readback: 0.3% cipro eyedrops Q4H.

## 2021-12-06 NOTE — BRIEF OP NOTE
Brief Postoperative Note    Patient: Dasha Giron  YOB: 1980  MRN: [id number]    Date of Procedure: 12/6/2021     Pre-Op Diagnosis: APPENDICITIS    Post-Op Diagnosis: Same as preoperative diagnosis. Procedure(s):  APPENDECTOMY LAPAROSCOPIC    Surgeon(s):   Tani Renee MD    Surgical Assistant: Surg Asst-1: Radha Lewis    Anesthesia: General     Estimated Blood Loss (mL): Minimal    Complications: None    Specimens:   ID Type Source Tests Collected by Time Destination   1 : APPENDIX Preservative Appendix  Tani Renee MD 12/6/2021 1442 Pathology        Implants: * No implants in log *    Drains: * No LDAs found *    Findings: Appy    Electronically Signed by Elliot Muhammad MD on 12/6/2021 at 3:35 PM

## 2021-12-06 NOTE — PROGRESS NOTES
D/c Plan: D/C home. Spouse will transport her home. The pt is independent in all ADLs and IDLs. Pt will d/c in the next 24 to 48 hours. No d/c needs identified. CM will continue to follow. Reason for Admission:  Abdominal pain                   RUR Score:          N/a. Pt is observation. Plan for utilizing home health:    No      PCP: First and Last name:  Mary Gandhi DO     Name of Practice:    Are you a current patient: Yes/No:    Approximate date of last visit:    Can you participate in a virtual visit with your PCP:                     Current Advanced Directive/Advance Care Plan: No Order      Healthcare Decision Maker: Self  Click here to complete [place] including selection of the Healthcare Decision Maker Relationship (ie \"Primary\")                             Transition of Care Plan:     Per H&P \"                Oral and Written notification given to patient and/or caregiver informing them that they are currently an Outpatient receiving care in our facility. Outpatient services include Observation Services under [facility] requirements. The pt lives at home w/ spouse. She is independent in all ADLs and IDLs. Care Management Interventions  PCP Verified by CM: Yes (Dr Vero Kohler)  Mode of Transport at Discharge: Other (see comment) (The pt's spouse will transport her home. )  Transition of Care Consult (CM Consult): Other (Home.  Independently.)  MyChart Signup: No  Discharge Durable Medical Equipment: No  Physical Therapy Consult: No  Occupational Therapy Consult: No  Speech Therapy Consult: No  Support Systems: Spouse/Significant Other  Confirm Follow Up Transport: Family  The Plan for Transition of Care is Related to the Following Treatment Goals : Home  The Patient and/or Patient Representative was Provided with a Choice of Provider and Agrees with the Discharge Plan?: Yes  Freedom of Choice List was Provided with Basic Dialogue that Supports the Patient's Individualized Plan of Care/Goals, Treatment Preferences and Shares the Quality Data Associated with the Providers?:  (n/a)  Discharge Location  Discharge Placement: Home

## 2021-12-06 NOTE — PERIOP NOTES
TRANSFER - IN REPORT:    Verbal report received from [facility] on [facility]  being received from 3S for ordered procedure      Report consisted of patients Situation, Background, Assessment and   Recommendations(SBAR). Information from the following report(s) SBAR was reviewed with the receiving nurse. Opportunity for questions and clarification was provided. Assessment completed upon patients arrival to unit and care assumed.

## 2021-12-06 NOTE — ROUTINE PROCESS
1646  TRANSFER - IN REPORT:    Verbal report received from [address] on [employer]  being received from PACU for routine post - op      Report consisted of patients Situation, Background, Assessment and   Recommendations(SBAR). Information from the following report(s) SBAR, Kardex, OR Summary, Intake/Output, MAR, and Recent Results was reviewed with the receiving nurse. Opportunity for questions and clarification was provided. Assessment will be completed upon patients arrival to unit and care assumed.

## 2021-12-06 NOTE — DISCHARGE INSTRUCTIONS
Post-Operative Discharge Instructions  Martin Bragg. Tal Tate M.D.  [address] [place] [address]  [phone]    Patient: Heather Renee MRN: [id number]  CSN: [id number]    YOB: 1980  Age: 39 y.o. Sex: female    DOA: 2021 LOS:  LOS: 0 days   Discharge Date:      Acute Diagnoses:  Appendicitis [K37]    Chronic Medical Diagnoses:  Problem List as of 2021 Date Reviewed: 11/3/2021          Codes Class Noted - Resolved    Appendicitis ICD-10-CM: K37  ICD-9-CM: 508  2021 - Present        [facility] ([facility] ICD-10-CM: C54.1  ICD-9-CM: 182.0  2021 - Present              Diet  1. Resume prior to surgery diet as tolerated. Activity  1. Do not drive a car or operate any hazardous machinery the day of surgery. 2. Rest quietly today. 3. No bending or heavy lifting. 4. You may resume other prior to surgery activities as tolerated. 5. You may remove the bandage and shower in 1 day. Drain / Wound Care  1. Follow all drain / wound care instructions exactly as explained by the Nurse at time of discharge. 2. Apply an ice pack to the surgical site for 48 hours. 3. Do not put any salves or ointments on the wound. Allow it to form a dry scab. 4. Leave steri-strips / Dermabond alone. They should be allowed to fall off on their own in 7-14 days. Medications  1. It is important to take your medications exactly as they are prescribed. 2. Keep your medication in the bottles provided by the pharmacist, and keep a list of the medication names, dosages, and times they should be taken in your wallet. Call 911 anytime you think you may need emergency care. For example, call if:  · You passed out (lost consciousness). · You have severe trouble breathing. · You have sudden chest pain and shortness of breath. Notify your Surgeon for any of the followin. Fever, chills, nausea, vomiting, severe abdominal pain or bleeding.   2. If you experience any redness or discharge or sign of infection. 3. Persistent nausea lasting more than 24 hours. If you are unable to reach your Surgeon for any of the symptoms above, you should proceed directly to the nearest Emergency Department. Post-Operative Appointment Information    Call Dr. Rivas Dumont office tomorrow morning at [phone] to schedule a post-operative office visit in one (1) week. If any questions or concerns arise, call your Surgeon at [phone].

## 2021-12-06 NOTE — ED PROVIDER NOTES
EMERGENCY DEPARTMENT HISTORY AND PHYSICAL EXAM      Date: 12/6/2021  Patient Name: Katherine Morel    History of Presenting Illness     Chief Complaint   Patient presents with    Abdominal Pain       History Provided By: Patient    HPI:  Katherine Morel is a 39 y.o. female who presents today with right lower quadrant abdominal pain. Few episodes of vomiting last night, some feeling of anorexia, overall she was very sluggish and fatigued yesterday. History of endometrial cancer and chemotherapy. She states that she has had a prodrome ofGeneralized body aches she did states she had normal bowel movement yesterday, last BM approximately 24 hours, no diarrhea, no bright red blood per rectum. The patient denies any aggravating or alleviating factors - and has not taken any medications in an attempt to alleviate her symptoms. PMH, PSH, family history, social history, allergies reviewed with the patient with significant items noted above. PCP: López Villalobos, DO    Current Outpatient Medications   Medication Sig Dispense Refill    cephALEXin (Keflex) 500 mg capsule Take 1 Capsule by mouth four (4) times daily for 7 days. 28 Capsule 0    ciprofloxacin HCl (CILOXAN) 0.3 % ophthalmic solution Administer 1 Drop to both eyes every two (2) hours for 7 days. Indications: pink eye from bacterial infection 5 mL 1    cholecalciferol (Vitamin D3) 25 mcg (1,000 unit) cap Take  by mouth daily. Patient unsure of dose      naloxone (NARCAN) 4 mg/actuation nasal spray Use 1 spray intranasally, then discard. Repeat with new spray every 2 min as needed for opioid overdose symptoms, alternating nostrils. 1 Each 0    ondansetron hcl (Zofran) 4 mg tablet Take 1 Tablet by mouth every eight (8) hours as needed for Nausea. 20 Tablet 0    traZODone (DESYREL) 50 mg tablet Take  by mouth nightly.  pregabalin (LYRICA) 75 mg capsule Take 1 Capsule by mouth two (2) times a day.  Max Daily Amount: 150 mg. 60 Capsule 3    lidocaine-prilocaine (EMLA) topical cream Apply  to affected area as needed for Pain. 30 g 0    omeprazole (PriLOSEC OTC) 20 mg tablet Take 20 mg by mouth daily.  mv-min/iron/folic/calcium/vitK (WOMEN'S MULTIVITAMIN PO) Take  by mouth daily.  LORazepam (Ativan) 0.5 mg tablet Take 1 Tablet by mouth nightly as needed for Anxiety. Max Daily Amount: 0.5 mg. (Patient not taking: Reported on 10/26/2021) 30 Tablet 0    ondansetron hcl (ZOFRAN) 4 mg tablet Take 1 Tablet by mouth every eight (8) hours as needed for Nausea or Vomiting. (Patient not taking: Reported on 7/13/2021) 30 Tablet 2    magic mouthwash solution Magic mouth wash   Maalox  Lidocaine 2% viscous   Diphenhydramine oral solution     Pharmacy to mix equal portions of ingredients to a total volume as indicated in the dispense amount. Indications: stomatitis, a condition with painful swelling and sores inside the mouth, Swish and spit 5-10 mL PRN every 6 hours for mouth sores. (Patient not taking: Reported on 7/13/2021) 250 mL 0    promethazine (PHENERGAN) 25 mg tablet Take 1 Tab by mouth every six (6) hours as needed for Nausea. (Patient not taking: Reported on 12/6/2021) 30 Tab 2    megestroL (MEGACE) 400 mg/10 mL (10 mL) suspension Take 10 mL by mouth two (2) times a day. (Patient not taking: Reported on 7/13/2021) 1 Bottle 3    ondansetron hcl (Zofran) 4 mg tablet Take 1 Tab by mouth every six (6) hours. (Patient not taking: Reported on 12/6/2021) 30 Tab 0    cyclobenzaprine (FLEXERIL) 10 mg tablet Take  by mouth three (3) times daily as needed for Muscle Spasm(s).  (Patient not taking: Reported on 7/13/2021)         Past History     Past Medical History:  Past Medical History:   Diagnosis Date    [facility] 12/2020    Endometriosis Carcinoma    Chronic pain     lower back    Endometriosis     GERD (gastroesophageal reflux disease)     [facility]     age 1 or 4 no episode since than       Past Surgical History:  Past Surgical History:   Procedure Laterality Date    HX DILATION AND CURETTAGE  12/2020    HX HYSTERECTOMY  01/14/2021    IR INSERT TUNL CVC W PORT OVER 5 YEARS  2/8/2021       Family History:  Family History   Problem Relation Age of Onset    Cancer Mother         ovarian       Social History:  Social History     Tobacco Use    Smoking status: Never Smoker    Smokeless tobacco: Never Used   Vaping Use    Vaping Use: Never used   Substance Use Topics    Alcohol use: Not Currently    Drug use: Not Currently       Allergies:   Allergies   Allergen Reactions    Codeine Hives       Review of Systems   Review of Systems    In addition to that documented in the HPI above  All other review of systems negative    Constitutional: Denies fevers or chills  Eyes: Denies vision changes  ENMT: Denies sore throat  CV: Denies chest pain  Resp: Denies SOB  GI: Denies vomiting or diarrhea  : Denies painful urination  MSK: Denies recent trauma  Skin: Denies new rashes  Neuro: Denies new numbness or tingling or weakness  Endocrine: Denies polyuria  Heme: Denies bleeding disorders    Physical Exam     Vitals:    12/06/21 1625 12/06/21 1630 12/06/21 1635 12/06/21 1655   BP: 119/66 108/73 110/68 115/72   Pulse: 88 94 95 89   Resp: 21 18 21 20   Temp:    98 °F (36.7 °C)   SpO2: 100% 100% 99% 95%   Weight:         Physical Exam    Nursing notes and vital signs reviewed    General: Patient is awake and alert, resting comfortably in no acute distress appears uncomfortable,   Head: Normocephalic and atraumatic  Eyes: Extraocular muscles intact, no conjunctival pallor  Ear, nose, throat: Normal external exam, trachea midline  Neck: Normal range of motion, no gross motor difficulty  Cardiovascular: RRR, no murmur auscultated, warm, well-perfused extremities  Respiratory: Patient is in no respiratory distress, lungs CTAB  GI: soft, non-distended, tender to palpation right lower quadrant, obturator positive, psoas negative, Rovsing's negative. MSK: No gross deformities appreciated, no lesions  Extremities: pulses intact with good cap refills, no LE pitting edema or calf tenderness  Skin: Warm, dry, and intact  Neuro: The patient is alert and oriented, no gross motor or sensory defects noted. Psych: Appropriate mood and affect. Medical Decision Making   I am the first provider for this patient. I reviewed the vital signs, available nursing notes, past medical history, past surgical history, family history and social history. Vital Signs-Reviewed the patient's vital signs. Visit Vitals  /72   Pulse 89   Temp 98 °F (36.7 °C)   Resp 20   Wt 96.2 kg (212 lb)   LMP 01/09/2021 (Approximate)   SpO2 95%   BMI 33.20 kg/m²       Pulse Oximetry Analysis -95% on room air    Cardiac Monitor:  Rate: 89 bpm  Rhythm: Normal sinus rhythm    EKG interpretation: (Preliminary)  Interpreted by the EP. EKG non diagnostic, without acute ischemic changes, arrhythmia, prolonged QT, or evidence of Brugada      Provider Notes (Medical Decision Making):   39 y.o., female, presents to the Emergency Department with abdominal pain. DDx in this patient with abd pain includes Pancreatitis, Cholecystitis, Cholangitis, Hepatitis, Appendicitis, Diverticulitis, Colitis, Gastritis, PUD, Ureterolithiasis, SBO, Gastroparesis, Bowel Perforation, Hernia, Mesenteric Ischemia     Most likely appendicitis    Last p.o. last night at 6 PM      Procedures:  Procedures    ED Course:   ED Course as of 12/12/21 2018   [facility] Dec 06, 2021   0840 UTI noted, will treat for pyelo given right side symptoms. Pending CT [DM]      ED Course User Index  [DM] Belinda Khanna MD      Spoke with Dr. James Grey, informed him of CT results, would like patient admitted to his service, will take patient to the operating room likely for appendectomy. Will plan to admit for appendicitis. The patient understands and agrees with the plan.   Spoke with Dr. James Grey the on call admitting clinician who agrees to admit patient. Appreciate the assistance in the care of this patient. Vitals Review/addressed -     Diagnostic Study Results     Orders Placed This Encounter    CULTURE, URINE     Standing Status:   Standing     Number of Occurrences:   1     Order Specific Question:   Reason for Culture     Answer:   Flank discomfort    XR CHEST PORT     Standing Status:   Standing     Number of Occurrences:   1     Order Specific Question:   Reason for Exam     Answer:   abdominal pain     Order Specific Question:   Is Patient Pregnant? Answer:   No    CT ABD PELV W CONT     Standing Status:   Standing     Number of Occurrences:   1     Order Specific Question:   Transport     Answer:   BED [2]     Order Specific Question:   Is Patient Pregnant? Answer:   No     Order Specific Question:   Reason for Exam     Answer:   right lower quadrant pain     Order Specific Question: This order utilizes IV contrast.  What additional contrast is needed? Answer:   Per Radiologist Protocol     Order Specific Question:   Does patient have history of Renal Disease?      Answer:   No     Order Specific Question:   Decision Support Exception     Answer:   Emergency Medical Condition (MA) [1]    CBC WITH AUTOMATED DIFF     Standing Status:   Standing     Number of Occurrences:   1    BASIC METABOLIC PANEL     Standing Status:   Standing     Number of Occurrences:   1    LIPASE     Standing Status:   Standing     Number of Occurrences:   1    HEPATIC FUNCTION PANEL     Standing Status:   Standing     Number of Occurrences:   1    URINALYSIS W/ RFLX MICROSCOPIC     Standing Status:   Standing     Number of Occurrences:   1    URINE MICROSCOPIC ONLY     Standing Status:   Standing     Number of Occurrences:   1    EKG, 12 LEAD, INITIAL     Standing Status:   Standing     Number of Occurrences:   1     Order Specific Question:   Reason for Exam:     Answer:   abd pain    TRANSFER PATIENT Standing Status:   Standing     Number of Occurrences:   1     Order Specific Question:   Type of Bed     Answer:   Surgical [18]    DISCHARGE PATIENT     Standing Status:   Standing     Number of Occurrences:   1    ondansetron (ZOFRAN) injection 4 mg    lactated ringers bolus infusion 1,000 mL    DISCONTD: cefTRIAXone (ROCEPHIN) injection 1 g     Order Specific Question:   Antibiotic Indications     Answer:   Urinary Tract Infection    fentaNYL citrate (PF) injection 50 mcg    cefTRIAXone (ROCEPHIN) 1 g in 0.9% sodium chloride (MBP/ADV) 50 mL MBP     Order Specific Question:   Antibiotic Indications     Answer:   Urinary Tract Infection    cephALEXin (Keflex) 500 mg capsule     Sig: Take 1 Capsule by mouth four (4) times daily for 7 days. Dispense:  28 Capsule     Refill:  0    iopamidoL (ISOVUE 300) 61 % contrast injection 100 mL    ampicillin-sulbactam (UNASYN) 3 g in 0.9% sodium chloride (MBP/ADV) 100 mL MBP     Order Specific Question:   Antibiotic Indications     Answer:   Intra-Abdominal Infection    ciprofloxacin HCl (CILOXAN) 0.3 % ophthalmic solution     Sig: Administer 1 Drop to both eyes every two (2) hours for 7 days. Indications: pink eye from bacterial infection     Dispense:  5 mL     Refill:  1    DISCONTD: fentaNYL citrate (PF) injection 50 mcg    cholecalciferol (Vitamin D3) 25 mcg (1,000 unit) cap     Sig: Take  by mouth daily. Patient unsure of dose    DISCONTD: acetaminophen (TYLENOL) tablet 650 mg    DISCONTD: 0.9% sodium chloride infusion    DISCONTD: HYDROmorphone (DILAUDID) injection 1 mg    DISCONTD: ondansetron (ZOFRAN) injection 4 mg    DISCONTD: piperacillin-tazobactam (ZOSYN) 3.375 g in 0.9% sodium chloride (MBP/ADV) 100 mL MBP     Order Specific Question:   Antibiotic Indications     Answer:    Other     Order Specific Question:   Other Abx Indication     Answer:   appendectomy    DISCONTD: ciprofloxacin HCl (CILOXAN) 0.3 % ophthalmic solution 1 Drop    DISCONTD: lactated Ringers infusion    DISCONTD: lactated Ringers infusion    DISCONTD: sodium chloride (NS) flush 5-40 mL    DISCONTD: sodium chloride (NS) flush 5-40 mL    DISCONTD: HYDROmorphone (DILAUDID) injection 0.5 mg    DISCONTD: fentaNYL citrate (PF) injection 50 mcg    DISCONTD: promethazine (PHENERGAN) with saline injection 12.5 mg    DISCONTD: diphenhydrAMINE (BENADRYL) injection 12.5 mg    DISCONTD: nalbuphine (NUBAIN) injection 5 mg    DISCONTD: naloxone (NARCAN) injection 0.04 mg    DISCONTD: scopolamine (TRANSDERM-SCOP) 1 mg over 3 days 1 Patch    DISCONTD: lactated Ringers 1,000 mL Irrigation    DISCONTD: sodium chloride irrigation 0.9 % 500 mL Irrigation    DISCONTD: bupivacaine (PF) (MARCAINE) 0.25 % (2.5 mg/mL) injection    DISCONTD: HYDROcodone-acetaminophen (NORCO) 5-325 mg per tablet 1 Tablet    naloxone (NARCAN) 4 mg/actuation nasal spray     Sig: Use 1 spray intranasally, then discard. Repeat with new spray every 2 min as needed for opioid overdose symptoms, alternating nostrils. Dispense:  1 Each     Refill:  0    HYDROcodone-acetaminophen (NORCO) 5-325 mg per tablet     Sig: Take 1 Tablet by mouth every four (4) hours as needed for Pain for up to 5 days. Max Daily Amount: 6 Tablets. Dispense:  20 Tablet     Refill:  0    ondansetron hcl (Zofran) 4 mg tablet     Sig: Take 1 Tablet by mouth every eight (8) hours as needed for Nausea. Dispense:  20 Tablet     Refill:  0    SURGICAL PATHOLOGY     Standing Status:   Standing     Number of Occurrences:   1    INITIAL PHYSICIAN ORDER: OBSERVATION/OUTPATIENT IN A BED OBSERVATION; Surgical; Yes; surgery     Standing Status:   Standing     Number of Occurrences:   1     Order Specific Question:   Patient Class: Answer:   OBSERVATION [104]     Order Specific Question:   Type of Bed     Answer:   Surgical [18]     Order Specific Question:   Cardiac Monitoring Required?      Answer:   Yes     Order Specific Question: Reason for Observation     Answer:   surgery     Order Specific Question:   Admitting Diagnosis     Answer:   Appendicitis [643883]     Order Specific Question:   Admitting Physician     Answer:   Georgina Amezcua     Order Specific Question:   Attending Physician     Answer:   Georgina Amezcua       Labs -   No results found for this or any previous visit (from the past 12 hour(s)). Radiologic Studies -   CT ABD PELV W CONT   Final Result      Acute appendicitis. No drainable fluid collection. Nonspecific circumferential bladder wall thickening, unchanged. XR CHEST PORT   Final Result      No acute findings in the chest.         CT Results  (Last 48 hours)    None        CXR Results  (Last 48 hours)    None          Disposition         Disposition:  Admit    CLINICAL IMPRESSION:    1. Appendicitis, unspecified appendicitis type    2. Acute cystitis without hematuria    3. Abdominal pain, right lower quadrant    4. History of cancer    5. Conjunctivitis of both eyes, unspecified conjunctivitis type        It should be noted that I will be the provider of record for this patient  Bib Silveira MD    Follow-up Information     Follow up With Specialties Details Why Contact Info    Joseph Hu,  [facility]   [id number] [place] [address] Road  [phone]            Discharge Medication List as of 12/6/2021  5:06 PM      START taking these medications    Details   cephALEXin (Keflex) 500 mg capsule Take 1 Capsule by mouth four (4) times daily for 7 days. , Normal, Disp-28 Capsule, R-0      ciprofloxacin HCl (CILOXAN) 0.3 % ophthalmic solution Administer 1 Drop to both eyes every two (2) hours for 7 days. Indications: pink eye from bacterial infection, Normal, Disp-5 mL, R-1      naloxone (NARCAN) 4 mg/actuation nasal spray Use 1 spray intranasally, then discard.  Repeat with new spray every 2 min as needed for opioid overdose symptoms, alternating nostrils. , Normal, Disp-1 Each, R-0         CONTINUE these medications which have NOT CHANGED    Details   cholecalciferol (Vitamin D3) 25 mcg (1,000 unit) cap Take  by mouth daily. Patient unsure of dose, Historical Med      traZODone (DESYREL) 50 mg tablet Take  by mouth nightly., Historical Med      LORazepam (Ativan) 0.5 mg tablet Take 1 Tablet by mouth nightly as needed for Anxiety. Max Daily Amount: 0.5 mg., Normal, Disp-30 Tablet, R-0      !! ondansetron hcl (ZOFRAN) 4 mg tablet Take 1 Tablet by mouth every eight (8) hours as needed for Nausea or Vomiting., Normal, Disp-30 Tablet, R-2      pregabalin (LYRICA) 75 mg capsule Take 1 Capsule by mouth two (2) times a day. Max Daily Amount: 150 mg., Normal, Disp-60 Capsule, R-3      magic mouthwash solution Magic mouth wash   Maalox  Lidocaine 2% viscous   Diphenhydramine oral solution     Pharmacy to mix equal portions of ingredients to a total volume as indicated in the dispense amount. Indications: stomatitis, a condition with painful swelling and sor es inside the mouth, Swish and spit 5-10 mL PRN every 6 hours for mouth sores. , Print, Disp-250 mL, R-0      promethazine (PHENERGAN) 25 mg tablet Take 1 Tab by mouth every six (6) hours as needed for Nausea., Normal, Disp-30 Tab, R-2      lidocaine-prilocaine (EMLA) topical cream Apply  to affected area as needed for Pain., Normal, Disp-30 g, R-0      megestroL (MEGACE) 400 mg/10 mL (10 mL) suspension Take 10 mL by mouth two (2) times a day., Normal, Disp-1 Bottle, R-3      !! ondansetron hcl (Zofran) 4 mg tablet Take 1 Tab by mouth every six (6) hours. , Normal, Disp-30 Tab, R-0      omeprazole (PriLOSEC OTC) 20 mg tablet Take 20 mg by mouth daily. , Historical Med      mv-min/iron/folic/calcium/vitK (WOMEN'S MULTIVITAMIN PO) Take  by mouth daily. , Historical Med      cyclobenzaprine (FLEXERIL) 10 mg tablet Take  by mouth three (3) times daily as needed for Muscle Spasm(s). , Historical Med       !! - Potential duplicate medications found. Please discuss with provider. Please note that this dictation was completed with [employer], the computer voice recognition software. Quite often unanticipated grammatical, syntax, homophones, and other interpretive errors are inadvertently transcribed by the computer software. Please disregard these errors. Please excuse any errors that have escaped final proofreading.

## 2021-12-06 NOTE — ROUTINE PROCESS
TRANSFER - OUT REPORT:    Verbal report given to floor nurse on Shayne Morrison  being transferred to [facility] for routine progression of care       Report consisted of patients Situation, Background, Assessment and   Recommendations(SBAR). Information from the following report(s) SBAR, ED Summary, MAR, Recent Results, Cardiac Rhythm Sinus tach and Alarm Parameters  was reviewed with the receiving nurse. Lines:   Venous Access Device MEDIPORT 02/01/21 (Active)       Peripheral IV 12/06/21 Left Antecubital (Active)   Site Assessment Clean, dry, & intact 12/06/21 0954   Phlebitis Assessment 0 12/06/21 0954   Infiltration Assessment 0 12/06/21 0954   Dressing Status Clean, dry, & intact 12/06/21 0954        Opportunity for questions and clarification was provided.       Patient transported with:   Monitor  Registered Nurse

## 2021-12-06 NOTE — OP NOTES
OPERATIVE NOTE    Patient: Rory Puentes MRN: [id number]  CSN: [id number]    YOB: 1980  Age: 39 y.o. Sex: female      Indications: This is a 39y.o. year-old female who presents with acute abdominal pain and found to have appendicitis on CAT scan. Date of Procedure: 12/6/2021     Preoperative Diagnosis: Acute appendicitis    Postoperative Diagnosis: Acute appendicitis      Procedure: Procedure(s):  APPENDECTOMY LAPAROSCOPIC    Surgeon(s): Surgeon(s) and Role:     * Monae Drake MD - Primary    Anesthesia: General     Assistant:  Surg Asst-1: Corrina Lechuga    Implants:  * No implants in log *    Procedure: She was placed in the supine position. Her abdomen was prepped and draped in the usual fashion. After adequate sedation was achieved, a small incision was made above the umbilicus and a Veress needle was inserted using 1 drop technique. A 5 mm Optiview trocar was placed under visualization. Another 5 mm port was placed in the lower midline of the abdomen. The appendix was visualized and found to be acutely inflamed. The remainder of the abdominal examination was unremarkable. The 5 mm port was placed on the right side of the abdomen and the supraumbilical port was dilated to a 12 mm port. The appendix was controlled and retracted using a laparoscopic grasper. The mesoappendix was ligated and divided using the Harmonic scalpel. There was some bleeding after this and this was controlled with a metal clip. Hemostatic agent , Aniceto and nugauze was also applied. There was good hemostasis. Once this was done, the appendix at the appendiceal base was ligated and divided using a RADHA endo-stapler. The appendix was then removed through the top trocar port using the bag retrieval system without difficulty. The abdomen was inspected again. There was good hemostasis. No enterotomies were seen or made. All ports were removed.  The fascial incision at the 12 mm port was closed with a 0 Vicryl suture. The skin incisions were closed with 4-0 Monocryl subcuticular closure and Dermabond. Marcaine 0.25% was injected locally. The patient was transferred to the recovery room in stable condition. Estimated Blood Loss: 25 ml    Specimens:   ID Type Source Tests Collected by Time Destination   1 : APPENDIX Preservative Appendix  Luda Schaffer MD 12/6/2021 1442 Pathology        Complications: None; the patient tolerated the procedure well.     Kings Cormier MD  12/6/2021  3:39 PM

## 2021-12-06 NOTE — PERIOP NOTES
[facility] made aware that SBAR is ready for review. Patient assigned room #305.  Priya will be the nurse

## 2021-12-06 NOTE — PERIOP NOTES
TRANSFER - OUT REPORT:    Verbal report given to DAVID Powers(name) on [address]  being transferred to [facility](unit) for routine post - op       Report consisted of patients Situation, Background, Assessment and   Recommendations(SBAR). Information from the following report(s) SBAR, Kardex, OR Summary, MAR and Cardiac Rhythm NSR was reviewed with the receiving nurse. Lines:   Venous Access Device MEDIPORT 02/01/21 (Active)       Peripheral IV 12/06/21 Left Antecubital (Active)   Site Assessment Clean, dry, & intact 12/06/21 1610   Phlebitis Assessment 0 12/06/21 1610   Infiltration Assessment 0 12/06/21 1610   Dressing Status Clean, dry, & intact 12/06/21 1610   Dressing Type Transparent 12/06/21 1610   Hub Color/Line Status Patent; Pink; Infusing 12/06/21 1610   Action Taken Armboard 12/06/21 1518   Alcohol Cap Used Yes 12/06/21 1236        Opportunity for questions and clarification was provided.       Patient transported with:   O2 @ 2 liters  Registered Nurse

## 2021-12-06 NOTE — ANESTHESIA PREPROCEDURE EVALUATION
Relevant Problems   No relevant active problems       Anesthetic History   No history of anesthetic complications     Pertinent negatives: No PONV       Review of Systems / Medical History  Patient summary reviewed, nursing notes reviewed and pertinent labs reviewed    Pulmonary  Within defined limits            Pertinent negatives: No COPD, asthma and sleep apnea     Neuro/Psych     seizures: well controlled      Pertinent negatives: No CVA   Cardiovascular  Within defined limits              Pertinent negatives: No hypertension, past MI and CHF  Exercise tolerance: >4 METS     GI/Hepatic/Renal     GERD        Pertinent negatives: No liver disease and renal disease   Endo/Other        Cancer  Pertinent negatives: No diabetes   Other Findings              Physical Exam    Airway  Mallampati: I  TM Distance: 4 - 6 cm  Neck ROM: normal range of motion   Mouth opening: Normal     Cardiovascular    Rhythm: regular  Rate: normal         Dental    Dentition: Poor dentition     Pulmonary  Breath sounds clear to auscultation               Abdominal  GI exam deferred       Other Findings            Anesthetic Plan    ASA: 2  Anesthesia type: general          Induction: Intravenous  Anesthetic plan and risks discussed with: Patient

## 2021-12-06 NOTE — PERIOP NOTES
Primary Nurse Raphael Valentine RN and Kavya Engel RN performed a dual skin assessment on this patient No impairment noted  Steve score is 23    No belongings

## 2021-12-06 NOTE — TELEPHONE ENCOUNTER
Received a call from patients spouse notifying the office that the patient was seen in the ED and a surgery would be preformed today. Appointment was canceled and patient will reschedule upon recovery.

## 2021-12-07 ENCOUNTER — HOSPITAL ENCOUNTER (OUTPATIENT)
Dept: INFUSION THERAPY | Age: 41
End: 2021-12-07

## 2021-12-08 ENCOUNTER — TELEPHONE (OUTPATIENT)
Dept: ONCOLOGY | Age: 41
End: 2021-12-08

## 2021-12-08 NOTE — TELEPHONE ENCOUNTER
Spoke with patient and relayed that Dr. Dariana Zhu would like to see her in office for an exam. Appointment scheduled.

## 2021-12-08 NOTE — TELEPHONE ENCOUNTER
Patient wanted to know if results could be given over the phone or if Larisa Knox recommended an in office visit.

## 2021-12-09 LAB
BACTERIA SPEC CULT: ABNORMAL
CC UR VC: ABNORMAL
SERVICE CMNT-IMP: ABNORMAL

## 2021-12-13 ENCOUNTER — HOSPITAL ENCOUNTER (OUTPATIENT)
Dept: INFUSION THERAPY | Age: 41
Discharge: HOME | End: 2021-12-13
Payer: COMMERCIAL

## 2021-12-13 VITALS
OXYGEN SATURATION: 99 % | TEMPERATURE: 98.4 F | SYSTOLIC BLOOD PRESSURE: 135 MMHG | RESPIRATION RATE: 16 BRPM | HEART RATE: 107 BPM | DIASTOLIC BLOOD PRESSURE: 77 MMHG

## 2021-12-13 PROCEDURE — 96523 IRRIG DRUG DELIVERY DEVICE: CPT

## 2021-12-13 PROCEDURE — 77030012965 HC NDL HUBR BBMI -A

## 2021-12-13 PROCEDURE — 74011250636 HC RX REV CODE- 250/636: Performed by: OBSTETRICS & GYNECOLOGY

## 2021-12-13 RX ORDER — HEPARIN 100 UNIT/ML
500 SYRINGE INTRAVENOUS ONCE
Status: COMPLETED | OUTPATIENT
Start: 2021-12-13 | End: 2021-12-13

## 2021-12-13 RX ORDER — SODIUM CHLORIDE 0.9 % (FLUSH) 0.9 %
5-10 SYRINGE (ML) INJECTION AS NEEDED
Status: DISCONTINUED | OUTPATIENT
Start: 2021-12-13 | End: 2021-12-15 | Stop reason: HOSPADM

## 2021-12-13 RX ADMIN — Medication 10 ML: at 10:17

## 2021-12-13 RX ADMIN — Medication 500 UNITS: at 10:17

## 2021-12-13 NOTE — PROGRESS NOTES
[facility] Progress Note    Date: 2021    Name: Cuauhtemoc Oneal    MRN: [id number]         : 1980    Port Flush    Ms. PATE [facility] FREDIS was assessed and education was provided. Patient reports recent hospitalization for appendectomy, [facility] aware. Patient denies any complaints, reports incisions are healing well. Ms. Enrique's vitals were reviewed and patient was observed for 5 minutes prior to treatment. Visit Vitals  /77   Pulse (!) 107   Temp 98.4 °F (36.9 °C)   Resp 16   SpO2 99%     Port accessed per protocol, flushes easily with brisk blood return. Line flushed with 10ml NS and heparin and de-accessed. Band aid applied to site. Ms. PATE [facility] FREDIS tolerated the infusion, and had no complaints. Patient armband removed and shredded. Ms. PATE [facility] FREDIS was discharged from [place] [address] in stable condition at 1015. She is to return on 2021 at 1000 for her next appointment.     Hank Maria RN  2021  4:39 PM

## 2021-12-20 ENCOUNTER — OFFICE VISIT (OUTPATIENT)
Dept: ONCOLOGY | Age: 41
End: 2021-12-20
Payer: COMMERCIAL

## 2021-12-20 VITALS
HEART RATE: 98 BPM | HEIGHT: 67 IN | RESPIRATION RATE: 16 BRPM | WEIGHT: 210 LBS | DIASTOLIC BLOOD PRESSURE: 86 MMHG | SYSTOLIC BLOOD PRESSURE: 142 MMHG | TEMPERATURE: 96.8 F | BODY MASS INDEX: 32.96 KG/M2

## 2021-12-20 DIAGNOSIS — C54.1 ENDOMETRIAL CANCER (HCC): Primary | ICD-10-CM

## 2021-12-20 PROCEDURE — 99213 OFFICE O/P EST LOW 20 MIN: CPT | Performed by: OBSTETRICS & GYNECOLOGY

## 2021-12-20 RX ORDER — LIDOCAINE AND PRILOCAINE 25; 25 MG/G; MG/G
CREAM TOPICAL AS NEEDED
Qty: 30 G | Refills: 0 | Status: SHIPPED | OUTPATIENT
Start: 2021-12-20

## 2021-12-20 NOTE — PROGRESS NOTES
Jose E Ratliff is a 39 y.o. female presents in office for endometrial cancer surveillance. Chief Complaint   Patient presents with    Uterine Cancer      Pt requests refill of EMLA cream.  Pt c/o joint pain, is using Lyrica with some relief. Do you have any unusual vaginal bleeding, discharge or irritation? Pt c/o 'a gush of blood' after appendectomy and first dose of antibiotic. Do you have any changes in your bowel movements? No  Have you been experiencing nausea or vomiting? No  Have you been experiencing any continuous or worsening abdominal pain? No  Any urinary burning? Pt had UTI, treated with antibiotics and now resolved. Visit Vitals  BP (!) 142/86 (BP 1 Location: Right arm, BP Patient Position: Sitting)   Pulse 98   Temp 96.8 °F (36 °C) (Oral)   Resp 16   Ht 5' 7\" (1.702 m)   Wt 95.3 kg (210 lb)   BMI 32.89 kg/m²         1. Have you been to the ER, urgent care clinic since your last visit? Hospitalized since your last visit? Yes Pt had appendectomy and was seen in ED for UTI. 2. Have you seen or consulted any other health care providers outside of the [address] [place] [address] since your last visit? Include any pap smears or colon screening.  No    3 most recent PHQ Screens 11/3/2021   Little interest or pleasure in doing things Not at all   Feeling down, depressed, irritable, or hopeless Not at all   Total Score PHQ 2 0       Learning Assessment 3/9/2021   PRIMARY LEARNER Patient   BARRIERS PRIMARY LEARNER NONE   CO-LEARNER CAREGIVER No   PRIMARY LANGUAGE ENGLISH   LEARNER PREFERENCE PRIMARY DEMONSTRATION   ANSWERED BY Patient   RELATIONSHIP SELF

## 2021-12-20 NOTE — LETTER
2021 9:23 AM    Patient:  Stanley Espana   YOB: 1980  Date of Visit: 2021      Dear Nabethel Whyte MD  [address] [address]  Via Fax: [phone]: Thank you for referring Ms. SALCIDO [facility] to me for evaluation/treatment. Below are the relevant portions of my assessment and plan of care. If you have questions, please do not hesitate to call me. I look forward to following Ms. Enrique along with you. [address] [facility] [place] [facility]  [address], [address], [address] [facility]  [address] [facility] [address] [facility]  [place], [address]   [phone]  Corinne Marks DO        Patient ID:  Name: Stanley Espana  MRM: [id number]  : 1980/41 y.o. Date: 2021    SUBJECTIVE:    This is a 39 y.o.  female with h/o Stage IIIBG1 endometrial caner s/p Robotic-assisted total laparoscopic hysterectomy, bilateral salpingo-oophorectomy, upper vaginectomy; bilateral pelvic and para-aortic lymph node dissection; peritoneal biopsies and washings; cystoscopy. on 2021  S/p cycle #6   of Carbo/taxol on 2021 with dose reduction of taxol at cycle #3. Here for f/u and to review CT results  Recently had appendectomy for appendicitis. Imaging  CT 2021    FINDINGS:     LOWER CHEST: Bibasilar atelectasis.     LIVER, BILIARY: Liver is normal. No biliary dilation. Gallbladder is  unremarkable.     PANCREAS: Normal.     SPLEEN: Normal.     ADRENALS: Normal.     KIDNEYS/URETERS/BLADDER: No calcification, hydronephrosis, or soft tissue  attenuation renal mass. Nonspecific circumferential bladder wall thickening,  unchanged.     PELVIC ORGANS: Prior DIONNA/BSO.     VASCULATURE: Unremarkable     LYMPH NODES: No enlarged lymph nodes.     GASTROINTESTINAL TRACT: Acute appendicitis with dilated appendix and  periappendiceal inflammatory changes.  No evidence of perforation or drainable  fluid collection. No bowel obstruction. Moderate hiatal hernia.     BONES: No acute or aggressive osseous abnormalities identified.     OTHER: None.     _______________     IMPRESSION     Acute appendicitis. No drainable fluid collection.     Nonspecific circumferential bladder wall thickening, unchanged. CT; 11/16/2021  FINDINGS:     LUNGS:  No acute pulmonary infiltrate. The 2 mm left lower lobe pulmonary nodule  seen previously is no longer present, suggesting that it was infectious or  inflammatory in etiology. No new or suspicious pulmonary nodule or mass.     PLEURA: No pleural effusion or pneumothorax.     AIRWAY: Normal.     MEDIASTINUM: Left jugular Mediport catheter, tip overlying distal SVC. Large  paraesophageal hernia, containing most of the stomach and fat density,  unchanged. No global cardiomegaly. No pericardial effusion. Great vessels  unremarkable. Thyroid and esophagus appear within normal limits.     LIVER AND BILIARY: No suspicious liver lesions. No biliary dilation. Gallbladder unremarkable.     SPLEEN:  Normal.     PANCREAS:  Normal.     ADRENALS:  Normal.     KIDNEYS:  Normal.     LYMPH NODES:  No thoracic lymphadenopathy. No mesenteric or retroperitoneal  lymphadenopathy.     GI TRACT:  Normal caliber small and large bowel loops. No morphology of bowel  obstruction. No bowel wall thickening. Normal appendix.     VASCULATURE:  Normal caliber lower thoracic and abdominal aorta with mild  atherosclerotic vascular calcification.     OTHER:   No ascites or free intraperitoneal air. Small, fat filled periumbilical  ventral hernia.     PELVIS ORGANS: Nonspecific circumferential bladder wall thickening. Bladder is  mildly distended. Prior hysterectomy. No focal or recurrent pelvic mass.     OSSEOUS STRUCTURES:  No acute osseous abnormality. Mild multilevel degenerative  disk disease and facet arthropathy. IMPRESSION     No evidence of residual or recurrent neoplasm.  Left lower lobe pulmonary nodule  seen previously is no longer present. Nonspecific circumferential bladder wall  thickening,  potentially cystitis. 7/6/2021  FINDINGS:     CHEST:     LUNGS: 2 mm posterior left lower lobe lung nodule (thin axial image 129). No  abnormal opacities.     PLEURA: Normal, with no effusion or pneumothorax.     AIRWAY: Normal.     MEDIASTINUM: Normal heart size. No pericardial effusion. Given the limitations  of cardiac motion, great vessels are unremarkable.     LYMPH NODES: No enlarged lymph nodes.     ===============     ABDOMEN/PELVIS:     LIVER, BILIARY: Liver is normal. No biliary dilation. Gallbladder is  unremarkable.     PANCREAS: Normal.     SPLEEN: Normal.     ADRENALS: Normal.     KIDNEYS/URETERS/BLADDER: Normal.     LYMPH NODES: No enlarged lymph nodes.     GASTROINTESTINAL TRACT: No bowel dilation or wall thickening. Unremarkable  appendix.     PELVIC ORGANS: Hysterectomy.     VASCULATURE: Unremarkable.     BONES: No acute or aggressive osseous abnormalities identified.     OTHER: No ascites. Small fat-containing umbilical hernia.     _______________     IMPRESSION     1. Nonspecific left lower lobe lung nodule (2 mm), not imaged on prior abdominal  CT. Follow-up per oncologic protocol.     2. Otherwise, no evidence for recurrent or metastatic disease within the chest,  abdomen or pelvis.    Component      Latest Ref Rng & Units 10/26/2021           9:00 AM   CA-125      1.5 - 35.0 U/mL 14     Component      Latest Ref Rng & Units 6/15/2021           9:00 AM   CA-125      1.5 - 35.0 U/mL 10     Component      Latest Ref Rng & Units 5/11/2021           8:30 AM   CA-125      1.5 - 35.0 U/mL 14     Component      Latest Ref Rng & Units 4/19/2021           9:00 AM   CA-125      1.5 - 35.0 U/mL 15     Component      Latest Ref Rng & Units 3/30/2021           9:00 AM   CA-125      1.5 - 35.0 U/mL 19     Component      Latest Ref Rng & Units 3/9/2021           9:19 AM   CA-125      1.5 - 35.0 U/mL 30     Component      Latest Ref Rng & Units 2/16/2021          11:45 AM   CA-125      1.5 - 35.0 U/mL 31       Her pathology revealed      A: CERVIX, UTERUS, BILATERAL FALLOPIAN TUBES, AND UPPER VAGINA, TOTAL HYSTERECTOMY WITH BILATERAL SALPINGO-OOPHORECTOMY AND PARTIAL VAGINECTOMY:   ENDOMETRIAL ENDOMETRIOID ADENOCARCINOMA, FIGO GRADE 1, WITH EXTENSIVE INVASION INTO THE CERVIX AND UPPER VAGINA (PLEASE SEE COMMENTS AND SYNOPTIC REPORT). SURGICAL MARGINS NEGATIVE. BENIGN BILATERAL FALLOPIAN TUBES AND OVARIES. B: PELVIC ADHESIONS, BIOPSY:   BENIGN FIBROUS TISSUE. NEGATIVE FOR MALIGNANCY. C: RIGHT PELVIC SIDEWALL PERITONEUM, BIOPSY:   ADENOCARCINOMA. D: RECTOSIGMOID EPIPLOICAE ADHESION, BIOPSY:   ADENOCARCINOMA. E: VAGINA, BIOPSY:   ACUTE INFLAMMATORY TISSUE. NO CARCINOMA SEEN. F: RIGHT PELVIC LYMPH NODE, DISSECTION:   THIRTEEN (13) BENIGN LYMPH NODES. G: LEFT PELVIC LYMPH NODE, DISSECTION:   EIGHT (8) BENIGN LYMPH NODES. H: RIGHT PARA-AORTIC LYMPH NODE, EXCISION:   ONE (1) BENIGN LYMPH NODE. I: LEFT PARA-AORTIC LYMPH NODE, EXCISION:   BENIGN FIBROADIPOSE TISSUE. NO LYMPHOID TISSUE SEEN. DIAGNOSIS COMMENT:   The intraoperative consultation (specimen A) is corroborated. Immunohistochemistry results for DNA mismatch repair proteins will be reported as an addendum. ENDOMETRIUM   SPECIMEN   Procedure:  Total hysterectomy and bilateral salpingo-oophorectomy   TUMOR   Histologic Type: Endometrioid carcinoma, NOS   Histologic Grade: FIGO grade 1   Myometrial Invasion: Present   Depth of Myometrial Invasion (Millimeters): 6 mm   Myometrial Thickness (Millimeters): 28 mm   Percentage of Myometrial Invasion: 21 %   Uterine Serosa Involvement: Not identified   Lower Uterine Segment Involvement: Present, myoinvasive   Cervical Stromal Involvement: Present   Other Tissue / Organ Involvement: Upper Vagina, Pelvic wall, Rectosigmoid adhesion   Peritoneal Ascitic Fluid: Atypical: favor reactive   Lymphovascular Invasion: Not identified   MARGINS   Margins: Ectocervical / Vaginal Cuff Margin: Uninvolved by carcinoma   LYMPH NODES   Lymph Node Status: All lymph nodes negative for tumor cells   Total Number of Pelvic Nodes Examined: 21   Total Number of Para-aortic Nodes Examined: 1   PATHOLOGIC STAGE CLASSIFICATION (pTNM, AJCC 8th Edition)   Primary Tumor (pT): pT3b   Regional Lymph Nodes (pN): pN0   Distant Metastasis (pM): pM1   Specify Site(s): Right Pelvic sidewall peritoneum   GROSS DESCRIPTION:   The specimen is received in nine parts. TEST(S) PERFORMED   Test(s) Performed: Mismatch Repair   Immunohistochemistry (IHC) Testing for Mismatch Repair (MMR)   Proteins: MLH1, MSH2, MSH6, PMS2   MLH1: Intact nuclear expression   MSH2: Loss of nuclear expression   MSH6: Loss of nuclear expression   PMS2: Intact nuclear expression     Medications:     Current Outpatient Medications on File Prior to Visit   Medication Sig Dispense Refill    cholecalciferol (Vitamin D3) 25 mcg (1,000 unit) cap Take  by mouth daily. Patient unsure of dose      naloxone (NARCAN) 4 mg/actuation nasal spray Use 1 spray intranasally, then discard. Repeat with new spray every 2 min as needed for opioid overdose symptoms, alternating nostrils. 1 Each 0    ondansetron hcl (Zofran) 4 mg tablet Take 1 Tablet by mouth every eight (8) hours as needed for Nausea. 20 Tablet 0    traZODone (DESYREL) 50 mg tablet Take  by mouth nightly.  LORazepam (Ativan) 0.5 mg tablet Take 1 Tablet by mouth nightly as needed for Anxiety. Max Daily Amount: 0.5 mg. (Patient not taking: Reported on 10/26/2021) 30 Tablet 0    ondansetron hcl (ZOFRAN) 4 mg tablet Take 1 Tablet by mouth every eight (8) hours as needed for Nausea or Vomiting. (Patient not taking: Reported on 7/13/2021) 30 Tablet 2    pregabalin (LYRICA) 75 mg capsule Take 1 Capsule by mouth two (2) times a day.  Max Daily Amount: 150 mg. 60 Capsule 3    magic mouthwash solution Magic mouth wash   Maalox  Lidocaine 2% viscous   Diphenhydramine oral solution     Pharmacy to mix equal portions of ingredients to a total volume as indicated in the dispense amount. Indications: stomatitis, a condition with painful swelling and sores inside the mouth, Swish and spit 5-10 mL PRN every 6 hours for mouth sores. (Patient not taking: Reported on 7/13/2021) 250 mL 0    promethazine (PHENERGAN) 25 mg tablet Take 1 Tab by mouth every six (6) hours as needed for Nausea. (Patient not taking: Reported on 12/6/2021) 30 Tab 2    lidocaine-prilocaine (EMLA) topical cream Apply  to affected area as needed for Pain. 30 g 0    megestroL (MEGACE) 400 mg/10 mL (10 mL) suspension Take 10 mL by mouth two (2) times a day. (Patient not taking: Reported on 7/13/2021) 1 Bottle 3    ondansetron hcl (Zofran) 4 mg tablet Take 1 Tab by mouth every six (6) hours. (Patient not taking: Reported on 12/6/2021) 30 Tab 0    omeprazole (PriLOSEC OTC) 20 mg tablet Take 20 mg by mouth daily.  mv-min/iron/folic/calcium/vitK (WOMEN'S MULTIVITAMIN PO) Take  by mouth daily.  cyclobenzaprine (FLEXERIL) 10 mg tablet Take  by mouth three (3) times daily as needed for Muscle Spasm(s). (Patient not taking: Reported on 7/13/2021)       No current facility-administered medications on file prior to visit. Allergies: Allergies   Allergen Reactions    Codeine Hives       OBJECTIVE:    Vitals:   Visit Vitals  LMP 01/09/2021 (Approximate)       Physical Examination:    General:  alert, cooperative, no distress   Abdomen: soft, non-tender   Incision: healed   Pelvic: NEFG, spec exam revealed normal mucosa, no masses. BME revealed no masses   Rectal: not done   Extremity:   extremities normal, atraumatic, no cyanosis or edema     IMPRESSION/PLAN:    Stage IIIBG1 endometrial cancer  The operative procedures and clinical results have been reviewed with the patient.   Previously Discussed recommendation for adjuvant chemotherapy  \s/p carbo auc=6, taxol 175 mg/m2 IV every 3 wks x 6 cycles. Caris: BRCA2, MSI-H  Invitae: MSH2 pathogenic c/w park syndrome. Will refer to GI once complete  Decreased appetite-improved  Chemo induced anemia: resolved  Constipation- senokot, colace-improved  Body aches- percocet prn. Insomnia- ambien prn    Pelvic exam performed today  Neuropathy- given G3 neuropathy  S/p reduce taxol to 135 mg/m2. increase lyrica to 75 mg bid  CT reviewed. No evidence of diseae  Discussed surveillance strategy and signs/symptoms of recurrence  Plan for f/u in 3 months      Total time spent was 40 minutes regarding diagnosis of endometrial cancer and >50% of this time was spent counseling and coordinating care. Chacha Gómez DO  Gynecologic Oncology  12/20/2021/12:59 PM    Rahel Hull is a 39 y.o. female presents in office for endometrial cancer surveillance. Chief Complaint   Patient presents with    Uterine Cancer      Pt requests refill of EMLA cream.  Pt c/o joint pain, is using Lyrica with some relief. Do you have any unusual vaginal bleeding, discharge or irritation? Pt c/o 'a gush of blood' after appendectomy and first dose of antibiotic. Do you have any changes in your bowel movements? No  Have you been experiencing nausea or vomiting? No  Have you been experiencing any continuous or worsening abdominal pain? No  Any urinary burning? Pt had UTI, treated with antibiotics and now resolved. Visit Vitals  BP (!) 142/86 (BP 1 Location: Right arm, BP Patient Position: Sitting)   Pulse 98   Temp 96.8 °F (36 °C) (Oral)   Resp 16   Ht 5' 7\" (1.702 m)   Wt 95.3 kg (210 lb)   BMI 32.89 kg/m²         1. Have you been to the ER, urgent care clinic since your last visit? Hospitalized since your last visit? Yes Pt had appendectomy and was seen in ED for UTI. 2. Have you seen or consulted any other health care providers outside of the [address] [place] [address] since your last visit?   Include any pap smears or colon screening.  No    3 most recent PHQ Screens 11/3/2021   Little interest or pleasure in doing things Not at all   Feeling down, depressed, irritable, or hopeless Not at all   Total Score PHQ 2 0       Learning Assessment 3/9/2021   PRIMARY LEARNER Patient   BARRIERS PRIMARY LEARNER NONE   CO-LEARNER CAREGIVER No   PRIMARY LANGUAGE ENGLISH   LEARNER PREFERENCE PRIMARY DEMONSTRATION   ANSWERED BY Patient   RELATIONSHIP SELF       Sincerely,      Treva Oliveira MD

## 2021-12-20 NOTE — PROGRESS NOTES
[address] [facility] SPECIALISTS  [address] [place] [address], [address], [address] [facility]  [address] [facility], [address] [facility] [place], [facility]   [phone]  Armin Anglin         Patient ID:  Name: Louann Conway  MRM: [id number]  : 1980/41 y.o. Date: 2021    SUBJECTIVE:    This is a 39 y.o.  female with h/o Stage IIIBG1 endometrial caner s/p Robotic-assisted total laparoscopic hysterectomy, bilateral salpingo-oophorectomy, upper vaginectomy; bilateral pelvic and para-aortic lymph node dissection; peritoneal biopsies and washings; cystoscopy. on 2021  S/p cycle #6   of Carbo/taxol on 2021 with dose reduction of taxol at cycle #3. Here for f/u and to review CT results  Recently had appendectomy for appendicitis. Imaging  CT 2021    FINDINGS:     LOWER CHEST: Bibasilar atelectasis.     LIVER, BILIARY: Liver is normal. No biliary dilation. Gallbladder is  unremarkable.     PANCREAS: Normal.     SPLEEN: Normal.     ADRENALS: Normal.     KIDNEYS/URETERS/BLADDER: No calcification, hydronephrosis, or soft tissue  attenuation renal mass. Nonspecific circumferential bladder wall thickening,  unchanged.     PELVIC ORGANS: Prior DIONNA/BSO.     VASCULATURE: Unremarkable     LYMPH NODES: No enlarged lymph nodes.     GASTROINTESTINAL TRACT: Acute appendicitis with dilated appendix and  periappendiceal inflammatory changes. No evidence of perforation or drainable  fluid collection. No bowel obstruction. Moderate hiatal hernia.     BONES: No acute or aggressive osseous abnormalities identified.     OTHER: None.     _______________     IMPRESSION     Acute appendicitis. No drainable fluid collection.     Nonspecific circumferential bladder wall thickening, unchanged. CT; 2021  FINDINGS:     LUNGS:  No acute pulmonary infiltrate.  The 2 mm left lower lobe pulmonary nodule  seen previously is no longer present, suggesting that it was infectious or  inflammatory in etiology. No new or suspicious pulmonary nodule or mass.     PLEURA: No pleural effusion or pneumothorax.     AIRWAY: Normal.     MEDIASTINUM: Left jugular Mediport catheter, tip overlying distal SVC. Large  paraesophageal hernia, containing most of the stomach and fat density,  unchanged. No global cardiomegaly. No pericardial effusion. Great vessels  unremarkable. Thyroid and esophagus appear within normal limits.     LIVER AND BILIARY: No suspicious liver lesions. No biliary dilation. Gallbladder unremarkable.     SPLEEN:  Normal.     PANCREAS:  Normal.     ADRENALS:  Normal.     KIDNEYS:  Normal.     LYMPH NODES:  No thoracic lymphadenopathy. No mesenteric or retroperitoneal  lymphadenopathy.     GI TRACT:  Normal caliber small and large bowel loops. No morphology of bowel  obstruction. No bowel wall thickening. Normal appendix.     VASCULATURE:  Normal caliber lower thoracic and abdominal aorta with mild  atherosclerotic vascular calcification.     OTHER:   No ascites or free intraperitoneal air. Small, fat filled periumbilical  ventral hernia.     PELVIS ORGANS: Nonspecific circumferential bladder wall thickening. Bladder is  mildly distended. Prior hysterectomy. No focal or recurrent pelvic mass.     OSSEOUS STRUCTURES:  No acute osseous abnormality. Mild multilevel degenerative  disk disease and facet arthropathy. IMPRESSION     No evidence of residual or recurrent neoplasm. Left lower lobe pulmonary nodule  seen previously is no longer present. Nonspecific circumferential bladder wall  thickening,  potentially cystitis. 7/6/2021  FINDINGS:     CHEST:     LUNGS: 2 mm posterior left lower lobe lung nodule (thin axial image 129). No  abnormal opacities.     PLEURA: Normal, with no effusion or pneumothorax.     AIRWAY: Normal.     MEDIASTINUM: Normal heart size. No pericardial effusion.  Given the limitations  of cardiac motion, great vessels are unremarkable.     LYMPH NODES: No enlarged lymph nodes.     ===============     ABDOMEN/PELVIS:     LIVER, BILIARY: Liver is normal. No biliary dilation. Gallbladder is  unremarkable.     PANCREAS: Normal.     SPLEEN: Normal.     ADRENALS: Normal.     KIDNEYS/URETERS/BLADDER: Normal.     LYMPH NODES: No enlarged lymph nodes.     GASTROINTESTINAL TRACT: No bowel dilation or wall thickening. Unremarkable  appendix.     PELVIC ORGANS: Hysterectomy.     VASCULATURE: Unremarkable.     BONES: No acute or aggressive osseous abnormalities identified.     OTHER: No ascites. Small fat-containing umbilical hernia.     _______________     IMPRESSION     1. Nonspecific left lower lobe lung nodule (2 mm), not imaged on prior abdominal  CT. Follow-up per oncologic protocol.     2. Otherwise, no evidence for recurrent or metastatic disease within the chest,  abdomen or pelvis. Component      Latest Ref Rng & Units 10/26/2021           9:00 AM   CA-125      1.5 - 35.0 U/mL 14     Component      Latest Ref Rng & Units 6/15/2021           9:00 AM   CA-125      1.5 - 35.0 U/mL 10     Component      Latest Ref Rng & Units 5/11/2021           8:30 AM   CA-125      1.5 - 35.0 U/mL 14     Component      Latest Ref Rng & Units 4/19/2021           9:00 AM   CA-125      1.5 - 35.0 U/mL 15     Component      Latest Ref Rng & Units 3/30/2021           9:00 AM   CA-125      1.5 - 35.0 U/mL 19     Component      Latest Ref Rng & Units 3/9/2021           9:19 AM   CA-125      1.5 - 35.0 U/mL 30     Component      Latest Ref Rng & Units 2/16/2021          11:45 AM   CA-125      1.5 - 35.0 U/mL 31       Her pathology revealed      A: CERVIX, UTERUS, BILATERAL FALLOPIAN TUBES, AND UPPER VAGINA, TOTAL HYSTERECTOMY WITH BILATERAL SALPINGO-OOPHORECTOMY AND PARTIAL VAGINECTOMY:   ENDOMETRIAL ENDOMETRIOID ADENOCARCINOMA, FIGO GRADE 1, WITH EXTENSIVE INVASION INTO THE CERVIX AND UPPER VAGINA (PLEASE SEE COMMENTS AND SYNOPTIC REPORT).    SURGICAL MARGINS NEGATIVE. BENIGN BILATERAL FALLOPIAN TUBES AND OVARIES. B: PELVIC ADHESIONS, BIOPSY:   BENIGN FIBROUS TISSUE. NEGATIVE FOR MALIGNANCY. C: RIGHT PELVIC SIDEWALL PERITONEUM, BIOPSY:   ADENOCARCINOMA. D: RECTOSIGMOID EPIPLOICAE ADHESION, BIOPSY:   ADENOCARCINOMA. E: VAGINA, BIOPSY:   ACUTE INFLAMMATORY TISSUE. NO CARCINOMA SEEN. F: RIGHT PELVIC LYMPH NODE, DISSECTION:   THIRTEEN (13) BENIGN LYMPH NODES. G: LEFT PELVIC LYMPH NODE, DISSECTION:   EIGHT (8) BENIGN LYMPH NODES. H: RIGHT PARA-AORTIC LYMPH NODE, EXCISION:   ONE (1) BENIGN LYMPH NODE. I: LEFT PARA-AORTIC LYMPH NODE, EXCISION:   BENIGN FIBROADIPOSE TISSUE. NO LYMPHOID TISSUE SEEN. DIAGNOSIS COMMENT:   The intraoperative consultation (specimen A) is corroborated. Immunohistochemistry results for DNA mismatch repair proteins will be reported as an addendum. ENDOMETRIUM   SPECIMEN   Procedure: Total hysterectomy and bilateral salpingo-oophorectomy   TUMOR   Histologic Type: Endometrioid carcinoma, NOS   Histologic Grade: FIGO grade 1   Myometrial Invasion: Present   Depth of Myometrial Invasion (Millimeters): 6 mm   Myometrial Thickness (Millimeters): 28 mm   Percentage of Myometrial Invasion: 21 %   Uterine Serosa Involvement: Not identified   Lower Uterine Segment Involvement: Present, myoinvasive   Cervical Stromal Involvement: Present   Other Tissue / Organ Involvement: Upper Vagina, Pelvic wall, Rectosigmoid adhesion   Peritoneal Ascitic Fluid: Atypical: favor reactive   Lymphovascular Invasion: Not identified   MARGINS   Margins: Ectocervical / Vaginal Cuff Margin: Uninvolved by carcinoma   LYMPH NODES   Lymph Node Status:  All lymph nodes negative for tumor cells   Total Number of Pelvic Nodes Examined: 21   Total Number of Para-aortic Nodes Examined: 1   PATHOLOGIC STAGE CLASSIFICATION (pTNM, AJCC 8th Edition)   Primary Tumor (pT): pT3b   Regional Lymph Nodes (pN): pN0   Distant Metastasis (pM): pM1   Specify Site(s): Right Pelvic sidewall peritoneum   GROSS DESCRIPTION:   The specimen is received in nine parts. TEST(S) PERFORMED   Test(s) Performed: Mismatch Repair   Immunohistochemistry (IHC) Testing for Mismatch Repair (MMR)   Proteins: MLH1, MSH2, MSH6, PMS2   MLH1: Intact nuclear expression   MSH2: Loss of nuclear expression   MSH6: Loss of nuclear expression   PMS2: Intact nuclear expression     Medications:     Current Outpatient Medications on File Prior to Visit   Medication Sig Dispense Refill    cholecalciferol (Vitamin D3) 25 mcg (1,000 unit) cap Take  by mouth daily. Patient unsure of dose      naloxone (NARCAN) 4 mg/actuation nasal spray Use 1 spray intranasally, then discard. Repeat with new spray every 2 min as needed for opioid overdose symptoms, alternating nostrils. 1 Each 0    ondansetron hcl (Zofran) 4 mg tablet Take 1 Tablet by mouth every eight (8) hours as needed for Nausea. 20 Tablet 0    traZODone (DESYREL) 50 mg tablet Take  by mouth nightly.  LORazepam (Ativan) 0.5 mg tablet Take 1 Tablet by mouth nightly as needed for Anxiety. Max Daily Amount: 0.5 mg. (Patient not taking: Reported on 10/26/2021) 30 Tablet 0    ondansetron hcl (ZOFRAN) 4 mg tablet Take 1 Tablet by mouth every eight (8) hours as needed for Nausea or Vomiting. (Patient not taking: Reported on 7/13/2021) 30 Tablet 2    pregabalin (LYRICA) 75 mg capsule Take 1 Capsule by mouth two (2) times a day. Max Daily Amount: 150 mg. 60 Capsule 3    magic mouthwash solution Magic mouth wash   Maalox  Lidocaine 2% viscous   Diphenhydramine oral solution     Pharmacy to mix equal portions of ingredients to a total volume as indicated in the dispense amount. Indications: stomatitis, a condition with painful swelling and sores inside the mouth, Swish and spit 5-10 mL PRN every 6 hours for mouth sores.  (Patient not taking: Reported on 7/13/2021) 250 mL 0    promethazine (PHENERGAN) 25 mg tablet Take 1 Tab by mouth every six (6) hours as needed for Nausea. (Patient not taking: Reported on 12/6/2021) 30 Tab 2    lidocaine-prilocaine (EMLA) topical cream Apply  to affected area as needed for Pain. 30 g 0    megestroL (MEGACE) 400 mg/10 mL (10 mL) suspension Take 10 mL by mouth two (2) times a day. (Patient not taking: Reported on 7/13/2021) 1 Bottle 3    ondansetron hcl (Zofran) 4 mg tablet Take 1 Tab by mouth every six (6) hours. (Patient not taking: Reported on 12/6/2021) 30 Tab 0    omeprazole (PriLOSEC OTC) 20 mg tablet Take 20 mg by mouth daily.  mv-min/iron/folic/calcium/vitK (WOMEN'S MULTIVITAMIN PO) Take  by mouth daily.  cyclobenzaprine (FLEXERIL) 10 mg tablet Take  by mouth three (3) times daily as needed for Muscle Spasm(s). (Patient not taking: Reported on 7/13/2021)       No current facility-administered medications on file prior to visit. Allergies: Allergies   Allergen Reactions    Codeine Hives       OBJECTIVE:    Vitals:   Visit Vitals  LMP 01/09/2021 (Approximate)       Physical Examination:    General:  alert, cooperative, no distress   Abdomen: soft, non-tender   Incision: healed   Pelvic: NEFG, spec exam revealed normal mucosa, no masses. BME revealed no masses   Rectal: not done   Extremity:   extremities normal, atraumatic, no cyanosis or edema     IMPRESSION/PLAN:    Stage IIIBG1 endometrial cancer  The operative procedures and clinical results have been reviewed with the patient. Previously Discussed recommendation for adjuvant chemotherapy  \s/p carbo auc=6, taxol 175 mg/m2 IV every 3 wks x 6 cycles. Caris: BRCA2, MSI-H  Invitae: MSH2 pathogenic c/w park syndrome. Will refer to GI once complete  Decreased appetite-improved  Chemo induced anemia: resolved  Constipation- senokot, colace-improved  Body aches- percocet prn. Insomnia- ambien prn    Pelvic exam performed today  Neuropathy- given G3 neuropathy  S/p reduce taxol to 135 mg/m2. increase lyrica to 75 mg bid  CT reviewed.  No evidence of diseae  Discussed surveillance strategy and signs/symptoms of recurrence  Plan for f/u in 3 months      Total time spent was 40 minutes regarding diagnosis of endometrial cancer and >50% of this time was spent counseling and coordinating care.     Aiyana Bautista DO  Gynecologic Oncology  12/20/2021/12:59 PM

## 2021-12-20 NOTE — LETTER
12/20/2021    Patient: Ran Lebron   YOB: 1980   Date of Visit: 12/20/2021     Rhonda Johnson DO  [address] [facility] [address]  Via Fax: [phone]    Dear Rhonda Johnson DO,      Thank you for referring Ms. Bozena Green to [facility] for evaluation. My notes for this consultation are attached. If you have questions, please do not hesitate to call me. I look forward to following your patient along with you.       Sincerely,    Jack Osorio MD

## 2022-01-24 ENCOUNTER — HOSPITAL ENCOUNTER (OUTPATIENT)
Dept: INFUSION THERAPY | Age: 42
Discharge: HOME | End: 2022-01-24
Payer: COMMERCIAL

## 2022-01-24 VITALS
TEMPERATURE: 98.7 F | OXYGEN SATURATION: 97 % | RESPIRATION RATE: 18 BRPM | SYSTOLIC BLOOD PRESSURE: 107 MMHG | HEART RATE: 78 BPM | DIASTOLIC BLOOD PRESSURE: 83 MMHG

## 2022-01-24 LAB — CANCER AG125 SERPL-ACNC: 17 U/ML (ref 1.5–35)

## 2022-01-24 PROCEDURE — 36591 DRAW BLOOD OFF VENOUS DEVICE: CPT

## 2022-01-24 PROCEDURE — 74011000250 HC RX REV CODE- 250: Performed by: OBSTETRICS & GYNECOLOGY

## 2022-01-24 PROCEDURE — 74011250636 HC RX REV CODE- 250/636: Performed by: OBSTETRICS & GYNECOLOGY

## 2022-01-24 PROCEDURE — 77030012965 HC NDL HUBR BBMI -A

## 2022-01-24 PROCEDURE — 86304 IMMUNOASSAY TUMOR CA 125: CPT

## 2022-01-24 RX ORDER — HEPARIN 100 UNIT/ML
500 SYRINGE INTRAVENOUS ONCE
Status: DISCONTINUED | OUTPATIENT
Start: 2022-01-24 | End: 2022-01-24

## 2022-01-24 RX ORDER — HEPARIN SODIUM (PORCINE) LOCK FLUSH IV SOLN 100 UNIT/ML 100 UNIT/ML
500 SOLUTION INTRAVENOUS ONCE
Status: COMPLETED | OUTPATIENT
Start: 2022-01-24 | End: 2022-01-24

## 2022-01-24 RX ORDER — SODIUM CHLORIDE 0.9 % (FLUSH) 0.9 %
10-40 SYRINGE (ML) INJECTION AS NEEDED
Status: DISCONTINUED | OUTPATIENT
Start: 2022-01-24 | End: 2022-01-26 | Stop reason: HOSPADM

## 2022-01-24 RX ADMIN — HEPARIN SODIUM (PORCINE) LOCK FLUSH IV SOLN 100 UNIT/ML 500 UNITS: 100 SOLUTION at 13:21

## 2022-01-24 RX ADMIN — Medication 30 ML: at 13:20

## 2022-01-24 NOTE — PROGRESS NOTES
[facility] Progress Note    Date: 2022    Name: Milton Munoz    MRN: [id number]         : 1980    Port Flush/CA-125    Ms. Manuel Mosqueda was assessed and education was provided. Ms. Enrique's vitals were reviewed and patient was observed for 5 minutes prior to treatment. Visit Vitals  /83 (BP 1 Location: Left upper arm, BP Patient Position: Sitting)   Pulse 78   Temp 98.7 °F (37.1 °C)   Resp 18   SpO2 97%   Breastfeeding No     Port accessed per protocol, flushes easily with brisk blood return. After wasting 20mls whole blood lab drawn (ca-125) and sent to lab. Line flushed with 10ml NS and 500u heparin and de-accessed. Band aid applied to site. Ms. Manuel Mosqueda tolerated the infusion, and had no complaints. Patient armband removed and shredded. Ms. Manuel Mosqueda was discharged from [place] [address] in stable condition at 1325. She is to return on 2022 at 1000 for her next port flush appointment.     Irish Godinez RN  2022

## 2022-03-07 ENCOUNTER — HOSPITAL ENCOUNTER (OUTPATIENT)
Dept: INFUSION THERAPY | Age: 42
Discharge: HOME | End: 2022-03-07
Payer: COMMERCIAL

## 2022-03-07 VITALS
SYSTOLIC BLOOD PRESSURE: 116 MMHG | RESPIRATION RATE: 18 BRPM | DIASTOLIC BLOOD PRESSURE: 86 MMHG | OXYGEN SATURATION: 96 % | TEMPERATURE: 97.3 F | HEART RATE: 103 BPM

## 2022-03-07 LAB — CANCER AG125 SERPL-ACNC: 13 U/ML (ref 1.5–35)

## 2022-03-07 PROCEDURE — 96523 IRRIG DRUG DELIVERY DEVICE: CPT

## 2022-03-07 PROCEDURE — 77030012965 HC NDL HUBR BBMI -A

## 2022-03-07 PROCEDURE — 74011000250 HC RX REV CODE- 250: Performed by: OBSTETRICS & GYNECOLOGY

## 2022-03-07 PROCEDURE — 86304 IMMUNOASSAY TUMOR CA 125: CPT

## 2022-03-07 PROCEDURE — 74011250636 HC RX REV CODE- 250/636: Performed by: OBSTETRICS & GYNECOLOGY

## 2022-03-07 RX ORDER — HEPARIN 100 UNIT/ML
500 SYRINGE INTRAVENOUS ONCE
Status: COMPLETED | OUTPATIENT
Start: 2022-03-07 | End: 2022-03-07

## 2022-03-07 RX ORDER — SODIUM CHLORIDE 0.9 % (FLUSH) 0.9 %
10-40 SYRINGE (ML) INJECTION AS NEEDED
Status: DISCONTINUED | OUTPATIENT
Start: 2022-03-07 | End: 2022-03-09 | Stop reason: HOSPADM

## 2022-03-07 RX ADMIN — HEPARIN 500 UNITS: 100 SYRINGE at 10:23

## 2022-03-07 RX ADMIN — SODIUM CHLORIDE, PRESERVATIVE FREE 20 ML: 5 INJECTION INTRAVENOUS at 10:23

## 2022-03-07 NOTE — PROGRESS NOTES
[facility] Progress Note    Date: 2022    Name: José Matihs    MRN: [id number]         : 1980    Port Flush/CA-125    Ms. Sree Elizalde was assessed and education was provided. Ms. Enrique's vitals were reviewed. Visit Vitals  /86 (BP 1 Location: Right lower arm, BP Patient Position: Sitting)   Pulse (!) 103   Temp 97.3 °F (36.3 °C)   Resp 18   SpO2 96%   Breastfeeding No     Port accessed per protocol, flushes easily with brisk blood return. After wasting 10mls, blood lab drawn (ca-125) and sent to lab. Line flushed with 20ml NS and 500u heparin and de-accessed. Band aid applied to site. Ms. Sree Elizalde tolerated well, and had no complaints. Patient armband removed and shredded. Ms. Sree Elizalde was discharged from [place] [address] in stable condition at 1025. She is to return on 22 at 1000 for her next port flush appointment.     Ladonna Carreno RN  2022

## 2022-03-20 PROBLEM — K37 APPENDICITIS: Status: ACTIVE | Noted: 2021-12-06

## 2022-03-20 PROBLEM — C54.1 ENDOMETRIAL CANCER (HCC): Status: ACTIVE | Noted: 2021-02-04

## 2022-03-21 ENCOUNTER — HOSPITAL ENCOUNTER (OUTPATIENT)
Age: 42
Setting detail: OUTPATIENT SURGERY
Discharge: HOME | End: 2022-03-21
Attending: INTERNAL MEDICINE | Admitting: INTERNAL MEDICINE
Payer: COMMERCIAL

## 2022-03-21 VITALS
OXYGEN SATURATION: 100 % | HEIGHT: 67 IN | WEIGHT: 207.9 LBS | HEART RATE: 89 BPM | TEMPERATURE: 97.7 F | BODY MASS INDEX: 32.63 KG/M2 | SYSTOLIC BLOOD PRESSURE: 103 MMHG | DIASTOLIC BLOOD PRESSURE: 66 MMHG | RESPIRATION RATE: 16 BRPM

## 2022-03-21 PROCEDURE — 77030039961 HC KT CUST COLON BSC -D: Performed by: INTERNAL MEDICINE

## 2022-03-21 PROCEDURE — 74011250636 HC RX REV CODE- 250/636: Performed by: INTERNAL MEDICINE

## 2022-03-21 PROCEDURE — 76040000007: Performed by: INTERNAL MEDICINE

## 2022-03-21 PROCEDURE — 77030040361 HC SLV COMPR DVT MDII -B: Performed by: INTERNAL MEDICINE

## 2022-03-21 PROCEDURE — 2709999900 HC NON-CHARGEABLE SUPPLY: Performed by: INTERNAL MEDICINE

## 2022-03-21 PROCEDURE — G0500 MOD SEDAT ENDO SERVICE >5YRS: HCPCS | Performed by: INTERNAL MEDICINE

## 2022-03-21 PROCEDURE — 99153 MOD SED SAME PHYS/QHP EA: CPT | Performed by: INTERNAL MEDICINE

## 2022-03-21 RX ORDER — SODIUM CHLORIDE 9 MG/ML
1000 INJECTION, SOLUTION INTRAVENOUS CONTINUOUS
Status: CANCELLED | OUTPATIENT
Start: 2022-03-21 | End: 2022-03-21

## 2022-03-21 RX ORDER — SODIUM CHLORIDE 0.9 % (FLUSH) 0.9 %
5-40 SYRINGE (ML) INJECTION EVERY 8 HOURS
Status: CANCELLED | OUTPATIENT
Start: 2022-03-21

## 2022-03-21 RX ORDER — DIPHENHYDRAMINE HYDROCHLORIDE 50 MG/ML
50 INJECTION, SOLUTION INTRAMUSCULAR; INTRAVENOUS ONCE
Status: CANCELLED | OUTPATIENT
Start: 2022-03-21 | End: 2022-03-21

## 2022-03-21 RX ORDER — NALOXONE HYDROCHLORIDE 0.4 MG/ML
0.4 INJECTION, SOLUTION INTRAMUSCULAR; INTRAVENOUS; SUBCUTANEOUS
Status: DISCONTINUED | OUTPATIENT
Start: 2022-03-21 | End: 2022-03-21 | Stop reason: HOSPADM

## 2022-03-21 RX ORDER — MIDAZOLAM HYDROCHLORIDE 1 MG/ML
.25-5 INJECTION, SOLUTION INTRAMUSCULAR; INTRAVENOUS
Status: DISCONTINUED | OUTPATIENT
Start: 2022-03-21 | End: 2022-03-21 | Stop reason: HOSPADM

## 2022-03-21 RX ORDER — FENTANYL CITRATE 50 UG/ML
100 INJECTION, SOLUTION INTRAMUSCULAR; INTRAVENOUS
Status: DISCONTINUED | OUTPATIENT
Start: 2022-03-21 | End: 2022-03-21 | Stop reason: HOSPADM

## 2022-03-21 RX ORDER — ATROPINE SULFATE 0.1 MG/ML
0.5 INJECTION INTRAVENOUS
Status: CANCELLED | OUTPATIENT
Start: 2022-03-21 | End: 2022-03-22

## 2022-03-21 RX ORDER — SODIUM CHLORIDE 0.9 % (FLUSH) 0.9 %
5-40 SYRINGE (ML) INJECTION AS NEEDED
Status: CANCELLED | OUTPATIENT
Start: 2022-03-21

## 2022-03-21 RX ORDER — SODIUM CHLORIDE 9 MG/ML
125 INJECTION, SOLUTION INTRAVENOUS CONTINUOUS
Status: DISCONTINUED | OUTPATIENT
Start: 2022-03-21 | End: 2022-03-21 | Stop reason: HOSPADM

## 2022-03-21 RX ORDER — FLUMAZENIL 0.1 MG/ML
0.2 INJECTION INTRAVENOUS
Status: DISCONTINUED | OUTPATIENT
Start: 2022-03-21 | End: 2022-03-21 | Stop reason: HOSPADM

## 2022-03-21 RX ORDER — ONDANSETRON 2 MG/ML
4-8 INJECTION INTRAMUSCULAR; INTRAVENOUS ONCE
Status: COMPLETED | OUTPATIENT
Start: 2022-03-21 | End: 2022-03-21

## 2022-03-21 RX ORDER — DEXTROMETHORPHAN/PSEUDOEPHED 2.5-7.5/.8
1.2 DROPS ORAL
Status: CANCELLED | OUTPATIENT
Start: 2022-03-21

## 2022-03-21 RX ORDER — EPINEPHRINE 0.1 MG/ML
1 INJECTION INTRACARDIAC; INTRAVENOUS
Status: CANCELLED | OUTPATIENT
Start: 2022-03-21 | End: 2022-03-22

## 2022-03-21 RX ADMIN — SODIUM CHLORIDE 125 ML/HR: 9 INJECTION, SOLUTION INTRAVENOUS at 11:08

## 2022-03-21 RX ADMIN — ONDANSETRON 4 MG: 2 INJECTION INTRAMUSCULAR; INTRAVENOUS at 11:47

## 2022-03-21 NOTE — PROCEDURES
[facility]  Colonoscopy Procedure Report  _______________________________________________________  Patient: Raj Vera                                        Attending Physician: Marcy Adkins MD    Patient ID: [id number]                                    Referring Physician: Kriss Alcaraz DO    Exam Date: 3/21/2022      Introduction: A  43 y.o. female patient, presents for inpatient Colonoscopy    Indications: Pt Dr. 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 She also has a positive FHx of (CRC) colorectal cancer:  Brother- colon cancer (Dx'd in his 29's) & FHx of Esophageal Cancer: Father (Hx of work exposure to harsh chemicals, per Pt) & FHx of Cervical Cancer: Mother had Cervical cancer that metastasized to her lungs. BMI: 33.20, BM: 1x daily. Had total hysterectomy and appendectomy. Consent: The benefits, risks, and alternatives to the procedure were discussed and informed consent was obtained from the patient. Preparation: EKG, pulse, pulse oximetry and blood pressure were monitored throughout the procedure. ASA Classification: Class I- . The heart is an S1-S2 and regular heart rate and rhythm. Lungs are clear to auscultation and percussion. Abdomen is soft, nondistended, and nontender. Mental Status: awake, alert, and oriented to person, place, and time    Medications:  · Fentanyl 100 mcg IV before procedure. · Versed 7 mg IV throughout the procedure. Rectal Exam: Normal Rectal Exam. No Blood. Pathology Specimens:  0    Procedure: The colonoscope was passed with mild difficulty through the anus under direct visualization and advanced to the cecum and 25 cm inside the terminal ileum.  The scope was withdrawn and the mucosa was carefully examined. The quality of the preparation was excellent. The views were excellent. The patient's toleration of the procedure was excellent. The exam was done twice to the cecum. Total time is 21 minutes and withdrawal time is 16 minutes. Findings:    Rectum:   Normal.   Sigmoid:   Tortuous sigmoid colon. Descending Colon:   Normal   Transverse Colon:   Tortuous hepatic flexure. Ascending Colon:   Normal  Cecum:   Normal  Terminal Ileum:   Normal.       Unplanned Events: There were no unplanned events. Estimated Blood Loss: None  IMPLANTS: * No implants in log *  Impressions: Tortuous colon and hepatic flexure. Normal Mucosa. No blood, diverticula, polyps or AVM found. Complications: None; patient tolerated the procedure well. Recommendations:  · Discharge home when standard parameters are met. · Resume a high fiber diet. · Resume own medications. Colonoscopy recommendation in 3 years.   · Take Miralax and/ or Colace 100 mg on regular basis if constipated    Procedure Codes:     COLONOSCOPY [[id number] (Type: Custom)]    Endoscope Information:  Model Number(s)    [id number]   Assistant: None  Signed By: Kaity Dominguez MD Date: 3/21/2022

## 2022-03-21 NOTE — DISCHARGE INSTRUCTIONS
Nuris Rahman  [id number]  1980    COLON DISCHARGE INSTRUCTIONS    Discomfort:  Redness at IV site- apply warm compress to area; if redness or soreness persist- contact your physician  There may be a slight amount of blood passed from the rectum  Gaseous discomfort- walking, belching will help relieve any discomfort  You may not operate a vehicle til the next day. You may not engage in an occupation involving machinery or appliances til the next day. You may not drink alcoholic beverages til the next day. DIET:   High fiber diet. ACTIVITY:  You may not  resume your normal daily activities til the next day. it is recommended that you spend the remainder of the day resting -  avoid any strenuous activity. CALL M.D.  IF ANY SIGN OF:   Increasing pain, nausea, vomiting  Abdominal distension (swelling)  New increased bleeding (oral or rectal)  Fever (chills)  Pain in chest area  Bloody discharge from nose or mouth  Shortness of breath    You may  take any Advil, Aspirin, Ibuprofen, Motrin, Aleve, or Goodys but preferably  Tylenol as needed for pain. Post procedure diagnosis:  Torturous colon; Follow-up Instructions: Your follow up colonoscopy will be in 3 years. Bonifacio Mathias MD  March 21, 2022      DISCHARGE SUMMARY from Nurse    PATIENT INSTRUCTIONS:    After general anesthesia or intravenous sedation, for 24 hours or while taking prescription Narcotics:  · Limit your activities  · Do not drive and operate hazardous machinery  · Do not make important personal or business decisions  · Do  not drink alcoholic beverages  · If you have not urinated within 8 hours after discharge, please contact your surgeon on call.     Report the following to your surgeon:  · Excessive pain, swelling, redness or odor of or around the surgical area  · Temperature over 100.5  · Nausea and vomiting lasting longer than 4 hours or if unable to take medications  · Any signs of decreased circulation or nerve impairment to extremity: change in color, persistent  numbness, tingling, coldness or increase pain  · Any questions    What to do at Home:  Recommended activity: Activity as tolerated and no driving for today. If you experience any of the following symptoms as above, please follow up with Dr. Eugene Beal. *  Please give a list of your current medications to your Primary Care Provider. *  Please update this list whenever your medications are discontinued, doses are      changed, or new medications (including over-the-counter products) are added. *  Please carry medication information at all times in case of emergency situations. These are general instructions for a healthy lifestyle:    No smoking/ No tobacco products/ Avoid exposure to second hand smoke  Surgeon General's Warning:  Quitting smoking now greatly reduces serious risk to your health. Obesity, smoking, and sedentary lifestyle greatly increases your risk for illness    A healthy diet, regular physical exercise & weight monitoring are important for maintaining a healthy lifestyle    You may be retaining fluid if you have a history of heart failure or if you experience any of the following symptoms:  Weight gain of 3 pounds or more overnight or 5 pounds in a week, increased swelling in our hands or feet or shortness of breath while lying flat in bed. Please call your doctor as soon as you notice any of these symptoms; do not wait until your next office visit. The discharge information has been reviewed with the patient and spouse. The patient and spouse verbalized understanding. Discharge medications reviewed with the patient and spouse and appropriate educational materials and side effects teaching were provided.   ___________________________________________________________________________________________________________________________________    Patient armband removed and shredded

## 2022-03-21 NOTE — H&P
Assessment/Plan  # Detail Type Description    1. Assessment Encounter for screening for cancer of colon (Z12.11). Impression Pt Dr. 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 She also has a positive FHx of (CRC) colorectal cancer:  Brother- colon cancer (Dx'd in his 29's) & FHx of Esophageal Cancer: Father (Hx of work exposure to harsh chemicals, per Pt) & FHx of Cervical Cancer: Mother had Cervical cancer that metastasized to her lungs. _________________________________________________  *Asymptomatic of GI complaints. BMI: 33.20, BM: 1x daily. No reported hx of additional abdominal surgeries, strokes, or CAD. Patient Plan -Rx Sent: Reglan (2 doses to be taken 30 minutes prior to each dose of bowel prep to pre-medicate for nausea)  _______________________________________  *C-scope Plan: (Schedule 1st)  First colonoscopy ordered with Dr. Wade Hudson with Miralax bowel prep, and Mag Citrate 2 days before prep, and Miralax and stool softeners starting 3 days before prep.  -Pt must be off iron supplements at least 7 days prior to procedure. *C-scope Risks:  Stressed importance of following all bowel preparation instructions. Explained the procedure to the patient including all risks and benefits. These risks consist of missed lesions on exam, bleeding, and bowel perforation with possible need for admission to the hospital, and in the most extensive of  circumstances, the patient may require surgery. Pt verbalized understanding of these risks and is agreeable with this procedure    Plan Orders Further diagnostic evaluations ordered today include(s) DIAGNOSTIC COLONOSCOPY to be performed. She will be scheduled for GASTROENTEROLOGY PROCEDURE, Next Lab Date is within 3 month on 03/21/2022. Clinical information/comments: at location [facility]. The surgeon scheduled is Giovanni Estrella MD. An assistant has not been requested. 2. Assessment Family history of malignant neoplasm of digestive organ (Z80.0). Impression *FHx of (CRC) colorectal cancer:  -Brother diagnosed with Colon Cancer in his 29's, still Living. He has never been tested for Corona Syndrome    *FHx of Esophageal Cancer: Father (Hx of work exposure to harsh chemicals, per Pt)    *FHx of Cervical Cancer: Mother had Cervical cancer that metastasized to her lungs and brain. Plan Orders She will be scheduled for GASTROENTEROLOGY PROCEDURE, Next Lab Date is within 3 month on 03/28/2022. Clinical information/comments: at location [facility]. The surgeon scheduled is Giovanni Estrella MD. An assistant has not been requested. 3. 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 Impression Genetic alteration have been identified from pathology specimens sent off for genome sequencing. 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 Positive protein expression revealed by IHC of MLH1, as well as PMS2.   ____________________________________  \"Interpretation: A pathogenic frameshift mutation was detected in MSH6. This specific mutation frequently occurs as a somatic mutation in tumors with microsatellite instability due to other causes. It has also been reported as a germline mutation, causal for Corona Syndrome. \"   (Results date 2/10/2021 of Genetic Testing Document: Appendix 6 of 16)    *Aside from herself, and her Brother; Pt denies any prior generational hx of Corona-related cancers: i.e.) Colorectal cancer, Gastric, Pancreatic, Urinary Tract, Prostatic, Uterine, and Ovarian  ____________________________________  *She has never had an Endoscopy, or a Colonoscopy before.  We need to do baseline EGD and Colonoscopy exams now, and start her on routine interval of Q3 year EGD & C-scopes moving forward for Corona surveillance. *Recommend Early Family screening for her children, and her Brother's children. (Also, recommend her Brother be tested for Corona Syndrome, in light of his colon cancer history). Patient Plan *EGD Plan: (Schedule 2nd)  Will proceed with EGD with Dr. Morgan Hardy, to evaluate upper GI tract for abnormalities, evidence Upper GI malignancies, and Sanchez's epithelium with biopsies, polypectomy, or dilation as indicated. -Pt must be off iron supplements at least 7 days prior to procedure. *EGD Risks:  Explained risks of procedure to include bleeding, infection, reaction to sedation, and perforation with possible need for admission to the hospital, and in the most extensive of  circumstances, the patient may require surgery. Pt verbalized understanding of these risks and is agreeable with this procedure    Plan Orders Further diagnostic evaluations ordered today include(s) UPPER GI ENDOSCOPY, DIAGNOSIS to be performed. 4. Assessment Malignant neoplasm of endometrium (C54.1). Impression Diagnosed with Stage IIIBG1 Endometrial cancer, s/p Robotic-assisted Total lap. Hysterectomy, BSO, upper vaginectomy, bilateral pelvic and para-aortic lymph node dissection w/peritoneal biopsies and washings, then cystoscopy done on 1/14/2021  by Dr. Srikanth Alvarado ([address] [facility] @[facility]. Followed by Chemotherapy, completing cycle #6 on 6/22/2021. (No Radiation required). Negative CT scan of Abdomen & Pelvis w/Contrast dated 7/8/2021 @[facility]. Patient Plan See Above              This 39year old  patient was referred by Miryam Adamson. This 39year old female presents for Colon Cancer Screening. History of Present Illness  1. Colon Cancer Screening   No prior screening. Risk Factors: family history - sibling and Brother- colon cancer.   Pertinent negatives include abdominal pain, change in bowel habits, change in stool caliber, constipation, decreased appetite, diarrhea, melena, nausea, rectal bleeding, vomiting, weight gain and weight loss. Additional information: Patient has family history of colon cancer and BM: 1x daily. Pt stated she had endometrial cancer. Problem List  Problem Description Onset Date Chronic Clinical Status Notes   Hereditary non-polyposis colon cancer gene mutation positive 2021 N     Family history of hereditary nonpolyposis colon cancer 2021 N     Screening for malignant neoplasm of colon done 2021 N       Past Medical/Surgical History   (Detailed)  Disease/disorder Onset Date Management Date Comments   Endometrial Cancer (Tumor: + Corona Syndrome)  Total Lap. Hysterectomy w/BSO, vaginectomy (2021 @[facility]) - Dr. Walker White; Chemotherapy (No Radiation)           Family History   (Detailed)    Relationship Family Member Name  Age at Death Condition Onset Age Cause of Death   Brother    Colon Cancer (in his 29's): Needs Corona Testing  N   Father  Y  Esophageal Cancer (Work exposure to harsh Chemicals)  Y   Mother  Y  Metastatic Cervical Cancer (spread to lungs, then Brain)  Y     Social History  (Detailed)  Tobacco use reviewed. Preferred language is [place]. Marital Status/Family/Social Support  Marital status:      Tobacco use status: Current non-smoker. Smoking status: Never smoker. Tobacco Screening  Patient has never used tobacco. Patient has not used tobacco in the last 30 days. Patient has not used smokeless tobacco in the last 30 days. Smoking Status  Type Smoking Status Usage Per Day Years Used Pack Years Total Pack Years    Never smoker         Alcohol  There is no history of alcohol use. Caffeine  The patient uses caffeine: coffee.       Medications (active prior to today)  Medication Instructions Start Date Stop Date Refilled Elsewhere   multivitamin tablet  //   Y   Lyrica 75 mg capsule take 1 capsule by oral route 2 times every day //   Y trazodone 50 mg tablet take 1 tablet by oral route  every day at bedtime //   Y   lidocaine HCl 3 % topical cream apply by topical route 2 times every day to the affected area(s) //   Y     Patient Status   Completed with information received for patient in a summary of care record. Medication Reconciliation  Medications reconciled today. Medication Reviewed  Adherence Medication Name Sig Desc Elsewhere Status   taking as directed lidocaine HCl 3 % topical cream apply by topical route 2 times every day to the affected area(s) Y Verified   taking as directed trazodone 50 mg tablet take 1 tablet by oral route  every day at bedtime Y Verified   taking as directed Lyrica 75 mg capsule take 1 capsule by oral route 2 times every day Y Verified   taking as directed multivitamin tablet  Y Verified     Medications (Added, Continued or Stopped today)  Start Date Medication Directions PRN Status PRN Reason Instruction Stop Date    lidocaine HCl 3 % topical cream apply by topical route 2 times every day to the affected area(s) N       Lyrica 75 mg capsule take 1 capsule by oral route 2 times every day N       multivitamin tablet  N      11/30/2021 Reglan 10 mg tablet take 1 tablet PO 30 minutes before each part of colonoscopy prep N       trazodone 50 mg tablet take 1 tablet by oral route  every day at bedtime N        Allergies  Ingredient Reaction (Severity) Medication Name Comment   BOWEL PREP COMBINATION NO. 1      BOWEL PREP COMBINATION NO.2, TWO PART PREP      CODEINE        Reviewed, updated. Orders  Status Lab Order Time Frame Comments   ordered DIAGNOSTIC COLONOSCOPY     ordered UPPER GI ENDOSCOPY, DIAGNOSIS     ordered GASTROENTEROLOGY PROCEDURE within 3 month at location [address] surgeon scheduled is Scott Seals MD. An assistant has not been requested.    ordered GASTROENTEROLOGY PROCEDURE within 3 month at location [facility]. The surgeon scheduled is [facility] Rachel DESIR. An assistant has not been requested. Review of Systems  System Neg/Pos Details   Constitutional Negative Fever, Weight gain and Weight loss. ENMT Negative Sinus Infection. Eyes Negative Double vision. Respiratory Negative Asthma, Chronic cough and Dyspnea. Cardio Negative Chest pain, Edema and Irregular heartbeat/palpitations. GI Negative Abdominal pain, Change in bowel habits, Change in stool caliber, Constipation, Decreased appetite, Diarrhea, Dysphagia, Heartburn, Hematemesis, Hematochezia, Melena, Nausea, Rectal bleeding, Reflux and Vomiting.  Negative Dysuria and Hematuria. Endocrine Negative Cold intolerance and Heat intolerance. Neuro Negative Dizziness, Headache, Numbness and Tremors. Psych Negative Anxiety, Depression and Increased stress. Integumentary Negative Hives, Pruritus and Rash. MS Negative Back pain, Joint pain and Myalgia. Hema/Lymph Negative Easy bleeding, Easy bruising and Lymphadenopathy. Allergic/Immuno Negative Food allergies and Immunosuppression. Vital Signs   Height  Time ft in cm Last Measured Height Position   12:55 PM 5.0 7.00 170.18 11/30/2021      Weight/BSA/BMI  Time lb oz kg Context BMI kg/m2 BSA m2   12:55 .00  96.162  33.20 2.13     03/21/22 1041 97.5 °F (36.4 °C) 98 122/74 --        Respiratory Therapy (last day)    Date/Time Resp SpO2 O2 Device O2 Flow Rate (L/min) [facility]   03/21/22 1041 16 98 % None (Room air) -- [address]       Physical  Exam  Exam Findings Details   Female GI Quick Visit Comments Short Frame. Constitutional Normal Well developed. Eyes Normal Conjunctiva - Right: Normal, Left: Normal. Sclera - Right: Normal, Left: Normal.   Nasopharynx Normal Lips/teeth/gums - Normal.   Neck Exam Normal Inspection - Normal.   Respiratory Normal Inspection - Normal.   Cardiovascular Normal Rhythm - Regular. Extra sounds - None. Murmurs - None.    Vascular Normal Pulses - Brachial: Normal.   Skin Normal Inspection - Normal. Musculoskeletal Normal Hands/Wrist - Right: Normal, Left: Normal.   Extremity Normal No edema. Neurological Normal Fine motor skills - Normal.   Psychiatric Normal Orientation - Oriented to time, place, person & situation. Appropriate mood and affect. Patient Education  # Patient Education   1.  Colon Cancer Screening: Care Instructions         Active Patient Care Team Members  Name Contact Agency Type Support Role Relationship Active Date Inactive Date Specialty   [facility]   Emergency Contact Spouse      Celine So   encounter provider    Gastroenterology     No change in H&P

## 2022-03-25 ENCOUNTER — OFFICE VISIT (OUTPATIENT)
Dept: ONCOLOGY | Age: 42
End: 2022-03-25
Payer: COMMERCIAL

## 2022-03-25 VITALS
WEIGHT: 209 LBS | OXYGEN SATURATION: 97 % | SYSTOLIC BLOOD PRESSURE: 124 MMHG | HEART RATE: 97 BPM | DIASTOLIC BLOOD PRESSURE: 79 MMHG | BODY MASS INDEX: 32.8 KG/M2 | TEMPERATURE: 97.8 F | HEIGHT: 67 IN

## 2022-03-25 DIAGNOSIS — C54.1 ENDOMETRIAL CANCER (HCC): Primary | ICD-10-CM

## 2022-03-25 PROCEDURE — 99213 OFFICE O/P EST LOW 20 MIN: CPT | Performed by: OBSTETRICS & GYNECOLOGY

## 2022-03-25 RX ORDER — DEXTROMETHORPHAN/PSEUDOEPHED 2.5-7.5/.8
1.2 DROPS ORAL
Status: CANCELLED | OUTPATIENT
Start: 2022-03-25

## 2022-03-25 RX ORDER — ATROPINE SULFATE 0.1 MG/ML
0.5 INJECTION INTRAVENOUS
Status: CANCELLED | OUTPATIENT
Start: 2022-03-25 | End: 2022-03-26

## 2022-03-25 RX ORDER — DIPHENHYDRAMINE HYDROCHLORIDE 50 MG/ML
50 INJECTION, SOLUTION INTRAMUSCULAR; INTRAVENOUS ONCE
Status: CANCELLED | OUTPATIENT
Start: 2022-03-25 | End: 2022-03-25

## 2022-03-25 RX ORDER — EPINEPHRINE 0.1 MG/ML
1 INJECTION INTRACARDIAC; INTRAVENOUS
Status: CANCELLED | OUTPATIENT
Start: 2022-03-25 | End: 2022-03-26

## 2022-03-25 RX ORDER — SODIUM CHLORIDE 0.9 % (FLUSH) 0.9 %
5-40 SYRINGE (ML) INJECTION AS NEEDED
Status: CANCELLED | OUTPATIENT
Start: 2022-03-25

## 2022-03-25 RX ORDER — SODIUM CHLORIDE 0.9 % (FLUSH) 0.9 %
5-40 SYRINGE (ML) INJECTION EVERY 8 HOURS
Status: CANCELLED | OUTPATIENT
Start: 2022-03-25

## 2022-03-25 NOTE — PROGRESS NOTES
[address], [address], [address] [facility]  [address] [facility], [address]  [place], [address]  [phone]  [facility]        Patient ID:  Name: Jose Gaxiola  MRM: [id number]  : 1980/42 y.o. Date: 3/25/2022    SUBJECTIVE:    This is a 43 y.o.  female with h/o Stage IIIBG1 endometrial caner s/p Robotic-assisted total laparoscopic hysterectomy, bilateral salpingo-oophorectomy, upper vaginectomy; bilateral pelvic and para-aortic lymph node dissection; peritoneal biopsies and washings; cystoscopy. on 2021  S/p cycle #6   of Carbo/taxol on 2021 with dose reduction of taxol at cycle #3. Here for f/u. Still has some neuropathy in taking Lyrica. Denies any vaginal bleeding. Recently had colonoscopy which was normal.       Imaging  CT 2021    FINDINGS:     LOWER CHEST: Bibasilar atelectasis.     LIVER, BILIARY: Liver is normal. No biliary dilation. Gallbladder is  unremarkable.     PANCREAS: Normal.     SPLEEN: Normal.     ADRENALS: Normal.     KIDNEYS/URETERS/BLADDER: No calcification, hydronephrosis, or soft tissue  attenuation renal mass. Nonspecific circumferential bladder wall thickening,  unchanged.     PELVIC ORGANS: Prior DIONNA/BSO.     VASCULATURE: Unremarkable     LYMPH NODES: No enlarged lymph nodes.     GASTROINTESTINAL TRACT: Acute appendicitis with dilated appendix and  periappendiceal inflammatory changes. No evidence of perforation or drainable  fluid collection. No bowel obstruction. Moderate hiatal hernia.     BONES: No acute or aggressive osseous abnormalities identified.     OTHER: None.     _______________     IMPRESSION     Acute appendicitis. No drainable fluid collection.     Nonspecific circumferential bladder wall thickening, unchanged. CT; 2021  FINDINGS:     LUNGS:  No acute pulmonary infiltrate.  The 2 mm left lower lobe pulmonary nodule  seen previously is no longer present, suggesting that it was infectious or  inflammatory in etiology. No new or suspicious pulmonary nodule or mass.     PLEURA: No pleural effusion or pneumothorax.     AIRWAY: Normal.     MEDIASTINUM: Left jugular Mediport catheter, tip overlying distal SVC. Large  paraesophageal hernia, containing most of the stomach and fat density,  unchanged. No global cardiomegaly. No pericardial effusion. Great vessels  unremarkable. Thyroid and esophagus appear within normal limits.     LIVER AND BILIARY: No suspicious liver lesions. No biliary dilation. Gallbladder unremarkable.     SPLEEN:  Normal.     PANCREAS:  Normal.     ADRENALS:  Normal.     KIDNEYS:  Normal.     LYMPH NODES:  No thoracic lymphadenopathy. No mesenteric or retroperitoneal  lymphadenopathy.     GI TRACT:  Normal caliber small and large bowel loops. No morphology of bowel  obstruction. No bowel wall thickening. Normal appendix.     VASCULATURE:  Normal caliber lower thoracic and abdominal aorta with mild  atherosclerotic vascular calcification.     OTHER:   No ascites or free intraperitoneal air. Small, fat filled periumbilical  ventral hernia.     PELVIS ORGANS: Nonspecific circumferential bladder wall thickening. Bladder is  mildly distended. Prior hysterectomy. No focal or recurrent pelvic mass.     OSSEOUS STRUCTURES:  No acute osseous abnormality. Mild multilevel degenerative  disk disease and facet arthropathy. IMPRESSION     No evidence of residual or recurrent neoplasm. Left lower lobe pulmonary nodule  seen previously is no longer present. Nonspecific circumferential bladder wall  thickening,  potentially cystitis. 7/6/2021  FINDINGS:     CHEST:     LUNGS: 2 mm posterior left lower lobe lung nodule (thin axial image 129). No  abnormal opacities.     PLEURA: Normal, with no effusion or pneumothorax.     AIRWAY: Normal.     MEDIASTINUM: Normal heart size. No pericardial effusion.  Given the limitations  of cardiac motion, great vessels are unremarkable.     LYMPH NODES: No enlarged lymph nodes.     ===============     ABDOMEN/PELVIS:     LIVER, BILIARY: Liver is normal. No biliary dilation. Gallbladder is  unremarkable.     PANCREAS: Normal.     SPLEEN: Normal.     ADRENALS: Normal.     KIDNEYS/URETERS/BLADDER: Normal.     LYMPH NODES: No enlarged lymph nodes.     GASTROINTESTINAL TRACT: No bowel dilation or wall thickening. Unremarkable  appendix.     PELVIC ORGANS: Hysterectomy.     VASCULATURE: Unremarkable.     BONES: No acute or aggressive osseous abnormalities identified.     OTHER: No ascites. Small fat-containing umbilical hernia.     _______________     IMPRESSION     1. Nonspecific left lower lobe lung nodule (2 mm), not imaged on prior abdominal  CT. Follow-up per oncologic protocol.     2. Otherwise, no evidence for recurrent or metastatic disease within the chest,  abdomen or pelvis.    Component      Latest Ref Rng & Units 10/26/2021           9:00 AM   CA-125      1.5 - 35.0 U/mL 14     Component      Latest Ref Rng & Units 6/15/2021           9:00 AM   CA-125      1.5 - 35.0 U/mL 10     Component      Latest Ref Rng & Units 5/11/2021           8:30 AM   CA-125      1.5 - 35.0 U/mL 14     Component      Latest Ref Rng & Units 4/19/2021           9:00 AM   CA-125      1.5 - 35.0 U/mL 15     Component      Latest Ref Rng & Units 3/30/2021           9:00 AM   CA-125      1.5 - 35.0 U/mL 19     Component      Latest Ref Rng & Units 3/9/2021           9:19 AM   CA-125      1.5 - 35.0 U/mL 30     Component      Latest Ref Rng & Units 2/16/2021          11:45 AM   CA-125      1.5 - 35.0 U/mL 31       Her pathology revealed      A: CERVIX, UTERUS, BILATERAL FALLOPIAN TUBES, AND UPPER VAGINA, TOTAL HYSTERECTOMY WITH BILATERAL SALPINGO-OOPHORECTOMY AND PARTIAL VAGINECTOMY:   ENDOMETRIAL ENDOMETRIOID ADENOCARCINOMA, FIGO GRADE 1, WITH EXTENSIVE INVASION INTO THE CERVIX AND UPPER VAGINA (PLEASE SEE COMMENTS AND SYNOPTIC REPORT). SURGICAL MARGINS NEGATIVE. BENIGN BILATERAL FALLOPIAN TUBES AND OVARIES. B: PELVIC ADHESIONS, BIOPSY:   BENIGN FIBROUS TISSUE. NEGATIVE FOR MALIGNANCY. C: RIGHT PELVIC SIDEWALL PERITONEUM, BIOPSY:   ADENOCARCINOMA. D: RECTOSIGMOID EPIPLOICAE ADHESION, BIOPSY:   ADENOCARCINOMA. E: VAGINA, BIOPSY:   ACUTE INFLAMMATORY TISSUE. NO CARCINOMA SEEN. F: RIGHT PELVIC LYMPH NODE, DISSECTION:   THIRTEEN (13) BENIGN LYMPH NODES. G: LEFT PELVIC LYMPH NODE, DISSECTION:   EIGHT (8) BENIGN LYMPH NODES. H: RIGHT PARA-AORTIC LYMPH NODE, EXCISION:   ONE (1) BENIGN LYMPH NODE. I: LEFT PARA-AORTIC LYMPH NODE, EXCISION:   BENIGN FIBROADIPOSE TISSUE. NO LYMPHOID TISSUE SEEN. DIAGNOSIS COMMENT:   The intraoperative consultation (specimen A) is corroborated. Immunohistochemistry results for DNA mismatch repair proteins will be reported as an addendum. ENDOMETRIUM   SPECIMEN   Procedure: Total hysterectomy and bilateral salpingo-oophorectomy   TUMOR   Histologic Type: Endometrioid carcinoma, NOS   Histologic Grade: FIGO grade 1   Myometrial Invasion: Present   Depth of Myometrial Invasion (Millimeters): 6 mm   Myometrial Thickness (Millimeters): 28 mm   Percentage of Myometrial Invasion: 21 %   Uterine Serosa Involvement: Not identified   Lower Uterine Segment Involvement: Present, myoinvasive   Cervical Stromal Involvement: Present   Other Tissue / Organ Involvement: Upper Vagina, Pelvic wall, Rectosigmoid adhesion   Peritoneal Ascitic Fluid: Atypical: favor reactive   Lymphovascular Invasion: Not identified   MARGINS   Margins: Ectocervical / Vaginal Cuff Margin: Uninvolved by carcinoma   LYMPH NODES   Lymph Node Status:  All lymph nodes negative for tumor cells   Total Number of Pelvic Nodes Examined: 21   Total Number of Para-aortic Nodes Examined: 1   PATHOLOGIC STAGE CLASSIFICATION (pTNM, AJCC 8th Edition)   Primary Tumor (pT): pT3b   Regional Lymph Nodes (pN): pN0   Distant Metastasis (pM): pM1   Specify Site(s): Right Pelvic sidewall peritoneum   GROSS DESCRIPTION:   The specimen is received in nine parts. TEST(S) PERFORMED   Test(s) Performed: Mismatch Repair   Immunohistochemistry (IHC) Testing for Mismatch Repair (MMR)   Proteins: MLH1, MSH2, MSH6, PMS2   MLH1: Intact nuclear expression   MSH2: Loss of nuclear expression   MSH6: Loss of nuclear expression   PMS2: Intact nuclear expression     Medications:     Current Outpatient Medications on File Prior to Visit   Medication Sig Dispense Refill    lidocaine-prilocaine (EMLA) topical cream Apply  to affected area as needed for Pain. 30 g 0    cholecalciferol (Vitamin D3) 25 mcg (1,000 unit) cap Take  by mouth daily. Patient unsure of dose      traZODone (DESYREL) 50 mg tablet Take  by mouth nightly.  pregabalin (LYRICA) 75 mg capsule Take 1 Capsule by mouth two (2) times a day. Max Daily Amount: 150 mg. 60 Capsule 3    omeprazole (PriLOSEC OTC) 20 mg tablet Take 20 mg by mouth daily.  mv-min/iron/folic/calcium/vitK (WOMEN'S MULTIVITAMIN PO) Take  by mouth daily.  naloxone (NARCAN) 4 mg/actuation nasal spray Use 1 spray intranasally, then discard. Repeat with new spray every 2 min as needed for opioid overdose symptoms, alternating nostrils. 1 Each 0    ondansetron hcl (Zofran) 4 mg tablet Take 1 Tablet by mouth every eight (8) hours as needed for Nausea. (Patient not taking: Reported on 3/25/2022) 20 Tablet 0    LORazepam (Ativan) 0.5 mg tablet Take 1 Tablet by mouth nightly as needed for Anxiety. Max Daily Amount: 0.5 mg. (Patient not taking: Reported on 10/26/2021) 30 Tablet 0    ondansetron hcl (ZOFRAN) 4 mg tablet Take 1 Tablet by mouth every eight (8) hours as needed for Nausea or Vomiting.  (Patient not taking: Reported on 7/13/2021) 30 Tablet 2    magic mouthwash solution Magic mouth wash   Maalox  Lidocaine 2% viscous   Diphenhydramine oral solution     Pharmacy to mix equal portions of ingredients to a total volume as indicated in the dispense amount. Indications: stomatitis, a condition with painful swelling and sores inside the mouth, Swish and spit 5-10 mL PRN every 6 hours for mouth sores. (Patient not taking: Reported on 7/13/2021) 250 mL 0    promethazine (PHENERGAN) 25 mg tablet Take 1 Tab by mouth every six (6) hours as needed for Nausea. (Patient not taking: Reported on 3/25/2022) 30 Tab 2    megestroL (MEGACE) 400 mg/10 mL (10 mL) suspension Take 10 mL by mouth two (2) times a day. (Patient not taking: Reported on 7/13/2021) 1 Bottle 3    ondansetron hcl (Zofran) 4 mg tablet Take 1 Tab by mouth every six (6) hours. (Patient not taking: Reported on 12/6/2021) 30 Tab 0    cyclobenzaprine (FLEXERIL) 10 mg tablet Take  by mouth three (3) times daily as needed for Muscle Spasm(s). (Patient not taking: Reported on 7/13/2021)       No current facility-administered medications on file prior to visit. Allergies: Allergies   Allergen Reactions    Codeine Hives       OBJECTIVE:    Vitals:   Visit Vitals  /79 (BP 1 Location: Left upper arm, BP Patient Position: Sitting)   Pulse 97   Temp 97.8 °F (36.6 °C)   Ht 5' 7\" (1.702 m)   Wt 94.8 kg (209 lb)   LMP 01/09/2021 (Approximate)   SpO2 97%   BMI 32.73 kg/m²       Physical Examination:    General:  alert, cooperative, no distress   Abdomen: soft, non-tender   Incision: healed   Pelvic: NEFG, spec exam revealed normal mucosa, no masses. BME revealed no masses   Rectal: not done   Extremity:   extremities normal, atraumatic, no cyanosis or edema     IMPRESSION/PLAN:    Stage IIIBG1 endometrial cancer who is clinically LIZY  The operative procedures and clinical results have been reviewed with the patient. Previously Discussed recommendation for adjuvant chemotherapy  \s/p carbo auc=6, taxol 175 mg/m2 IV every 3 wks x 6 cycles. Caris: BRCA2, MSI-H  Invitae: MSH2 pathogenic c/w park syndrome.  Will refer to GI once complete  Decreased appetite-improved  Chemo induced anemia: resolved  Constipation- senokot, colace-improved  Body aches- percocet prn. Insomnia- ambien prn  Pelvic exam performed today  Neuropathy- given G3 neuropathy  S/p reduce taxol to 135 mg/m2. increase lyrica to 75 mg bid  CT reviewed. No evidence of disease. Plan to repeat in 3 months (6/2022)  Discussed surveillance strategy and signs/symptoms of recurrence  Plan for f/u in 3 months. Referral placed for Dr. Jennyfer Bean as I will be leaving the area      Total time spent was 40 minutes regarding diagnosis of endometrial cancer and >50% of this time was spent counseling and coordinating care.     Luis Armando Guzman DO  Gynecologic Oncology  3/25/2022/12:59 PM

## 2022-03-25 NOTE — PROGRESS NOTES
Mendy Benitez is a 43 y.o. female presents in office for endometrial cancer surveillance. Pt denies any problems w/ bleeding, bm/voiding. Pt reports pain radiating down both legs w/ numbness/tingling on both feet. No chief complaint on file. Visit Vitals  /79 (BP 1 Location: Left upper arm, BP Patient Position: Sitting)   Pulse 97   Temp 97.8 °F (36.6 °C)   Ht 5' 7\" (1.702 m)   Wt 209 lb (94.8 kg)   SpO2 97%   BMI 32.73 kg/m²         1. Have you been to the ER, urgent care clinic since your last visit? Hospitalized since your last visit? no    2. Have you seen or consulted any other health care providers outside of the [address] [place] [address] since your last visit? Include any pap smears or colon screening.  No    3 most recent PHQ Screens 11/3/2021   Little interest or pleasure in doing things Not at all   Feeling down, depressed, irritable, or hopeless Not at all   Total Score PHQ 2 0       Learning Assessment 3/9/2021   PRIMARY LEARNER Patient   BARRIERS PRIMARY LEARNER NONE   CO-LEARNER CAREGIVER No   PRIMARY LANGUAGE ENGLISH   LEARNER PREFERENCE PRIMARY DEMONSTRATION   ANSWERED BY Patient   RELATIONSHIP SELF

## 2022-03-25 NOTE — H&P
Assessment/Plan  # Detail Type Description    1. Assessment Encounter for screening for cancer of colon (Z12.11). Impression Pt Dr. 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 She also has a positive FHx of (CRC) colorectal cancer:  Brother- colon cancer (Dx'd in his 29's) & FHx of Esophageal Cancer: Father (Hx of work exposure to harsh chemicals, per Pt) & FHx of Cervical Cancer: Mother had Cervical cancer that metastasized to her lungs. Asymptomatic of GI complaints. BMI: 33.20, BM: 1x daily. No reported hx of additional abdominal surgeries, strokes, or CAD. Patient Plan -Rx Sent: Reglan (2 doses to be taken 30 minutes prior to each dose of bowel prep to pre-medicate for nausea)  C-scope Plan: (Schedule 1st)  First colonoscopy ordered with Dr. Camelia Yu with Miralax bowel prep, and Mag Citrate 2 days before prep, and Miralax and stool softeners starting 3 days before prep.  -Pt must be off iron supplements at least 7 days prior to procedure. *C-scope Risks:  Stressed importance of following all bowel preparation instructions. Explained the procedure to the patient including all risks and benefits. These risks consist of missed lesions on exam, bleeding, and bowel perforation with possible need for admission to the hospital, and in the most extensive of  circumstances, the patient may require surgery. Pt verbalized understanding of these risks and is agreeable with this procedure    Plan Orders Further diagnostic evaluations ordered today include(s) DIAGNOSTIC COLONOSCOPY to be performed. She will be scheduled for GASTROENTEROLOGY PROCEDURE, Next Lab Date is within 3 month on 03/21/2022.  Clinical information/comments: at location [facility]. The surgeon scheduled is Monte Merlin MD. An assistant has not been requested. 2. Assessment Family history of malignant neoplasm of digestive organ (Z80.0). Impression *FHx of (CRC) colorectal cancer:  -Brother diagnosed with Colon Cancer in his 29's, still Living. He has never been tested for Cornoa Syndrome    *FHx of Esophageal Cancer: Father (Hx of work exposure to harsh chemicals, per Pt)    *FHx of Cervical Cancer: Mother had Cervical cancer that metastasized to her lungs and brain. Plan Orders She will be scheduled for GASTROENTEROLOGY PROCEDURE, Next Lab Date is within 3 month on 03/28/2022. Clinical information/comments: at location [facility]. The surgeon scheduled is Monte Merlin MD. An assistant has not been requested. 3. 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 Impression Genetic alteration have been identified from pathology specimens sent off for genome sequencing. 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 Positive protein expression revealed by IHC of MLH1, as well as PMS2.   ____________________________________  \"Interpretation: A pathogenic frameshift mutation was detected in MSH6. This specific mutation frequently occurs as a somatic mutation in tumors with microsatellite instability due to other causes. It has also been reported as a germline mutation, causal for Corona Syndrome. \"   (Results date 2/10/2021 of Genetic Testing Document: Appendix 6 of 16)    *Aside from herself, and her Brother; Pt denies any prior generational hx of Coroan-related cancers: i.e.) Colorectal cancer, Gastric, Pancreatic, Urinary Tract, Prostatic, Uterine, and Ovarian  ____________________________________  *She has never had an Endoscopy, or a Colonoscopy before. We need to do baseline EGD and Colonoscopy exams now, and start her on routine interval of Q3 year EGD & C-scopes moving forward for Corona surveillance.      *Recommend Early Family screening for her children, and her Brother's children. (Also, recommend her Brother be tested for Corona Syndrome, in light of his colon cancer history). Patient Plan *EGD Plan: (Schedule 2nd)  Will proceed with EGD with Dr. Nereida Gonzalez, to evaluate upper GI tract for abnormalities, evidence Upper GI malignancies, and Sanchez's epithelium with biopsies, polypectomy, or dilation as indicated. -Pt must be off iron supplements at least 7 days prior to procedure. *EGD Risks:  Explained risks of procedure to include bleeding, infection, reaction to sedation, and perforation with possible need for admission to the hospital, and in the most extensive of  circumstances, the patient may require surgery. Pt verbalized understanding of these risks and is agreeable with this procedure    Plan Orders Further diagnostic evaluations ordered today include(s) UPPER GI ENDOSCOPY, DIAGNOSIS to be performed. 4. Assessment Malignant neoplasm of endometrium (C54.1). Impression Diagnosed with Stage IIIBG1 Endometrial cancer, s/p Robotic-assisted Total lap. Hysterectomy, BSO, upper vaginectomy, bilateral pelvic and para-aortic lymph node dissection w/peritoneal biopsies and washings, then cystoscopy done on 1/14/2021  by Dr. Bess Tripp ([address] [facility]. [facility]) @[facility]. Followed by Chemotherapy, completing cycle #6 on 6/22/2021. (No Radiation required). Negative CT scan of Abdomen & Pelvis w/Contrast dated 7/8/2021 @[facility]. Patient Plan See Above              This 39year old  patient was referred by Jackeline Zamora. This 39year old female presents for Colon Cancer Screening. History of Present Illness  1. Colon Cancer Screening   No prior screening. Risk Factors: family history - sibling and Brother- colon cancer.   Pertinent negatives include abdominal pain, change in bowel habits, change in stool caliber, constipation, decreased appetite, diarrhea, melena, nausea, rectal bleeding, vomiting, weight gain and weight loss. Additional information: Patient has family history of colon cancer and BM: 1x daily. Pt stated she had endometrial cancer. Problem List  Problem Description Onset Date Chronic Clinical Status Notes   Hereditary non-polyposis colon cancer gene mutation positive 2021 N     Family history of hereditary nonpolyposis colon cancer 2021 N     Screening for malignant neoplasm of colon done 2021 N       Past Medical/Surgical History   (Detailed)  Disease/disorder Onset Date Management Date Comments   Endometrial Cancer (Tumor: + Corona Syndrome)  Total Lap. Hysterectomy w/BSO, vaginectomy (2021 @[facility]) - Dr. Slick Lundy; Chemotherapy (No Radiation)           Family History   (Detailed)    Relationship Family Member Name  Age at Death Condition Onset Age Cause of Death   Brother    Colon Cancer (in his 29's): Needs Corona Testing  N   Father  Y  Esophageal Cancer (Work exposure to harsh Chemicals)  Y   Mother  Y  Metastatic Cervical Cancer (spread to lungs, then Brain)  Y     Social History  (Detailed)  Tobacco use reviewed. Preferred language is [place]. Marital Status/Family/Social Support  Marital status:      Tobacco use status: Current non-smoker. Smoking status: Never smoker. Tobacco Screening  Patient has never used tobacco. Patient has not used tobacco in the last 30 days. Patient has not used smokeless tobacco in the last 30 days. Smoking Status  Type Smoking Status Usage Per Day Years Used Pack Years Total Pack Years    Never smoker         Alcohol  There is no history of alcohol use. Caffeine  The patient uses caffeine: coffee.       Medications (active prior to today)  Medication Instructions Start Date Stop Date Refilled Elsewhere   multivitamin tablet  //   Y   Lyrica 75 mg capsule take 1 capsule by oral route 2 times every day //   Y   trazodone 50 mg tablet take 1 tablet by oral route  every day at bedtime //   Y lidocaine HCl 3 % topical cream apply by topical route 2 times every day to the affected area(s) //   Y     Patient Status   Completed with information received for patient in a summary of care record. Medication Reconciliation  Medications reconciled today. Medication Reviewed  Adherence Medication Name Sig Desc Elsewhere Status   taking as directed lidocaine HCl 3 % topical cream apply by topical route 2 times every day to the affected area(s) Y Verified   taking as directed trazodone 50 mg tablet take 1 tablet by oral route  every day at bedtime Y Verified   taking as directed Lyrica 75 mg capsule take 1 capsule by oral route 2 times every day Y Verified   taking as directed multivitamin tablet  Y Verified     Medications (Added, Continued or Stopped today)  Start Date Medication Directions PRN Status PRN Reason Instruction Stop Date    lidocaine HCl 3 % topical cream apply by topical route 2 times every day to the affected area(s) N       Lyrica 75 mg capsule take 1 capsule by oral route 2 times every day N       multivitamin tablet  N      11/30/2021 Reglan 10 mg tablet take 1 tablet PO 30 minutes before each part of colonoscopy prep N       trazodone 50 mg tablet take 1 tablet by oral route  every day at bedtime N        Allergies  Ingredient Reaction (Severity) Medication Name Comment   BOWEL PREP COMBINATION NO. 1      BOWEL PREP COMBINATION NO.2, TWO PART PREP      CODEINE        Reviewed, updated. Orders  Status Lab Order Time Frame Comments   ordered DIAGNOSTIC COLONOSCOPY     ordered UPPER GI ENDOSCOPY, DIAGNOSIS     ordered GASTROENTEROLOGY PROCEDURE within 3 month at location [address] surgeon scheduled is Krista Benavidez MD. An assistant has not been requested. ordered GASTROENTEROLOGY PROCEDURE within 3 month at location [address] surgeon scheduled is Krista Benavidez MD. An assistant has not been requested.        Review of Systems  System Neg/Pos Details   Constitutional Negative Fever, Weight gain and Weight loss. ENMT Negative Sinus Infection. Eyes Negative Double vision. Respiratory Negative Asthma, Chronic cough and Dyspnea. Cardio Negative Chest pain, Edema and Irregular heartbeat/palpitations. GI Negative Abdominal pain, Change in bowel habits, Change in stool caliber, Constipation, Decreased appetite, Diarrhea, Dysphagia, Heartburn, Hematemesis, Hematochezia, Melena, Nausea, Rectal bleeding, Reflux and Vomiting.  Negative Dysuria and Hematuria. Endocrine Negative Cold intolerance and Heat intolerance. Neuro Negative Dizziness, Headache, Numbness and Tremors. Psych Negative Anxiety, Depression and Increased stress. Integumentary Negative Hives, Pruritus and Rash. MS Negative Back pain, Joint pain and Myalgia. Hema/Lymph Negative Easy bleeding, Easy bruising and Lymphadenopathy. Allergic/Immuno Negative Food allergies and Immunosuppression. Vital Signs   Height  Time ft in cm Last Measured Height Position   12:55 PM 5.0 7.00 170.18 11/30/2021      Weight/BSA/BMI  Time lb oz kg Context BMI kg/m2 BSA m2   12:55 .00  96.162  33.20 2.13     Date/Time Temp Pulse BP Cardiac Rhythm [facility]   03/28/22 1216 97.7 °F (36.5 °C) 101 Abnormal  112/72 --        Respiratory Therapy (last day)    Date/Time Resp SpO2 O2 Device O2 Flow Rate (L/min) [facility]   03/28/22 1216 16 100 % None (Room air) -- [facility]       Physical  Exam  Exam Findings Details   Female GI Quick Visit Comments Short Frame. Constitutional Normal Well developed. Eyes Normal Conjunctiva - Right: Normal, Left: Normal. Sclera - Right: Normal, Left: Normal.   Nasopharynx Normal Lips/teeth/gums - Normal.   Neck Exam Normal Inspection - Normal.   Respiratory Normal Inspection - Normal.   Cardiovascular Normal Rhythm - Regular. Extra sounds - None. Murmurs - None.    Vascular Normal Pulses - Brachial: Normal.   Skin Normal Inspection - Normal.   Musculoskeletal Normal Hands/Wrist - Right: Normal, Left: Normal.   Extremity Normal No edema. Neurological Normal Fine motor skills - Normal.   Psychiatric Normal Orientation - Oriented to time, place, person & situation. Appropriate mood and affect. Patient Education  # Patient Education   1.  Colon Cancer Screening: Care Instructions         Active Patient Care Team Members  Name Contact Agency Type Support Role Relationship Active Date Inactive Date Specialty   [facility]   Emergency Contact Spouse      Celine So   encounter provider    Gastroenterology       No change in H&P

## 2022-03-25 NOTE — LETTER
3/25/2022    Patient: Rivas Flores   YOB: 1980   Date of Visit: 3/25/2022     Tomas Alanis DO  [address] [place] [facility] [address]  Via Fax: [phone]    Dear Tomas Alanis DO,      Thank you for referring Ms. Tianna Palmer to [place] for evaluation. My notes for this consultation are attached. If you have questions, please do not hesitate to call me. I look forward to following your patient along with you.       Sincerely,    Tiffanie Ferrell MD

## 2022-03-28 ENCOUNTER — HOSPITAL ENCOUNTER (OUTPATIENT)
Age: 42
Setting detail: OUTPATIENT SURGERY
Discharge: HOME | End: 2022-03-28
Attending: INTERNAL MEDICINE | Admitting: INTERNAL MEDICINE
Payer: COMMERCIAL

## 2022-03-28 VITALS
WEIGHT: 210 LBS | RESPIRATION RATE: 16 BRPM | DIASTOLIC BLOOD PRESSURE: 64 MMHG | SYSTOLIC BLOOD PRESSURE: 109 MMHG | TEMPERATURE: 97.4 F | HEIGHT: 67 IN | OXYGEN SATURATION: 100 % | HEART RATE: 99 BPM | BODY MASS INDEX: 32.96 KG/M2

## 2022-03-28 PROCEDURE — 77030039961 HC KT CUST COLON BSC -D: Performed by: INTERNAL MEDICINE

## 2022-03-28 PROCEDURE — 74011250636 HC RX REV CODE- 250/636: Performed by: INTERNAL MEDICINE

## 2022-03-28 PROCEDURE — 2709999900 HC NON-CHARGEABLE SUPPLY: Performed by: INTERNAL MEDICINE

## 2022-03-28 PROCEDURE — G0500 MOD SEDAT ENDO SERVICE >5YRS: HCPCS | Performed by: INTERNAL MEDICINE

## 2022-03-28 PROCEDURE — 76040000007: Performed by: INTERNAL MEDICINE

## 2022-03-28 PROCEDURE — 77030040361 HC SLV COMPR DVT MDII -B: Performed by: INTERNAL MEDICINE

## 2022-03-28 RX ORDER — SODIUM CHLORIDE 9 MG/ML
1000 INJECTION, SOLUTION INTRAVENOUS CONTINUOUS
Status: DISCONTINUED | OUTPATIENT
Start: 2022-03-28 | End: 2022-03-28 | Stop reason: HOSPADM

## 2022-03-28 RX ORDER — FLUMAZENIL 0.1 MG/ML
0.2 INJECTION INTRAVENOUS
Status: DISCONTINUED | OUTPATIENT
Start: 2022-03-28 | End: 2022-03-28 | Stop reason: HOSPADM

## 2022-03-28 RX ORDER — FENTANYL CITRATE 50 UG/ML
100 INJECTION, SOLUTION INTRAMUSCULAR; INTRAVENOUS
Status: DISCONTINUED | OUTPATIENT
Start: 2022-03-28 | End: 2022-03-28 | Stop reason: HOSPADM

## 2022-03-28 RX ORDER — MIDAZOLAM HYDROCHLORIDE 1 MG/ML
.25-5 INJECTION, SOLUTION INTRAMUSCULAR; INTRAVENOUS
Status: DISCONTINUED | OUTPATIENT
Start: 2022-03-28 | End: 2022-03-28 | Stop reason: HOSPADM

## 2022-03-28 RX ORDER — NALOXONE HYDROCHLORIDE 0.4 MG/ML
0.4 INJECTION, SOLUTION INTRAMUSCULAR; INTRAVENOUS; SUBCUTANEOUS
Status: DISCONTINUED | OUTPATIENT
Start: 2022-03-28 | End: 2022-03-28 | Stop reason: HOSPADM

## 2022-03-28 RX ADMIN — SODIUM CHLORIDE 1000 ML: 9 INJECTION, SOLUTION INTRAVENOUS at 12:32

## 2022-03-28 NOTE — DISCHARGE INSTRUCTIONS
Isiah Hobbs  [id number]  1980    EGD DISCHARGE INSTRUCTIONS  Discomfort:  Sore throat- throat lozenges or warm salt water gargle  redness at IV site- apply warm compress to area; if redness or soreness persist- contact your physician  Gaseous discomfort- walking, belching will help relieve any discomfort  You may not operate a vehicle until the next day  You may not engage in an occupation involving machinery or appliances until the next day  You may not drink alcoholic beverages until the next day  Avoid making any critical decisions for at least 24 hour    DIET   You may not resume your regular diet. Antireflux diet. ACTIVITY  You may not resume your normal daily activities   Spend the remainder of the day resting -  avoid any strenuous activity. CALL M.D. ANY SIGN OF   Increasing pain, nausea, vomiting  Abdominal distension (swelling)  New increased bleeding (oral or rectal)  Fever (chills)  Pain in chest area  Bloody discharge from nose or mouth  Shortness of breath     You may not take any Advil, Aspirin, Ibuprofen, Motrin, Aleve, or Goodys preferably ONLY  Tylenol as needed for pain. Follow-up Instructions: Follow-up in the office as scheduled or make a follow-up appointment in 2 weeks. Cierra Ramírez MD  March 28, 2022     DISCHARGE SUMMARY from Nurse    PATIENT INSTRUCTIONS:    After general anesthesia or intravenous sedation, for 24 hours or while taking prescription Narcotics:  · Limit your activities  · Do not drive and operate hazardous machinery  · Do not make important personal or business decisions  · Do  not drink alcoholic beverages  · If you have not urinated within 8 hours after discharge, please contact your surgeon on call.     Report the following to your surgeon:  · Excessive pain, swelling, redness or odor of or around the surgical area  · Temperature over 100.5  · Nausea and vomiting lasting longer than 4 hours or if unable to take medications  · Any signs of decreased circulation or nerve impairment to extremity: change in color, persistent  numbness, tingling, coldness or increase pain  · Any questions    What to do at Home:  Recommended activity: {discharge activity:01940}, as above    If you experience any of the following symptoms as above, please follow up with Dr Marina Schaeffer. *  Please give a list of your current medications to your Primary Care Provider. *  Please update this list whenever your medications are discontinued, doses are      changed, or new medications (including over-the-counter products) are added. *  Please carry medication information at all times in case of emergency situations. These are general instructions for a healthy lifestyle:    No smoking/ No tobacco products/ Avoid exposure to second hand smoke  Surgeon General's Warning:  Quitting smoking now greatly reduces serious risk to your health. Obesity, smoking, and sedentary lifestyle greatly increases your risk for illness    A healthy diet, regular physical exercise & weight monitoring are important for maintaining a healthy lifestyle    You may be retaining fluid if you have a history of heart failure or if you experience any of the following symptoms:  Weight gain of 3 pounds or more overnight or 5 pounds in a week, increased swelling in our hands or feet or shortness of breath while lying flat in bed. Please call your doctor as soon as you notice any of these symptoms; do not wait until your next office visit. The discharge information has been reviewed with the patient and spouse. The patient and spouse verbalized understanding. Discharge medications reviewed with the patient and spouse and appropriate educational materials and side effects teaching were provided.   ___________________________________________________________________________________________________________________________________

## 2022-03-28 NOTE — PROCEDURES
(EGD) Esophagogastroduodenoscopy (UPPER ENDOSCOPY) Procedure Note  [facility] [place]  __________________________________________________________________________________________________________________________      3/28/2022     Patient: Ronnell Bates YOB: 1980 Gender: female Age: 43 y.o. INDICATION:  Pt Dr. 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 She also has a positive FHx of (CRC) colorectal cancer:  Brother- colon cancer (Dx'd in his 29's) & FHx of Esophageal Cancer: Father (Hx of work exposure to harsh chemicals, per Pt) & FHx of Cervical Cancer: Mother had Cervical cancer that metastasized to her lungs. Asymptomatic of GI complaints. BMI: 33.20, BM: 1x daily. Colonoscopy on 3/21/ 2022 Tortuous colon and hepatic flexure. Take Omeprazole daily for the last year because of severe heartburns. She has frequent belching. No recent nausea or vomiting. No dysphagia. She gained weight over the last year. She denied taking NSAID's, smoking or drinking alcohol. : Caryn Bojorquez MD    Referring Provider:  Reed Irving DO    Sedation:  Versed 9 mg IV, Fentanyl 100 mcg IV. Procedure Details:  After infomed consent was obtained for the procedure, with all risks and benefits of procedure explained to the family the patient was taken to the endoscopy suite and placed in the left lateral decubitus position. Following sequential administration of sedation as per above, the endoscope was inserted into the mouth and advanced under direct vision to third portion of the duodenum. A careful inspection was made as the gastroscope was withdrawn, including a retroflexed view of the proximal stomach; findings and interventions are described below.       OROPHARYNX: The vocal Cords and the larynx are normal.   ESOPHAGUS: The proximal, mid, and distal oesophagus are normal. The Z-Line is intact located at: 32 to 34 cm. Large sliding 7 to 5 cm Hiatal hernia. Diaphragmatic opening is large, located at 39 cm. STOMACH: No evidence of blood, fluid or solid food retention. The fundus on antegrade and retroflex views is normal. Few medium sized plaques of intestinal metaplasia in the proximal stomach. A couple of small benign gastric polyps in the proximal gastric body. Large hiatal hernia with large diaphragmatic hiatus. The cardia, lesser curvature, greater curvature, the antrum, and pylorus are normal. The gastric mucosa is normal.  DUODENUM: The bulb, second, third portions and major papilla are normal. There is diffuse benign capillary lymphatic ectasia with numerous tiny white spots throughout the duodenum from proximal D2 downward. PROXIMAL JEJUNUM:  Not examined. Therapies:  Nil    Specimen: none           Complications:   None    EBL:  Nil. IMPLANTS: * No implants in log *  IMPRESSION: Large sliding 7 to 5 cm Hiatal hernia. Diaphragmatic opening is large, located around 39 cm. Few medium sized plaques of intestinal metaplasia in the proximal stomach. A couple of small benign gastric polyps in the proximal gastric body. There is diffuse benign capillary lymphatic ectasia with numerous tiny white spots throughout the duodenum from proximal D2 downward. RECOMMENDATION:  May resume antireflux diet. It is better to Avoid NSAID's. Avoid eating for 3 hours before reclining. Make a FU appointment at the office. continue taking Omeprazole 20 mg daily. Consider antireflux surgery for long term management of GERD.  Repeat EGD in 5 years     Assistant: None    --Marlon Denny MD on 3/28/2022 at 1:20 PM

## 2022-04-18 ENCOUNTER — HOSPITAL ENCOUNTER (OUTPATIENT)
Dept: INFUSION THERAPY | Age: 42
Discharge: HOME | End: 2022-04-18
Payer: COMMERCIAL

## 2022-04-18 VITALS
RESPIRATION RATE: 16 BRPM | DIASTOLIC BLOOD PRESSURE: 75 MMHG | HEART RATE: 76 BPM | OXYGEN SATURATION: 98 % | TEMPERATURE: 98.2 F | SYSTOLIC BLOOD PRESSURE: 120 MMHG

## 2022-04-18 PROCEDURE — 74011000250 HC RX REV CODE- 250: Performed by: OBSTETRICS & GYNECOLOGY

## 2022-04-18 PROCEDURE — 96523 IRRIG DRUG DELIVERY DEVICE: CPT

## 2022-04-18 PROCEDURE — 74011250636 HC RX REV CODE- 250/636: Performed by: OBSTETRICS & GYNECOLOGY

## 2022-04-18 PROCEDURE — 77030012965 HC NDL HUBR BBMI -A

## 2022-04-18 RX ORDER — HEPARIN 100 UNIT/ML
500 SYRINGE INTRAVENOUS ONCE
Status: COMPLETED | OUTPATIENT
Start: 2022-04-18 | End: 2022-04-18

## 2022-04-18 RX ORDER — SODIUM CHLORIDE 0.9 % (FLUSH) 0.9 %
5-10 SYRINGE (ML) INJECTION AS NEEDED
Status: DISCONTINUED | OUTPATIENT
Start: 2022-04-18 | End: 2022-04-20 | Stop reason: HOSPADM

## 2022-04-18 RX ADMIN — Medication 10 ML: at 09:58

## 2022-04-18 RX ADMIN — HEPARIN 500 UNITS: 100 SYRINGE at 09:58

## 2022-04-18 NOTE — PROGRESS NOTES
[facility] Progress Note    Date: 2022    Name: Argenis Arizmendi    MRN: [id number]         : 1980    Port Flush/CA-125    Ms. Ember Judge was assessed and education was provided. PT denies any complaints today. Ms. Enrique's vitals were reviewed. Visit Vitals  /75 (BP 1 Location: Left arm, BP Patient Position: Sitting)   Pulse 76   Temp 98.2 °F (36.8 °C)   Resp 16   SpO2 98%   Breastfeeding No     Port accessed per protocol, flushes easily with brisk blood return. After wasting 10mls, blood lab drawn (ca-125) and sent to lab. Line flushed with 20ml NS and 500u heparin and de-accessed. Band aid applied to site. Ms. Ember Judge tolerated well, and had no complaints. Patient armband removed and shredded. Ms. Ember Judge was discharged from [place] [id number] in stable condition at 1000. She is to return on 22 at 1000 for her next port flush appointment.     Karuna Dixon RN  2022

## 2022-05-31 ENCOUNTER — HOSPITAL ENCOUNTER (OUTPATIENT)
Dept: INFUSION THERAPY | Age: 42
End: 2022-05-31

## 2023-11-22 ENCOUNTER — APPOINTMENT (OUTPATIENT)
Facility: HOSPITAL | Age: 43
End: 2023-11-22
Payer: MEDICARE

## 2023-11-22 ENCOUNTER — HOSPITAL ENCOUNTER (EMERGENCY)
Facility: HOSPITAL | Age: 43
Discharge: HOME | End: 2023-11-23
Attending: EMERGENCY MEDICINE
Payer: MEDICARE

## 2023-11-22 DIAGNOSIS — R10.31 RIGHT LOWER QUADRANT ABDOMINAL PAIN: Primary | ICD-10-CM

## 2023-11-22 LAB
ALBUMIN SERPL-MCNC: 3.6 G/DL (ref 3.4–5)
ALBUMIN/GLOB SERPL: 1 (ref 0.8–1.7)
ALP SERPL-CCNC: 145 U/L (ref 45–117)
ALT SERPL-CCNC: 21 U/L (ref 13–56)
ANION GAP SERPL CALC-SCNC: 7 MMOL/L (ref 3–18)
APPEARANCE UR: ABNORMAL
AST SERPL-CCNC: 21 U/L (ref 10–38)
BASOPHILS # BLD: 0.1 K/UL (ref 0–0.1)
BASOPHILS NFR BLD: 1 % (ref 0–2)
BILIRUB SERPL-MCNC: 0.2 MG/DL (ref 0.2–1)
BILIRUB UR QL: NEGATIVE
BUN SERPL-MCNC: 13 MG/DL (ref 7–18)
BUN/CREAT SERPL: 12 (ref 12–20)
CALCIUM SERPL-MCNC: 8.9 MG/DL (ref 8.5–10.1)
CHLORIDE SERPL-SCNC: 109 MMOL/L (ref 100–111)
CO2 SERPL-SCNC: 26 MMOL/L (ref 21–32)
COLOR UR: YELLOW
CREAT SERPL-MCNC: 1.07 MG/DL (ref 0.6–1.3)
DIFFERENTIAL METHOD BLD: ABNORMAL
EOSINOPHIL # BLD: 0.2 K/UL (ref 0–0.4)
EOSINOPHIL NFR BLD: 2 % (ref 0–5)
ERYTHROCYTE [DISTWIDTH] IN BLOOD BY AUTOMATED COUNT: 16.7 % (ref 11.6–14.5)
GLOBULIN SER CALC-MCNC: 3.7 G/DL (ref 2–4)
GLUCOSE SERPL-MCNC: 105 MG/DL (ref 74–99)
GLUCOSE UR STRIP.AUTO-MCNC: NEGATIVE MG/DL
HCT VFR BLD AUTO: 35.7 % (ref 35–45)
HGB BLD-MCNC: 10.6 G/DL (ref 12–16)
HGB UR QL STRIP: NEGATIVE
IMM GRANULOCYTES # BLD AUTO: 0 K/UL (ref 0–0.04)
IMM GRANULOCYTES NFR BLD AUTO: 0 % (ref 0–0.5)
KETONES UR QL STRIP.AUTO: ABNORMAL MG/DL
LEUKOCYTE ESTERASE UR QL STRIP.AUTO: NEGATIVE
LIPASE SERPL-CCNC: 36 U/L (ref 13–75)
LYMPHOCYTES # BLD: 2.6 K/UL (ref 0.9–3.6)
LYMPHOCYTES NFR BLD: 22 % (ref 21–52)
MCH RBC QN AUTO: 22.5 PG (ref 24–34)
MCHC RBC AUTO-ENTMCNC: 29.7 G/DL (ref 31–37)
MCV RBC AUTO: 75.6 FL (ref 78–100)
MONOCYTES # BLD: 0.7 K/UL (ref 0.05–1.2)
MONOCYTES NFR BLD: 6 % (ref 3–10)
NEUTS SEG # BLD: 8.6 K/UL (ref 1.8–8)
NEUTS SEG NFR BLD: 70 % (ref 40–73)
NITRITE UR QL STRIP.AUTO: NEGATIVE
NRBC # BLD: 0 K/UL (ref 0–0.01)
NRBC BLD-RTO: 0 PER 100 WBC
PH UR STRIP: 6.5 (ref 5–8)
PLATELET # BLD AUTO: 325 K/UL (ref 135–420)
PMV BLD AUTO: 11.5 FL (ref 9.2–11.8)
POTASSIUM SERPL-SCNC: 4 MMOL/L (ref 3.5–5.5)
PROT SERPL-MCNC: 7.3 G/DL (ref 6.4–8.2)
PROT UR STRIP-MCNC: NEGATIVE MG/DL
RBC # BLD AUTO: 4.72 M/UL (ref 4.2–5.3)
SODIUM SERPL-SCNC: 142 MMOL/L (ref 136–145)
SP GR UR REFRACTOMETRY: 1.02 (ref 1–1.03)
UROBILINOGEN UR QL STRIP.AUTO: 1 EU/DL (ref 0.2–1)
WBC # BLD AUTO: 12.3 K/UL (ref 4.6–13.2)

## 2023-11-22 PROCEDURE — 99285 EMERGENCY DEPT VISIT HI MDM: CPT

## 2023-11-22 PROCEDURE — 6360000004 HC RX CONTRAST MEDICATION: Performed by: EMERGENCY MEDICINE

## 2023-11-22 PROCEDURE — 2580000003 HC RX 258: Performed by: EMERGENCY MEDICINE

## 2023-11-22 PROCEDURE — 85025 COMPLETE CBC W/AUTO DIFF WBC: CPT

## 2023-11-22 PROCEDURE — 74177 CT ABD & PELVIS W/CONTRAST: CPT

## 2023-11-22 PROCEDURE — 83690 ASSAY OF LIPASE: CPT

## 2023-11-22 PROCEDURE — 81003 URINALYSIS AUTO W/O SCOPE: CPT

## 2023-11-22 PROCEDURE — 96374 THER/PROPH/DIAG INJ IV PUSH: CPT

## 2023-11-22 PROCEDURE — 6360000002 HC RX W HCPCS: Performed by: EMERGENCY MEDICINE

## 2023-11-22 PROCEDURE — 6370000000 HC RX 637 (ALT 250 FOR IP): Performed by: EMERGENCY MEDICINE

## 2023-11-22 PROCEDURE — 80053 COMPREHEN METABOLIC PANEL: CPT

## 2023-11-22 PROCEDURE — 96375 TX/PRO/DX INJ NEW DRUG ADDON: CPT

## 2023-11-22 RX ORDER — KETOROLAC TROMETHAMINE 15 MG/ML
30 INJECTION, SOLUTION INTRAMUSCULAR; INTRAVENOUS
Status: COMPLETED | OUTPATIENT
Start: 2023-11-22 | End: 2023-11-22

## 2023-11-22 RX ORDER — DICYCLOMINE HCL 20 MG
20 TABLET ORAL
Status: COMPLETED | OUTPATIENT
Start: 2023-11-22 | End: 2023-11-22

## 2023-11-22 RX ORDER — 0.9 % SODIUM CHLORIDE 0.9 %
1000 INTRAVENOUS SOLUTION INTRAVENOUS ONCE
Status: COMPLETED | OUTPATIENT
Start: 2023-11-22 | End: 2023-11-23

## 2023-11-22 RX ORDER — ONDANSETRON 2 MG/ML
4 INJECTION INTRAMUSCULAR; INTRAVENOUS
Status: COMPLETED | OUTPATIENT
Start: 2023-11-22 | End: 2023-11-22

## 2023-11-22 RX ADMIN — DICYCLOMINE HYDROCHLORIDE 20 MG: 20 TABLET ORAL at 22:22

## 2023-11-22 RX ADMIN — KETOROLAC TROMETHAMINE 30 MG: 15 INJECTION, SOLUTION INTRAMUSCULAR; INTRAVENOUS at 22:22

## 2023-11-22 RX ADMIN — ONDANSETRON 4 MG: 2 INJECTION INTRAMUSCULAR; INTRAVENOUS at 22:23

## 2023-11-22 RX ADMIN — IOPAMIDOL 100 ML: 612 INJECTION, SOLUTION INTRAVENOUS at 22:40

## 2023-11-22 RX ADMIN — SODIUM CHLORIDE 1000 ML: 9 INJECTION, SOLUTION INTRAVENOUS at 22:23

## 2023-11-22 ASSESSMENT — PAIN DESCRIPTION - LOCATION: LOCATION: ABDOMEN

## 2023-11-22 ASSESSMENT — PAIN DESCRIPTION - ORIENTATION: ORIENTATION: RIGHT;LOWER

## 2023-11-22 ASSESSMENT — PAIN - FUNCTIONAL ASSESSMENT: PAIN_FUNCTIONAL_ASSESSMENT: 0-10

## 2023-11-22 ASSESSMENT — PAIN SCALES - GENERAL: PAINLEVEL_OUTOF10: 9

## 2023-11-23 VITALS
SYSTOLIC BLOOD PRESSURE: 115 MMHG | DIASTOLIC BLOOD PRESSURE: 62 MMHG | TEMPERATURE: 98.2 F | WEIGHT: 214 LBS | HEART RATE: 87 BPM | RESPIRATION RATE: 18 BRPM | OXYGEN SATURATION: 98 % | HEIGHT: 67 IN | BODY MASS INDEX: 33.59 KG/M2

## 2023-11-23 RX ORDER — ALUMINUM HYDROXIDE, MAGNESIUM HYDROXIDE, SIMETHICONE 400; 400; 40 MG/10ML; MG/10ML; MG/10ML
30 SUSPENSION ORAL
Qty: 355 ML | Refills: 0 | Status: SHIPPED | OUTPATIENT
Start: 2023-11-23

## 2023-11-23 RX ORDER — KETOROLAC TROMETHAMINE 10 MG/1
10 TABLET, FILM COATED ORAL EVERY 6 HOURS PRN
Qty: 20 TABLET | Refills: 0 | Status: SHIPPED | OUTPATIENT
Start: 2023-11-23

## 2023-11-23 RX ORDER — DICYCLOMINE HCL 20 MG
20 TABLET ORAL 3 TIMES DAILY PRN
Qty: 60 TABLET | Refills: 0 | Status: SHIPPED | OUTPATIENT
Start: 2023-11-23

## 2023-11-23 ASSESSMENT — PAIN SCALES - GENERAL: PAINLEVEL_OUTOF10: 7

## 2023-11-23 ASSESSMENT — PAIN DESCRIPTION - LOCATION: LOCATION: ABDOMEN

## 2023-11-23 NOTE — ED NOTES
Patient with complaint of RLQ abdominal pain radiating to umbilicus and right back x1 day. History of cancer and previous appendectomy. Denies N/V currently but had an episode of vomiting earlier today. No issues with bowels/urination. Presents Aox4, in no acute distress. Abdomen with no deformities, tender to touch, no rebound. Checked on needs, call light given, awaiting MD examination.       Brian Zavala RN  11/22/23 5800

## 2023-12-10 PROBLEM — R56.9 SEIZURES (HCC): Status: ACTIVE | Noted: 2023-12-10

## 2023-12-10 PROBLEM — G47.10 HYPERSOMNOLENCE: Status: ACTIVE | Noted: 2022-06-26

## 2023-12-10 PROBLEM — G62.0 DRUG-INDUCED POLYNEUROPATHY (HCC): Status: ACTIVE | Noted: 2021-10-27

## 2023-12-10 PROBLEM — K21.9 GASTROESOPHAGEAL REFLUX DISEASE: Status: ACTIVE | Noted: 2017-05-02

## 2023-12-10 PROBLEM — G47.9 DIFFICULTY SLEEPING: Status: ACTIVE | Noted: 2021-10-27

## 2023-12-10 PROBLEM — E66.9 OBESITY (BMI 30.0-34.9): Status: ACTIVE | Noted: 2023-04-03

## 2023-12-10 PROBLEM — N92.0 MENORRHAGIA: Status: ACTIVE | Noted: 2017-02-06

## 2023-12-10 PROBLEM — Z15.09 HEREDITARY NONPOLYPOSIS COLORECTAL CANCER (HNPCC) SYNDROME: Status: ACTIVE | Noted: 2021-12-02

## 2023-12-10 PROBLEM — Z15.09: Status: ACTIVE | Noted: 2021-11-30

## 2023-12-10 PROBLEM — E66.811 OBESITY (BMI 30.0-34.9): Status: ACTIVE | Noted: 2023-04-03

## 2023-12-10 PROBLEM — G47.33 OSA (OBSTRUCTIVE SLEEP APNEA): Status: ACTIVE | Noted: 2022-12-28

## 2023-12-10 PROBLEM — R10.11 RUQ ABDOMINAL PAIN: Status: ACTIVE | Noted: 2023-08-08

## 2024-08-01 NOTE — TELEPHONE ENCOUNTER
Spoke with patient in [facility] to relay recent  results of 19, WNL. Patient requested a refill of her Percocet stating she is low and will run out before the end of the weekend.
Yes

## 2025-06-14 ENCOUNTER — APPOINTMENT (OUTPATIENT)
Facility: HOSPITAL | Age: 45
End: 2025-06-14
Payer: MEDICARE

## 2025-06-14 ENCOUNTER — HOSPITAL ENCOUNTER (EMERGENCY)
Facility: HOSPITAL | Age: 45
Discharge: HOME | End: 2025-06-15
Attending: EMERGENCY MEDICINE
Payer: MEDICARE

## 2025-06-14 VITALS
BODY MASS INDEX: 36.1 KG/M2 | WEIGHT: 230 LBS | TEMPERATURE: 98.2 F | OXYGEN SATURATION: 100 % | RESPIRATION RATE: 15 BRPM | HEART RATE: 105 BPM | DIASTOLIC BLOOD PRESSURE: 108 MMHG | HEIGHT: 67 IN | SYSTOLIC BLOOD PRESSURE: 139 MMHG

## 2025-06-14 DIAGNOSIS — R11.2 NAUSEA AND VOMITING, UNSPECIFIED VOMITING TYPE: ICD-10-CM

## 2025-06-14 DIAGNOSIS — R10.13 EPIGASTRIC PAIN: Primary | ICD-10-CM

## 2025-06-14 LAB
ALBUMIN SERPL-MCNC: 3.6 G/DL (ref 3.4–5)
ALBUMIN/GLOB SERPL: 1.2 (ref 0.8–1.7)
ALP SERPL-CCNC: 130 U/L (ref 45–117)
ALT SERPL-CCNC: 38 U/L (ref 10–35)
ANION GAP SERPL CALC-SCNC: 12 MMOL/L (ref 3–18)
APPEARANCE UR: ABNORMAL
AST SERPL-CCNC: 33 U/L (ref 10–38)
BACTERIA URNS QL MICRO: ABNORMAL /HPF
BASOPHILS # BLD: 0.07 K/UL (ref 0–0.1)
BASOPHILS NFR BLD: 0.6 % (ref 0–2)
BILIRUB SERPL-MCNC: 0.3 MG/DL (ref 0.2–1)
BILIRUB UR QL: NEGATIVE
BUN SERPL-MCNC: 11 MG/DL (ref 6–23)
BUN/CREAT SERPL: 11 (ref 12–20)
CALCIUM SERPL-MCNC: 9.6 MG/DL (ref 8.5–10.1)
CHLORIDE SERPL-SCNC: 105 MMOL/L (ref 98–107)
CO2 SERPL-SCNC: 26 MMOL/L (ref 21–32)
COLOR UR: YELLOW
CREAT SERPL-MCNC: 1 MG/DL (ref 0.6–1.3)
DIFFERENTIAL METHOD BLD: ABNORMAL
EOSINOPHIL # BLD: 0.16 K/UL (ref 0–0.4)
EOSINOPHIL NFR BLD: 1.5 % (ref 0–5)
EPITH CASTS URNS QL MICRO: ABNORMAL /LPF (ref 0–5)
ERYTHROCYTE [DISTWIDTH] IN BLOOD BY AUTOMATED COUNT: 13 % (ref 11.6–14.5)
GLOBULIN SER CALC-MCNC: 3 G/DL (ref 2–4)
GLUCOSE SERPL-MCNC: 120 MG/DL (ref 74–108)
GLUCOSE UR STRIP.AUTO-MCNC: NEGATIVE MG/DL
HCG UR QL: NEGATIVE
HCT VFR BLD AUTO: 41.6 % (ref 35–45)
HGB BLD-MCNC: 13.4 G/DL (ref 12–16)
HGB UR QL STRIP: NEGATIVE
IMM GRANULOCYTES # BLD AUTO: 0.06 K/UL (ref 0–0.04)
IMM GRANULOCYTES NFR BLD AUTO: 0.6 % (ref 0–0.5)
KETONES UR QL STRIP.AUTO: NEGATIVE MG/DL
LEUKOCYTE ESTERASE UR QL STRIP.AUTO: ABNORMAL
LIPASE SERPL-CCNC: 32 U/L (ref 13–75)
LYMPHOCYTES # BLD: 2.34 K/UL (ref 0.9–3.3)
LYMPHOCYTES NFR BLD: 21.5 % (ref 21–52)
MCH RBC QN AUTO: 30.7 PG (ref 24–34)
MCHC RBC AUTO-ENTMCNC: 32.2 G/DL (ref 31–37)
MCV RBC AUTO: 95.2 FL (ref 78–100)
MONOCYTES # BLD: 0.67 K/UL (ref 0.05–1.2)
MONOCYTES NFR BLD: 6.1 % (ref 3–10)
NEUTS SEG # BLD: 7.6 K/UL (ref 1.8–8)
NEUTS SEG NFR BLD: 69.7 % (ref 40–73)
NITRITE UR QL STRIP.AUTO: NEGATIVE
NRBC # BLD: 0 K/UL (ref 0–0.01)
NRBC BLD-RTO: 0 PER 100 WBC
PH UR STRIP: 7 (ref 5–8)
PLATELET # BLD AUTO: 245 K/UL (ref 135–420)
PMV BLD AUTO: 11.3 FL (ref 9.2–11.8)
POTASSIUM SERPL-SCNC: 4.4 MMOL/L (ref 3.5–5.5)
PROT SERPL-MCNC: 6.6 G/DL (ref 6.4–8.2)
PROT UR STRIP-MCNC: NEGATIVE MG/DL
RBC # BLD AUTO: 4.37 M/UL (ref 4.2–5.3)
RBC #/AREA URNS HPF: ABNORMAL /HPF (ref 0–5)
SODIUM SERPL-SCNC: 143 MMOL/L (ref 136–145)
SP GR UR REFRACTOMETRY: 1.02 (ref 1–1.03)
UROBILINOGEN UR QL STRIP.AUTO: 1 EU/DL (ref 0.2–1)
WBC # BLD AUTO: 10.9 K/UL (ref 4.6–13.2)
WBC URNS QL MICRO: ABNORMAL /HPF (ref 0–5)

## 2025-06-14 PROCEDURE — 99285 EMERGENCY DEPT VISIT HI MDM: CPT

## 2025-06-14 PROCEDURE — 85025 COMPLETE CBC W/AUTO DIFF WBC: CPT

## 2025-06-14 PROCEDURE — 6360000004 HC RX CONTRAST MEDICATION: Performed by: EMERGENCY MEDICINE

## 2025-06-14 PROCEDURE — 96374 THER/PROPH/DIAG INJ IV PUSH: CPT

## 2025-06-14 PROCEDURE — 74177 CT ABD & PELVIS W/CONTRAST: CPT

## 2025-06-14 PROCEDURE — 2580000003 HC RX 258: Performed by: EMERGENCY MEDICINE

## 2025-06-14 PROCEDURE — 83690 ASSAY OF LIPASE: CPT

## 2025-06-14 PROCEDURE — 81025 URINE PREGNANCY TEST: CPT

## 2025-06-14 PROCEDURE — 96375 TX/PRO/DX INJ NEW DRUG ADDON: CPT

## 2025-06-14 PROCEDURE — 81001 URINALYSIS AUTO W/SCOPE: CPT

## 2025-06-14 PROCEDURE — 6360000002 HC RX W HCPCS: Performed by: EMERGENCY MEDICINE

## 2025-06-14 PROCEDURE — 80053 COMPREHEN METABOLIC PANEL: CPT

## 2025-06-14 RX ORDER — SIMETHICONE 80 MG
80 TABLET,CHEWABLE ORAL
Status: COMPLETED | OUTPATIENT
Start: 2025-06-15 | End: 2025-06-15

## 2025-06-14 RX ORDER — FAMOTIDINE 20 MG/1
20 TABLET, FILM COATED ORAL 2 TIMES DAILY
Qty: 60 TABLET | Refills: 0 | Status: SHIPPED | OUTPATIENT
Start: 2025-06-14 | End: 2025-07-14

## 2025-06-14 RX ORDER — IOPAMIDOL 612 MG/ML
100 INJECTION, SOLUTION INTRAVASCULAR
Status: COMPLETED | OUTPATIENT
Start: 2025-06-14 | End: 2025-06-14

## 2025-06-14 RX ORDER — ONDANSETRON 2 MG/ML
4 INJECTION INTRAMUSCULAR; INTRAVENOUS
Status: COMPLETED | OUTPATIENT
Start: 2025-06-14 | End: 2025-06-14

## 2025-06-14 RX ORDER — ONDANSETRON 4 MG/1
4 TABLET, ORALLY DISINTEGRATING ORAL EVERY 8 HOURS PRN
Qty: 20 TABLET | Refills: 0 | Status: SHIPPED | OUTPATIENT
Start: 2025-06-14

## 2025-06-14 RX ORDER — 0.9 % SODIUM CHLORIDE 0.9 %
1000 INTRAVENOUS SOLUTION INTRAVENOUS ONCE
Status: COMPLETED | OUTPATIENT
Start: 2025-06-14 | End: 2025-06-15

## 2025-06-14 RX ORDER — DICYCLOMINE HCL 20 MG
20 TABLET ORAL EVERY 6 HOURS
Qty: 24 TABLET | Refills: 0 | Status: SHIPPED | OUTPATIENT
Start: 2025-06-14

## 2025-06-14 RX ORDER — MORPHINE SULFATE 4 MG/ML
4 INJECTION, SOLUTION INTRAMUSCULAR; INTRAVENOUS
Refills: 0 | Status: COMPLETED | OUTPATIENT
Start: 2025-06-14 | End: 2025-06-14

## 2025-06-14 RX ADMIN — IOPAMIDOL 100 ML: 612 INJECTION, SOLUTION INTRAVENOUS at 22:46

## 2025-06-14 RX ADMIN — ONDANSETRON 4 MG: 2 INJECTION, SOLUTION INTRAMUSCULAR; INTRAVENOUS at 22:21

## 2025-06-14 RX ADMIN — SODIUM CHLORIDE 1000 ML: 0.9 INJECTION, SOLUTION INTRAVENOUS at 21:53

## 2025-06-14 RX ADMIN — MORPHINE SULFATE 4 MG: 4 INJECTION, SOLUTION INTRAMUSCULAR; INTRAVENOUS at 22:21

## 2025-06-14 ASSESSMENT — PAIN SCALES - GENERAL
PAINLEVEL_OUTOF10: 10
PAINLEVEL_OUTOF10: 10

## 2025-06-14 ASSESSMENT — PAIN DESCRIPTION - LOCATION: LOCATION: ABDOMEN

## 2025-06-14 ASSESSMENT — PAIN - FUNCTIONAL ASSESSMENT: PAIN_FUNCTIONAL_ASSESSMENT: 0-10

## 2025-06-14 ASSESSMENT — PAIN DESCRIPTION - ORIENTATION: ORIENTATION: RIGHT

## 2025-06-14 ASSESSMENT — PAIN DESCRIPTION - DESCRIPTORS: DESCRIPTORS: ACHING

## 2025-06-15 PROCEDURE — 96361 HYDRATE IV INFUSION ADD-ON: CPT

## 2025-06-15 PROCEDURE — 6370000000 HC RX 637 (ALT 250 FOR IP): Performed by: EMERGENCY MEDICINE

## 2025-06-15 RX ADMIN — SIMETHICONE 80 MG: 80 TABLET, CHEWABLE ORAL at 00:05

## 2025-06-15 NOTE — DISCHARGE INSTRUCTIONS
Thank you for choosing [facility] Department for your care. It is our privilege to provide excellent care for you in your time of need. In the next several days, you may receive a survey via mail or email about your experience with our team. We would appreciate you taking a few minutes to complete the survey, as we use this information to learn what we have done well and what we could be doing better. Thank you for trusting us with your care.    -----------------------------------------------------------------------------  Below you will find a list of your tests from today's visit.   Labs  Recent Results (from the past 12 hours)   CBC with Auto Differential    Collection Time: 06/14/25  9:47 PM   Result Value Ref Range    WBC 10.9 4.6 - 13.2 K/uL    RBC 4.37 4.20 - 5.30 M/uL    Hemoglobin 13.4 12.0 - 16.0 g/dL    Hematocrit 41.6 35.0 - 45.0 %    MCV 95.2 78.0 - 100.0 FL    MCH 30.7 24.0 - 34.0 PG    MCHC 32.2 31.0 - 37.0 g/dL    RDW 13.0 11.6 - 14.5 %    Platelets 245 135 - 420 K/uL    MPV 11.3 9.2 - 11.8 FL    Nucleated RBCs 0.0 0  WBC    nRBC 0.00 0.00 - 0.01 K/uL    Neutrophils % 69.7 40.0 - 73.0 %    Lymphocytes % 21.5 21.0 - 52.0 %    Monocytes % 6.1 3.0 - 10.0 %    Eosinophils % 1.5 0.0 - 5.0 %    Basophils % 0.6 0.0 - 2.0 %    Immature Granulocytes % 0.6 (H) 0.0 - 0.5 %    Neutrophils Absolute 7.60 1.80 - 8.00 K/UL    Lymphocytes Absolute 2.34 0.90 - 3.30 K/UL    Monocytes Absolute 0.67 0.05 - 1.20 K/UL    Eosinophils Absolute 0.16 0.00 - 0.40 K/UL    Basophils Absolute 0.07 0.00 - 0.10 K/UL    Immature Granulocytes Absolute 0.06 (H) 0.00 - 0.04 K/UL    Differential Type AUTOMATED     Comprehensive Metabolic Panel    Collection Time: 06/14/25  9:47 PM   Result Value Ref Range    Sodium 143 136 - 145 mmol/L    Potassium 4.4 3.5 - 5.5 mmol/L    Chloride 105 98 - 107 mmol/L    CO2 26 21 - 32 mmol/L    Anion Gap 12 3 - 18 mmol/L    Glucose 120 (H) 74 - 108 mg/dL    BUN 11 6 -

## 2025-06-15 NOTE — ED TRIAGE NOTES
Patient ambulatory in ED for RUQ abdominal pain. Patient states it has been ongoing for a few days and got worse after she ate dinner tonight. Patient has a hx of a gallbladder removal.

## 2025-06-15 NOTE — ED PROVIDER NOTES
[facility] EMERGENCY DEPT  EMERGENCY DEPARTMENT ENCOUNTER    Patient Name: Debby England  MRN: [id number]  YOB: 1980  Provider: Bryan Gentile MD  PCP: Sasha Sweeney DO   Time/Date of evaluation: 10:08 PM EDT on 6/14/25    History of Presenting Illness     Chief Complaint   Patient presents with    Abdominal Pain       History Provided by: Patient and Patient's Family   History is limited by: Nothing    HISTORY (Narative):   Debby England is a 45 y.o. female with a PMHX of endometriosis, cancer, cholecystectomy, hiatal hernia s/p repair who presents to the emergency department (room 12) by POV C/O epigastric abdominal pain onset several days ago, but worse after eating dinner tonight. Associated sxs include nausea, vomiting. Patient denies fever, chills or any other sxs or complaints.     Nursing Notes were all reviewed and agreed with or any disagreements were addressed in the HPI.    Past History     PAST MEDICAL HISTORY:  Past Medical History:   Diagnosis Date    Arthritis     ankles    Cancer (HCC) 12/2020    Endometriosis Carcinoma    Chronic pain     lower back, toes, fingers    Endometriosis     GERD (gastroesophageal reflux disease)     Nausea & vomiting     Seizures (HCC)     age 3 or 4 no episode since than       PAST SURGICAL HISTORY:  Past Surgical History:   Procedure Laterality Date    APPENDECTOMY      COLONOSCOPY N/A 3/21/2022    COLONOSCOPY performed by NAYELI Lowery MD at [facility] ENDOSCOPY    DILATION AND CURETTAGE OF UTERUS  12/2020    HYSTERECTOMY  01/14/2021    IR PORT PLACEMENT > 5 YEARS  2/8/2021    IR PORT PLACEMENT EQUAL OR GREATER THAN 5 YEARS 2/8/2021 [facility] RAD ANGIO IR    IR PORT PLACEMENT > 5 YEARS  2/8/2021       FAMILY HISTORY:  Family History   Problem Relation Age of Onset    Cancer Mother         ovarian       SOCIAL HISTORY:  Social History     Tobacco Use    Smoking status: Never    Smokeless tobacco: Never   Substance Use Topics    Alcohol use: Not

## (undated) DEVICE — NDL PRT INJ NSAF BLNT 18GX1.5 --

## (undated) DEVICE — SYR 10ML CTRL LR LCK NSAF LF --

## (undated) DEVICE — CORDLESS ULTRASONIC DISSECTOR: Brand: SONICISION

## (undated) DEVICE — PREP SKN CHLRAPRP APL 26ML STR --

## (undated) DEVICE — RELOAD STPL SZ 0 L45MM DIA3.5MM 0DEG STD REG TISS BLU TI

## (undated) DEVICE — DISPOSABLE SUCTION/IRRIGATOR TUBE SET WITH TIP: Brand: AHTO

## (undated) DEVICE — TISSUE RETRIEVAL SYSTEM: Brand: INZII RETRIEVAL SYSTEM

## (undated) DEVICE — SUT MONOCRYL PLUS UD 4-0 --

## (undated) DEVICE — YANKAUER,FLEXIBLE HANDLE,REGLR CAPACITY: Brand: MEDLINE INDUSTRIES, INC.

## (undated) DEVICE — SOLUTION LACTATED RINGERS INJECTION USP

## (undated) DEVICE — Z INACTIVE USE 2527070 DRAPE SURG W40XL44IN UNDERBUTTOCK SMS POLYPR W/ PCH BK DISP

## (undated) DEVICE — SPONGE GZ W4XL4IN COT 12 PLY TYP VII WVN C FLD DSGN

## (undated) DEVICE — CYSTO/BLADDER IRRIGATION SET, REGULATING CLAMP

## (undated) DEVICE — CANNULA CUSH AD W/ 14FT TBG

## (undated) DEVICE — REM POLYHESIVE ADULT PATIENT RETURN ELECTRODE: Brand: VALLEYLAB

## (undated) DEVICE — GARMENT,MEDLINE,DVT,INT,CALF,MED, GEN2: Brand: MEDLINE

## (undated) DEVICE — MAJ-1414 SINGLE USE ADPATER BIOPSY VALV: Brand: SINGLE USE ADAPTOR BIOPSY VALVE

## (undated) DEVICE — SPONGE GZ W4XL4IN RAYON POLY 4 PLY NONWOVEN FASTER WICKING

## (undated) DEVICE — BLADELESS OBTURATOR: Brand: WECK VISTA

## (undated) DEVICE — MOUTHPIECE ENDOSCP 20X27MM --

## (undated) DEVICE — APPLICATOR SURG XL L38CM FOR ARISTA ABSRB HEMSTAT FLEXITIP

## (undated) DEVICE — SUT VCRL + 0 36IN UR6 VIO --

## (undated) DEVICE — ROBOTIC PACK: Brand: MEDLINE INDUSTRIES, INC.

## (undated) DEVICE — TUBING, SUCTION, 1/4" X 12', STRAIGHT: Brand: MEDLINE

## (undated) DEVICE — LAP CHOLE: Brand: MEDLINE INDUSTRIES, INC.

## (undated) DEVICE — STERILE POLYISOPRENE POWDER-FREE SURGICAL GLOVES: Brand: PROTEXIS

## (undated) DEVICE — AGENT HEMSTAT 3GM PURIFIED PLNT STARCH PWD ABSRB ARISTA AH

## (undated) DEVICE — ARM DRAPE

## (undated) DEVICE — GLOVE SURG SZ 8 L12IN FNGR THK79MIL GRN LTX FREE

## (undated) DEVICE — TROCAR: Brand: KII® OPTICAL ACCESS SYSTEM

## (undated) DEVICE — VISUALIZATION SYSTEM: Brand: CLEARIFY

## (undated) DEVICE — CATH IV SAFE STR 22GX1IN BLU -- PROTECTIV PLUS

## (undated) DEVICE — SET ADMIN 16ML TBNG L100IN 2 Y INJ SITE IV PIGGY BK DISP

## (undated) DEVICE — D&C HYSTEROSCOPY: Brand: MEDLINE INDUSTRIES, INC.

## (undated) DEVICE — SYR 10ML LUER LOK 1/5ML GRAD --

## (undated) DEVICE — SEAL UNIV 5-8MM DISP BX/10 -- DA VINCI XI - SNGL USE

## (undated) DEVICE — CATH SUC CTRL PRT TRIFLO 14FR --

## (undated) DEVICE — TRAP SPEC COLL POLYP POLYSTYR --

## (undated) DEVICE — SPONGE HEMOSTAT CELLULS 4X8IN -- SURGICEL

## (undated) DEVICE — SYSTEM ES CUP DIA3.5CM PNEUMO OCCL BLLN DISP FOR CLIN POS

## (undated) DEVICE — DRAPE XR C ARM 41X74IN LF --

## (undated) DEVICE — SINGLE PORT MANIFOLD: Brand: NEPTUNE 2

## (undated) DEVICE — SUTURE PDS II SZ 2-0 L27IN ABSRB VLT L36MM CT-1 1/2 CIR Z339H

## (undated) DEVICE — COVER MPLR TIP CRV SCIS ACC DA VINCI

## (undated) DEVICE — SOL IRRIGATION INJ NACL 0.9% 500ML BTL

## (undated) DEVICE — TRAP MUCUS SPECIMEN 40ML -- MEDICHOICE

## (undated) DEVICE — TROCAR: Brand: KII® SLEEVE

## (undated) DEVICE — SOLUTION IV 500ML 0.9% SOD CHL FLX CONT

## (undated) DEVICE — Device

## (undated) DEVICE — SYR 5ML 1/5 GRAD LL NSAF LF --

## (undated) DEVICE — SYRINGE IRRIG 60ML SFT PLIABLE BLB EZ TO GRP 1 HND USE W/

## (undated) DEVICE — KENDALL RADIOLUCENT FOAM MONITORING ELECTRODE RECTANGULAR SHAPE: Brand: KENDALL

## (undated) DEVICE — LAPAROSCOPIC TROCAR SLEEVE/SINGLE USE: Brand: KII® OPTICAL ACCESS SYSTEM

## (undated) DEVICE — SYRINGE 50ML E/T

## (undated) DEVICE — STERILE POLYISOPRENE POWDER-FREE SURGICAL GLOVES WITH EMOLLIENT COATING: Brand: PROTEXIS

## (undated) DEVICE — APPLIER CLP M L L11.4IN DIA10MM ENDOSCP ROT MULT FOR LIG

## (undated) DEVICE — [HIGH FLOW INSUFFLATOR,  DO NOT USE IF PACKAGE IS DAMAGED,  KEEP DRY,  KEEP AWAY FROM SUNLIGHT,  PROTECT FROM HEAT AND RADIOACTIVE SOURCES.]: Brand: PNEUMOSURE

## (undated) DEVICE — TOWEL,OR,DSP,ST,BLUE,STD,4/PK,20PK/CS: Brand: MEDLINE

## (undated) DEVICE — AIRSEAL 5 MM ACCESS PORT AND LOW PROFILE OBTURATOR WITH BLADELESS OPTICAL TIP, 100 MM LENGTH: Brand: AIRSEAL

## (undated) DEVICE — INSUFFLATION NEEDLE TO ESTABLISH PNEUMOPERITONEUM.: Brand: INSUFFLATION NEEDLE

## (undated) DEVICE — TOTAL TRAY, DB, 100% SILI FOLEY, 16FR 10: Brand: MEDLINE

## (undated) DEVICE — DERMABOND SKIN ADH 0.7ML --

## (undated) DEVICE — BLADE ELECTRODE: Brand: EDGE

## (undated) DEVICE — TRI-LUMEN FILTERED TUBE SET WITH ACTIVATED CHARCOAL FILTER: Brand: AIRSEAL

## (undated) DEVICE — NDL FLTR TIP 5 MIC 18GX1.5IN --

## (undated) DEVICE — GLOVE ORANGE PI 7 1/2   MSG9075

## (undated) DEVICE — COLUMN DRAPE

## (undated) DEVICE — PAD PT POS 36 IN SURGYPAD DISP

## (undated) DEVICE — TRNQT TEXT 1X18IN BLU LF DISP -- CONVERT TO ITEM 362165

## (undated) DEVICE — TRAP,MUCUS SPECIMEN,40CC: Brand: MEDLINE

## (undated) DEVICE — ROCKER SWITCH PENCIL HOLSTER: Brand: VALLEYLAB

## (undated) DEVICE — TIP IU L8CM DIA6.7MM BLU SIL FLX DISP RUMI II

## (undated) DEVICE — AGENT HEMSTAT W6XL9IN OXIDIZED REGENERATED CELOS ABSRB FOR

## (undated) DEVICE — WRISTBAND ID AD W2.5XL9.5CM RED VYN ADH CLSR UNI-PRINT

## (undated) DEVICE — SYR 3ML LL TIP 1/10ML GRAD --